# Patient Record
Sex: FEMALE | Race: BLACK OR AFRICAN AMERICAN | NOT HISPANIC OR LATINO | Employment: OTHER | URBAN - METROPOLITAN AREA
[De-identification: names, ages, dates, MRNs, and addresses within clinical notes are randomized per-mention and may not be internally consistent; named-entity substitution may affect disease eponyms.]

---

## 2017-02-06 ENCOUNTER — COMMUNICATION - HEALTHEAST (OUTPATIENT)
Dept: INTERNAL MEDICINE | Facility: CLINIC | Age: 82
End: 2017-02-06

## 2017-02-06 DIAGNOSIS — G89.4 CHRONIC PAIN SYNDROME: ICD-10-CM

## 2017-02-06 DIAGNOSIS — R51.9 HEADACHE: ICD-10-CM

## 2017-02-06 DIAGNOSIS — R10.9 ABDOMINAL PAIN, UNSPECIFIED SITE: ICD-10-CM

## 2017-02-15 ENCOUNTER — HOSPITAL ENCOUNTER (INPATIENT)
Facility: CLINIC | Age: 82
LOS: 3 days | Discharge: HOME | DRG: 195 | End: 2017-02-18
Attending: FAMILY MEDICINE | Admitting: INTERNAL MEDICINE
Payer: COMMERCIAL

## 2017-02-15 ENCOUNTER — RECORDS - HEALTHEAST (OUTPATIENT)
Dept: ADMINISTRATIVE | Facility: OTHER | Age: 82
End: 2017-02-15

## 2017-02-15 ENCOUNTER — APPOINTMENT (OUTPATIENT)
Dept: GENERAL RADIOLOGY | Facility: CLINIC | Age: 82
DRG: 195 | End: 2017-02-15
Attending: FAMILY MEDICINE
Payer: COMMERCIAL

## 2017-02-15 DIAGNOSIS — R11.2 NON-INTRACTABLE VOMITING WITH NAUSEA, UNSPECIFIED VOMITING TYPE: ICD-10-CM

## 2017-02-15 DIAGNOSIS — K59.01 SLOW TRANSIT CONSTIPATION: Primary | ICD-10-CM

## 2017-02-15 DIAGNOSIS — E87.6 HYPOKALEMIA: ICD-10-CM

## 2017-02-15 DIAGNOSIS — J10.1 INFLUENZA B: ICD-10-CM

## 2017-02-15 LAB
ALBUMIN SERPL-MCNC: 3.4 G/DL (ref 3.4–5)
ALBUMIN UR-MCNC: NEGATIVE MG/DL
ALP SERPL-CCNC: 63 U/L (ref 40–150)
ALT SERPL W P-5'-P-CCNC: 17 U/L (ref 0–50)
ANION GAP SERPL CALCULATED.3IONS-SCNC: 7 MMOL/L (ref 3–14)
APPEARANCE UR: CLEAR
AST SERPL W P-5'-P-CCNC: 25 U/L (ref 0–45)
BASOPHILS # BLD AUTO: 0 10E9/L (ref 0–0.2)
BASOPHILS NFR BLD AUTO: 0.4 %
BILIRUB SERPL-MCNC: 0.4 MG/DL (ref 0.2–1.3)
BILIRUB UR QL STRIP: NEGATIVE
BUN SERPL-MCNC: 9 MG/DL (ref 7–30)
CALCIUM SERPL-MCNC: 8.7 MG/DL (ref 8.5–10.1)
CHLORIDE SERPL-SCNC: 95 MMOL/L (ref 94–109)
CO2 SERPL-SCNC: 32 MMOL/L (ref 20–32)
COLOR UR AUTO: NORMAL
CREAT SERPL-MCNC: 0.58 MG/DL (ref 0.52–1.04)
DIFFERENTIAL METHOD BLD: ABNORMAL
EOSINOPHIL # BLD AUTO: 0 10E9/L (ref 0–0.7)
EOSINOPHIL NFR BLD AUTO: 0 %
ERYTHROCYTE [DISTWIDTH] IN BLOOD BY AUTOMATED COUNT: 12.8 % (ref 10–15)
FLUAV+FLUBV AG SPEC QL: ABNORMAL
FLUAV+FLUBV AG SPEC QL: NEGATIVE
GFR SERPL CREATININE-BSD FRML MDRD: ABNORMAL ML/MIN/1.7M2
GLUCOSE SERPL-MCNC: 111 MG/DL (ref 70–99)
GLUCOSE UR STRIP-MCNC: NEGATIVE MG/DL
HCT VFR BLD AUTO: 39 % (ref 35–47)
HGB BLD-MCNC: 13.3 G/DL (ref 11.7–15.7)
HGB UR QL STRIP: NEGATIVE
IMM GRANULOCYTES # BLD: 0 10E9/L (ref 0–0.4)
IMM GRANULOCYTES NFR BLD: 0 %
KETONES UR STRIP-MCNC: NEGATIVE MG/DL
LACTATE BLD-SCNC: 1.7 MMOL/L (ref 0.7–2.1)
LEUKOCYTE ESTERASE UR QL STRIP: NEGATIVE
LIPASE SERPL-CCNC: 334 U/L (ref 73–393)
LYMPHOCYTES # BLD AUTO: 0.9 10E9/L (ref 0.8–5.3)
LYMPHOCYTES NFR BLD AUTO: 37.6 %
MCH RBC QN AUTO: 30 PG (ref 26.5–33)
MCHC RBC AUTO-ENTMCNC: 34.1 G/DL (ref 31.5–36.5)
MCV RBC AUTO: 88 FL (ref 78–100)
MONOCYTES # BLD AUTO: 0.5 10E9/L (ref 0–1.3)
MONOCYTES NFR BLD AUTO: 20.1 %
NEUTROPHILS # BLD AUTO: 1 10E9/L (ref 1.6–8.3)
NEUTROPHILS NFR BLD AUTO: 41.9 %
NITRATE UR QL: NEGATIVE
NRBC # BLD AUTO: 0 10*3/UL
NRBC BLD AUTO-RTO: 0 /100
PH UR STRIP: 6.5 PH (ref 5–7)
PLATELET # BLD AUTO: 127 10E9/L (ref 150–450)
PLATELET # BLD EST: ABNORMAL 10*3/UL
POTASSIUM SERPL-SCNC: 3 MMOL/L (ref 3.4–5.3)
PROT SERPL-MCNC: 8.1 G/DL (ref 6.8–8.8)
RBC # BLD AUTO: 4.44 10E12/L (ref 3.8–5.2)
RBC MORPH BLD: NORMAL
SODIUM SERPL-SCNC: 134 MMOL/L (ref 133–144)
SP GR UR STRIP: 1 (ref 1–1.03)
SPECIMEN SOURCE: ABNORMAL
URN SPEC COLLECT METH UR: NORMAL
UROBILINOGEN UR STRIP-MCNC: NORMAL MG/DL (ref 0–2)
WBC # BLD AUTO: 2.3 10E9/L (ref 4–11)

## 2017-02-15 PROCEDURE — 99285 EMERGENCY DEPT VISIT HI MDM: CPT | Mod: 25

## 2017-02-15 PROCEDURE — 85025 COMPLETE CBC W/AUTO DIFF WBC: CPT | Performed by: FAMILY MEDICINE

## 2017-02-15 PROCEDURE — 96365 THER/PROPH/DIAG IV INF INIT: CPT

## 2017-02-15 PROCEDURE — 99285 EMERGENCY DEPT VISIT HI MDM: CPT | Mod: Z6 | Performed by: FAMILY MEDICINE

## 2017-02-15 PROCEDURE — 83605 ASSAY OF LACTIC ACID: CPT | Performed by: FAMILY MEDICINE

## 2017-02-15 PROCEDURE — 80053 COMPREHEN METABOLIC PANEL: CPT | Performed by: FAMILY MEDICINE

## 2017-02-15 PROCEDURE — 12000001 ZZH R&B MED SURG/OB UMMC

## 2017-02-15 PROCEDURE — 25800025 ZZH RX 258: Performed by: INTERNAL MEDICINE

## 2017-02-15 PROCEDURE — 25000128 H RX IP 250 OP 636: Performed by: FAMILY MEDICINE

## 2017-02-15 PROCEDURE — 83690 ASSAY OF LIPASE: CPT | Performed by: FAMILY MEDICINE

## 2017-02-15 PROCEDURE — 99222 1ST HOSP IP/OBS MODERATE 55: CPT | Performed by: INTERNAL MEDICINE

## 2017-02-15 PROCEDURE — 25000128 H RX IP 250 OP 636: Performed by: INTERNAL MEDICINE

## 2017-02-15 PROCEDURE — 87804 INFLUENZA ASSAY W/OPTIC: CPT | Performed by: FAMILY MEDICINE

## 2017-02-15 PROCEDURE — 25000132 ZZH RX MED GY IP 250 OP 250 PS 637: Performed by: INTERNAL MEDICINE

## 2017-02-15 PROCEDURE — 71020 XR CHEST 2 VW: CPT

## 2017-02-15 PROCEDURE — 81003 URINALYSIS AUTO W/O SCOPE: CPT | Performed by: FAMILY MEDICINE

## 2017-02-15 PROCEDURE — 25000125 ZZHC RX 250: Performed by: FAMILY MEDICINE

## 2017-02-15 RX ORDER — POTASSIUM CHLORIDE 1500 MG/1
20-40 TABLET, EXTENDED RELEASE ORAL
Status: DISCONTINUED | OUTPATIENT
Start: 2017-02-15 | End: 2017-02-18 | Stop reason: HOSPADM

## 2017-02-15 RX ORDER — POTASSIUM CHLORIDE 1.5 G/1.58G
20-40 POWDER, FOR SOLUTION ORAL
Status: DISCONTINUED | OUTPATIENT
Start: 2017-02-15 | End: 2017-02-18 | Stop reason: HOSPADM

## 2017-02-15 RX ORDER — AMLODIPINE BESYLATE 5 MG/1
5 TABLET ORAL DAILY
Status: DISCONTINUED | OUTPATIENT
Start: 2017-02-15 | End: 2017-02-18 | Stop reason: HOSPADM

## 2017-02-15 RX ORDER — POTASSIUM CHLORIDE 7.45 MG/ML
10 INJECTION INTRAVENOUS
Status: DISCONTINUED | OUTPATIENT
Start: 2017-02-15 | End: 2017-02-18 | Stop reason: HOSPADM

## 2017-02-15 RX ORDER — ALUMINA, MAGNESIA, AND SIMETHICONE 2400; 2400; 240 MG/30ML; MG/30ML; MG/30ML
15 SUSPENSION ORAL EVERY 4 HOURS PRN
Status: DISCONTINUED | OUTPATIENT
Start: 2017-02-15 | End: 2017-02-18 | Stop reason: HOSPADM

## 2017-02-15 RX ORDER — ACETAMINOPHEN 325 MG/1
650 TABLET ORAL EVERY 4 HOURS PRN
Status: DISCONTINUED | OUTPATIENT
Start: 2017-02-15 | End: 2017-02-18 | Stop reason: HOSPADM

## 2017-02-15 RX ORDER — POTASSIUM CHLORIDE 29.8 MG/ML
20 INJECTION INTRAVENOUS
Status: DISCONTINUED | OUTPATIENT
Start: 2017-02-15 | End: 2017-02-18 | Stop reason: HOSPADM

## 2017-02-15 RX ORDER — SODIUM CHLORIDE 9 MG/ML
INJECTION, SOLUTION INTRAVENOUS CONTINUOUS
Status: DISCONTINUED | OUTPATIENT
Start: 2017-02-15 | End: 2017-02-17

## 2017-02-15 RX ORDER — LANOLIN ALCOHOL/MO/W.PET/CERES
3 CREAM (GRAM) TOPICAL
Status: DISCONTINUED | OUTPATIENT
Start: 2017-02-15 | End: 2017-02-18 | Stop reason: HOSPADM

## 2017-02-15 RX ORDER — SENNOSIDES 8.6 MG
8.6 TABLET ORAL 2 TIMES DAILY PRN
Status: DISCONTINUED | OUTPATIENT
Start: 2017-02-15 | End: 2017-02-17

## 2017-02-15 RX ORDER — SODIUM CHLORIDE 9 MG/ML
INJECTION, SOLUTION INTRAVENOUS CONTINUOUS
Status: DISCONTINUED | OUTPATIENT
Start: 2017-02-15 | End: 2017-02-15

## 2017-02-15 RX ORDER — DEXTROSE MONOHYDRATE, SODIUM CHLORIDE, AND POTASSIUM CHLORIDE 50; .745; 4.5 G/1000ML; G/1000ML; G/1000ML
INJECTION, SOLUTION INTRAVENOUS CONTINUOUS
Status: DISCONTINUED | OUTPATIENT
Start: 2017-02-15 | End: 2017-02-15

## 2017-02-15 RX ORDER — ALBUTEROL SULFATE 0.83 MG/ML
2.5 SOLUTION RESPIRATORY (INHALATION)
Status: DISCONTINUED | OUTPATIENT
Start: 2017-02-15 | End: 2017-02-18 | Stop reason: HOSPADM

## 2017-02-15 RX ADMIN — MELATONIN TAB 3 MG 3 MG: 3 TAB at 21:52

## 2017-02-15 RX ADMIN — POTASSIUM CHLORIDE 10 MEQ: 14.9 INJECTION, SOLUTION, CONCENTRATE PARENTERAL at 11:30

## 2017-02-15 RX ADMIN — POTASSIUM CHLORIDE, DEXTROSE MONOHYDRATE AND SODIUM CHLORIDE: 75; 5; 450 INJECTION, SOLUTION INTRAVENOUS at 14:57

## 2017-02-15 RX ADMIN — SODIUM CHLORIDE: 9 INJECTION, SOLUTION INTRAVENOUS at 16:23

## 2017-02-15 RX ADMIN — SODIUM CHLORIDE 1000 ML: 9 INJECTION, SOLUTION INTRAVENOUS at 08:44

## 2017-02-15 RX ADMIN — AMLODIPINE BESYLATE 5 MG: 5 TABLET ORAL at 16:31

## 2017-02-15 RX ADMIN — PSYLLIUM HUSK 0.52 G: 20 CAPSULE ORAL at 16:31

## 2017-02-15 RX ADMIN — ENOXAPARIN SODIUM 30 MG: 30 INJECTION SUBCUTANEOUS at 16:32

## 2017-02-15 RX ADMIN — ACETAMINOPHEN 650 MG: 325 TABLET, FILM COATED ORAL at 16:44

## 2017-02-15 NOTE — ED PROVIDER NOTES
History     Chief Complaint   Patient presents with     Cough     cough for the past 3 days, yellow sputum     Vomiting     vomited 3x , started vomiting since being started on Tamaflu on Saturday     Abdominal Pain     lower left abd pain for 3 days     HPI  Radha Mcmanus is a 86 year old female who presents with cough, productive of yellow sputum,fever, chills and myalgias, nausea and vomiting.  Symptoms started with cough and fever 4 days ago. She was seen in urgent care was diagnosed with influenza and started on Tamiflu. Family and patient did not believe any testing was done for flu. A chest x-ray showed only chronic changes that day.  Her symptoms persist, and now she also reports lower abdominal pain, constipation, red blood in stool when she wipes pain with cough.  Patient reports she vomited 3 or 4 times last night. She is not nauseated now.  No urinary symptoms, no radiation of abdominal pain.    I have reviewed the Medications, Allergies, Past Medical and Surgical History, and Social History in the Epic system.    Review of Systems  All other systems reviewed and were negative    Physical Exam   BP: (!) 161/94  Pulse: 88  Temp: 98.1  F (36.7  C)  Resp: 18  SpO2: 98 %  Physical Exam   Constitutional: She is oriented to person, place, and time. She appears well-developed and well-nourished.   HENT:   Head: Normocephalic and atraumatic.   Mouth/Throat: Uvula is midline. Posterior oropharyngeal erythema present. No oropharyngeal exudate or posterior oropharyngeal edema.   Eyes: EOM are normal. Pupils are equal, round, and reactive to light.   Neck: Normal range of motion and phonation normal. Neck supple. No tracheal deviation present. No thyromegaly present.   Cardiovascular: Normal rate, regular rhythm, normal heart sounds and intact distal pulses.  Exam reveals no gallop and no friction rub.    No murmur heard.  Pulmonary/Chest: Effort normal. No stridor. No respiratory distress. She has rales  (coarse, bibasilar). She exhibits no tenderness.   Abdominal: Soft. Bowel sounds are normal. She exhibits no distension and no mass. There is tenderness in the left lower quadrant. There is no rigidity, no rebound, no guarding and no CVA tenderness.   Musculoskeletal: She exhibits no edema or tenderness.   Neurological: She is alert and oriented to person, place, and time. No cranial nerve deficit. Coordination normal.   Skin: Skin is warm and dry. No rash noted.   Psychiatric: She has a normal mood and affect. Her behavior is normal.   Nursing note and vitals reviewed.      ED Course     ED Course     Procedures             Critical Care time:  none               Labs Ordered and Resulted from Time of ED Arrival Up to the Time of Departure from the ED   CBC WITH PLATELETS DIFFERENTIAL - Abnormal; Notable for the following:        Result Value    WBC 2.3 (*)     Platelet Count 127 (*)     Absolute Neutrophil 1.0 (*)     All other components within normal limits   COMPREHENSIVE METABOLIC PANEL - Abnormal; Notable for the following:     Potassium 3.0 (*)     Glucose 111 (*)     All other components within normal limits   INFLUENZA A/B ANTIGEN - Abnormal; Notable for the following:     Influenza B   (*)     Value: Positive   Test results must be correlated with clinical data. If necessary, results   should be confirmed by a molecular assay or viral culture.      All other components within normal limits   LIPASE   LACTIC ACID WHOLE BLOOD   UA MACROSCOPIC WITH REFLEX TO MICRO AND CULTURE   PULSE OXIMETRY NURSING       Assessments & Plan (with Medical Decision Making)   Elderly patient presenting with 3-4 days of febrile respiratory illness, started empirically on Tamiflu, developed nausea vomiting and generalized weakness.  Initial differential diagnosis includes influenza, pneumonia, other viral upper respiratory infection with gastroenteritis, small bowel obstruction, diverticulitis, adverse reaction to Tamiflu.  On  exam she has stable vitals and is in no respiratory distress.  She does appear slightly dehydrated and exhibits signs of generalized weakness.  She has coarse rales at her lung bases, however her chest x-ray does not show any acute infiltrates.  White blood cell count is low with absolute neutrophil count of 1000, certainly consistent with viral syndrome.  Influenza B swab positive.  Abdominal exam is benign, making diverticulitis or other acute abdominal process much less likely.  We have started replacement with IV fluids, IV potassium.  Given the patient's advanced age, severity of her symptoms, and difficulty with tolerating Tamiflu, I would like to admit for hydration, potassium replacement,, when necessary antiemetics and Tamiflu.  I will discuss with the hospitalist.    I have reviewed the nursing notes.    I have reviewed the findings, diagnosis, plan and need for follow up with the patient.    New Prescriptions    No medications on file       Final diagnoses:   Influenza B   Non-intractable vomiting with nausea, unspecified vomiting type   Hypokalemia       2/15/2017   [facility], [place], EMERGENCY DEPARTMENT     Catalino Morocho MD  02/15/17 2668

## 2017-02-15 NOTE — PLAN OF CARE
Problem: Goal Outcome Summary  Goal: Goal Outcome Summary  Outcome: No Change  Pt arrived on unit at 1345. Sammarinese speaking,  ordered 1600 for full assessment LS diminished. Voiding. IV fluids running 125 mL/hr. Tolerating reg diet. Continue POC.

## 2017-02-15 NOTE — H&P
DATE OF ADMISSION AND EXAMINATION:  02/15/2017      HISTORY OF PRESENT ILLNESS:  Radha Mcmanus is an 86-year-old generally healthy Bangladeshi female who is being admitted through the ER for the evaluation of cough, nausea, vomiting and weakness in the setting of acute influenza B infection.  The patient presented to the ER with a 3-4 day history of fevers, minimally productive cough, myalgias.  She was seen in urgent care center approximately 1 day after the onset of her symptoms, 4 days ago was started empirically on Tamiflu.  The patient's family states that subsequently she developed nausea with frequent episodes of emesis as well as left lower abdominal pain.  She has had very little intake over the last 1-2 days and reports having several episodes of emesis the night prior to admission.  She is not aware of any fever.  She denies any  symptoms.  She has been constipated and notes that she had some red blood and a small hard stool last evening.  She denies chest pain.  She denies a history of chronic lung disease.  Family believes she had an episode of pneumonia many years ago.  No history of asthma, no history of cigarette use.  Chest x-ray performed in the ER revealed no acute process.      Laboratory studies in the ER were remarkable for a sodium of 134, potassium 3.0, BUN and creatinine were normal.  Lactic acid level is 1.7.  CBC was remarkable for white count 2300, platelet count is 127,000, hemoglobin was 13.3.      PAST MEDICAL HISTORY:  Generally unremarkable for chronic medical concerns.  She has a history of hypertension treated with amlodipine.  Has a history of L3 vertebral fracture noted in 2015.  The family is not aware of a history of cardiac or pulmonary disease.      MEDICATIONS PRIOR TO ADMISSION:     1.  Amlodipine 5 mg daily.   2.  Omeprazole 40 mg daily.   3.  Tylenol on a p.r.n. basis.      MEDICATION ALLERGIES:  Contrast dye.  It is not clear what reaction she gets from this.  There is also an  allergy to erythromycin and it is not clear what reaction she gets from this either.      SOCIAL HISTORY:  The patient lives with a relative.  She is quite active at baseline and is independent with her cares.  There is no history of cigarette or alcohol abuse.      FAMILY HISTORY:  Reviewed and noncontributory.      PHYSICAL EXAMINATION:   GENERAL:  She is a well-appearing female who is lying on a stretcher in the emergency room.  She does not appear acutely ill.  She does have an occasional dry cough.   VITAL SIGNS:  Afebrile, blood pressure is 143/77.  Heart rate is in the 70s.  O2 saturation in the upper 90s on room air.     HEENT:  Teeth are in fair repair.  Oral mucosa is moist.   NECK:  Supple.   LUNGS:  Clear.   CARDIAC:  Reveals faint crackles at both bases.     CARDIAC:  Reveals a regular rate and rhythm without murmurs.   BREASTS:  Not examined.   ABDOMEN:  Soft, nondistended.  There is mild left lower abdominal tenderness which appears to be accentuated when she coughs or strains.  There are no obvious masses.   RECTAL:  Not performed.   EXTREMITIES:  Reveals no clubbing, cyanosis or edema.      LABORATORY STUDIES:  As outlined above.      ASSESSMENT:   1.  Acute febrile illness manifested by cough, sore throat, malaise with subsequent nausea and vomiting which may be related to the institution of Tamiflu a few days ago.  Laboratory studies are remarkable for mild hyponatremia, hypokalemia and also for mild leukopenia, the etiology or chronicity of which is not clear.  Conceivably, leukopenia is related to a viral illness as well.  The patient states that she does feel improved already since receiving IV fluids in the ER.  She will need to be admitted though for further monitoring of her gastrointestinal and respiratory status.   2.  Hypokalemia secondary to frequent nausea, vomiting.   3.  Leukopenia as above possibly related to viral illness versus chronic process.  There is no history to suggest  underlying malignancy.   4.  Hypertension, under fair control on amlodipine.      PLAN:  The patient is being admitted to the medical unit on droplet precautions.  She will be continued on IV fluids.  She will be given potassium replacement.  Albuterol nebulizer treatments will be offered to her for any paroxysms of coughing.  Otherwise, she will receive supportive treatment.  Electrolytes will be repeated in the morning.  Her GI status will be monitored.  I suspect her GI symptoms will improve with hydration and with stopping Tamiflu.  Should she experience continued lower abdominal pain, then further evaluation would be indicated.  The patient does describe constipation which could conceivably be the cause for her abdominal pain as well as her report of bright red blood and hard stool last evening.  I would add the patient's son does not believe she has ever had a colonoscopy before.  Again, this can be reassessed in the morning.        The patient will be encouraged to ambulate with staff present.  She will be started on Lovenox at least for today until it is clear that she is more active.  I would anticipate she will be able to be discharged within 1-3 days as respiratory and GI status dictate.         ALEJANDRINA AGUERO MD             D: 02/15/2017 15:35   T: 02/15/2017 17:23   MT:       Name:     LASHAY FABIAN   MRN:      [id number]        Account:      [id number]   :      1931           Admitted:     [id number]      Document: [id number]

## 2017-02-15 NOTE — LETTER
Transition Communication Hand-off for Care Transitions to Next Level of Care Provider    Name: Radha Mcmanus  MRN #: [id number]  Primary Care Provider: Lawrence Mckeon     Primary Clinic: [address] [address]     Reason for Hospitalization:  Hypokalemia [E87.6]  Influenza B [J10.1]  Non-intractable vomiting with nausea, unspecified vomiting type [R11.2]  Admit Date/Time: 2/15/2017  8:02 AM  Expected Discharge Date: 2/18/17  Payor Source: Payor: UCARE / Plan: UCARE FOR [employer] / Product Type: HMO /            Discharge Plan: home with family support        AVS/Discharge Summary included

## 2017-02-15 NOTE — IP AVS SNAPSHOT
[facility]    [address]    [place] [address]    Phone:  [phone]                                       After Visit Summary   2/15/2017    Radha Mcmanus    MRN: [id number]           After Visit Summary Signature Page     I have received my discharge instructions, and my questions have been answered. I have discussed any challenges I see with this plan with the nurse or doctor.    ..........................................................................................................................................  Patient/Patient Representative Signature      ..........................................................................................................................................  Patient Representative Print Name and Relationship to Patient    ..................................................               ................................................  Date                                            Time    ..........................................................................................................................................  Reviewed by Signature/Title    ...................................................              ..............................................  Date                                                            Time

## 2017-02-15 NOTE — IP AVS SNAPSHOT
MRN:[id number]                      After Visit Summary   2/15/2017    Radha Mcmanus    MRN: [id number]           Thank you!     Thank you for choosing [facility] for your care. Our goal is always to provide you with excellent care. Hearing back from our patients is one way we can continue to improve our services. Please take a few minutes to complete the written survey that you may receive in the mail after you visit with us. Thank you!        Patient Information     Date Of Birth          1/1/1931        About your hospital stay     You were admitted on:  February 15, 2017 You last received care in the:  [facility]    You were discharged on:  February 18, 2017       Who to Call     For medical emergencies, please call 911.  For non-urgent questions about your medical care, please call your primary care provider or clinic, [phone]          Attending Provider     Provider Specialty    Catalino Morocho MD [facility], Abdulaziz Urban MD Internal Medicine       Primary Care Provider Office Phone # Fax #    Lawrence Mckeon [phone]       [address] [address]        Your next 10 appointments already scheduled     Feb 18, 2017  5:00 PM [facility] Inpatient with Matias Samano    Services Department ([facility])    [address] [place] [id number]               Feb 19, 2017  8:30 AM [facility] Inpatient with Lg Ferris    Services Department ([facility])    [address] [place] [id number]               Feb 19, 2017  5:00 PM [facility] Inpatient with Wilmer Faye    Services Department ([facility])    [address] [place] [phone]               Feb 20, 2017  8:30 AM [facility] Inpatient  "with Zaira Rubio    Services Department ([facility])    [address] [facility] [address]               Feb 21, 2017  8:30 AM [facility] Inpatient with Samiradmitri Sanders    Services Department ([facility])    [address] [facility] [address] [address]                 Further instructions from your care team       See primary doctor in 1-2 weeks to have repeat blood work performed (potassium level) and to have abdominal pain and rectal bleeding evaluated.   Call primary doctor or come to emergency room if you have worsening breathing, coughing, fever, worsening abdominal pain.    Pending Results     No orders found from 2/13/2017 to 2/16/2017.            Statement of Approval     Ordered          02/18/17 0840  I have reviewed and agree with all the recommendations and orders detailed in this document.  EFFECTIVE NOW     Approved and electronically signed by:  Abdulaziz Dillard MD           02/18/17 0854  I have reviewed and agree with all the recommendations and orders detailed in this document.     Approved and electronically signed by:  Abdulaziz Dillard MD             Admission Information     Date & Time Provider Department Dept. Phone    2/15/2017 Abdulaziz Dillard MD [facility] [phone]      Your Vitals Were     Blood Pressure Pulse Temperature Respirations Pulse Oximetry       130/70 (BP Location: Right arm) 76 98.6  F (37  C) (Oral) 18 98%       MyChart Information     [employer] lets you send messages to your doctor, view your test results, renew your prescriptions, schedule appointments and more. To sign up, go to [email or web address] . Click on \"Log in\" on the left side of the screen, which will take you to the Welcome page. Then click on \"Sign up Now\" on the right side of the page.     You will be asked to enter the access code listed below, as well as " some personal information. Please follow the directions to create your username and password.     Your access code is: [email or web address]  Expires: 2017  4:09 PM     Your access code will  in 90 days. If you need help or a new code, please call your [facility] clinic or [phone].        Care EveryWhere ID     This is your Care EveryWhere ID. This could be used by other organizations to access your [facility] medical records  [id number]           Review of your medicines      START taking        Dose / Directions    polyethylene glycol Packet   Commonly known as:  MIRALAX/GLYCOLAX   Used for:  Slow transit constipation        Dose:  17 g   Take 17 g by mouth daily   Quantity:  30 packet   Refills:  3       potassium chloride SA 20 MEQ CR tablet   Commonly known as:  K-DUR/KLOR-CON M   Used for:  Hypokalemia        Dose:  20 mEq   Take 1 tablet (20 mEq) by mouth daily for 5 days   Quantity:  5 tablet   Refills:  0       psyllium 0.52 G capsule   Used for:  Slow transit constipation        Dose:  0.52 g   Take 1 capsule (0.52 g) by mouth daily   Quantity:  540 capsule   Refills:  3         CONTINUE these medicines which have NOT CHANGED        Dose / Directions    acetaminophen 500 MG tablet   Commonly known as:  TYLENOL        Dose:  1-2 tablet   Take 1-2 tablets by mouth every 6 hours as needed for pain and fever.   Quantity:  30 tablet   Refills:  0       NORVASC 5 MG tablet   Generic drug:  amLODIPine        Dose:  5 mg   Take 5 mg by mouth daily.   Refills:  0       OMEPRAZOLE PO        Dose:  40 mg   Take 40 mg by mouth every morning   Refills:  0       order for DME   Used for:  L3 vertebral fracture, with routine healing, subsequent encounter        Equipment being ordered: Long handled shoe horn and reacher grabber   Quantity:  1 each   Refills:  0            Where to get your medicines      These medications were sent to [facility] [place], [place] - [address]  [address] [place]  [facility] [address]     Phone:  [phone]     polyethylene glycol Packet    potassium chloride SA 20 MEQ CR tablet    psyllium 0.52 G capsule                Protect others around you: Learn how to safely use, store and throw away your medicines at www.disposemymeds.org.             Medication List: This is a list of all your medications and when to take them. Check marks below indicate your daily home schedule. Keep this list as a reference.      Medications           Morning Afternoon Evening Bedtime As Needed    acetaminophen 500 MG tablet   Commonly known as:  TYLENOL   Take 1-2 tablets by mouth every 6 hours as needed for pain and fever.   Last time this was given:  650 mg on 2/17/2017  2:47 AM                                   NORVASC 5 MG tablet   Take 5 mg by mouth daily.   Last time this was given:  5 mg on 2/18/2017  8:35 AM   Generic drug:  amLODIPine                                   OMEPRAZOLE PO   Take 40 mg by mouth every morning   Last time this was given:  40 mg on 2/18/2017  8:35 AM                                   order for DME   Equipment being ordered: Long handled shoe horn and reacher grabber                                polyethylene glycol Packet   Commonly known as:  MIRALAX/GLYCOLAX   Take 17 g by mouth daily   Last time this was given:  17 g on 2/18/2017  8:35 AM                                   potassium chloride SA 20 MEQ CR tablet   Commonly known as:  K-DUR/KLOR-CON M   Take 1 tablet (20 mEq) by mouth daily for 5 days   Last time this was given:  20 mEq on 2/18/2017  9:58 AM                                   psyllium 0.52 G capsule   Take 1 capsule (0.52 g) by mouth daily   Last time this was given:  0.52 g on 2/18/2017  8:35 AM

## 2017-02-16 ENCOUNTER — OFFICE VISIT (OUTPATIENT)
Dept: INTERPRETER SERVICES | Facility: CLINIC | Age: 82
End: 2017-02-16

## 2017-02-16 LAB
ALBUMIN SERPL-MCNC: 3.1 G/DL (ref 3.4–5)
ALP SERPL-CCNC: 51 U/L (ref 40–150)
ALT SERPL W P-5'-P-CCNC: 17 U/L (ref 0–50)
ANION GAP SERPL CALCULATED.3IONS-SCNC: 6 MMOL/L (ref 3–14)
AST SERPL W P-5'-P-CCNC: 24 U/L (ref 0–45)
BILIRUB SERPL-MCNC: 0.4 MG/DL (ref 0.2–1.3)
BUN SERPL-MCNC: 6 MG/DL (ref 7–30)
CALCIUM SERPL-MCNC: 8.1 MG/DL (ref 8.5–10.1)
CHLORIDE SERPL-SCNC: 103 MMOL/L (ref 94–109)
CO2 SERPL-SCNC: 31 MMOL/L (ref 20–32)
CREAT SERPL-MCNC: 0.59 MG/DL (ref 0.52–1.04)
ERYTHROCYTE [DISTWIDTH] IN BLOOD BY AUTOMATED COUNT: 12.9 % (ref 10–15)
GFR SERPL CREATININE-BSD FRML MDRD: ABNORMAL ML/MIN/1.7M2
GLUCOSE SERPL-MCNC: 90 MG/DL (ref 70–99)
HCT VFR BLD AUTO: 36.6 % (ref 35–47)
HGB BLD-MCNC: 12.4 G/DL (ref 11.7–15.7)
MCH RBC QN AUTO: 30.1 PG (ref 26.5–33)
MCHC RBC AUTO-ENTMCNC: 33.9 G/DL (ref 31.5–36.5)
MCV RBC AUTO: 89 FL (ref 78–100)
PLATELET # BLD AUTO: 125 10E9/L (ref 150–450)
POTASSIUM SERPL-SCNC: 3.1 MMOL/L (ref 3.4–5.3)
POTASSIUM SERPL-SCNC: 3.8 MMOL/L (ref 3.4–5.3)
PROT SERPL-MCNC: 7.1 G/DL (ref 6.8–8.8)
RBC # BLD AUTO: 4.12 10E12/L (ref 3.8–5.2)
SODIUM SERPL-SCNC: 140 MMOL/L (ref 133–144)
WBC # BLD AUTO: 2.4 10E9/L (ref 4–11)

## 2017-02-16 PROCEDURE — 99232 SBSQ HOSP IP/OBS MODERATE 35: CPT | Performed by: INTERNAL MEDICINE

## 2017-02-16 PROCEDURE — 36415 COLL VENOUS BLD VENIPUNCTURE: CPT | Performed by: INTERNAL MEDICINE

## 2017-02-16 PROCEDURE — 25000132 ZZH RX MED GY IP 250 OP 250 PS 637: Performed by: INTERNAL MEDICINE

## 2017-02-16 PROCEDURE — 25000128 H RX IP 250 OP 636: Performed by: INTERNAL MEDICINE

## 2017-02-16 PROCEDURE — 84132 ASSAY OF SERUM POTASSIUM: CPT | Performed by: INTERNAL MEDICINE

## 2017-02-16 PROCEDURE — 85027 COMPLETE CBC AUTOMATED: CPT | Performed by: INTERNAL MEDICINE

## 2017-02-16 PROCEDURE — 12000001 ZZH R&B MED SURG/OB UMMC

## 2017-02-16 PROCEDURE — T1013 SIGN LANG/ORAL INTERPRETER: HCPCS | Mod: U3

## 2017-02-16 PROCEDURE — 80053 COMPREHEN METABOLIC PANEL: CPT | Performed by: INTERNAL MEDICINE

## 2017-02-16 RX ORDER — POLYETHYLENE GLYCOL 3350 17 G/17G
17 POWDER, FOR SOLUTION ORAL DAILY
Status: DISCONTINUED | OUTPATIENT
Start: 2017-02-16 | End: 2017-02-18 | Stop reason: HOSPADM

## 2017-02-16 RX ADMIN — AMLODIPINE BESYLATE 5 MG: 5 TABLET ORAL at 09:36

## 2017-02-16 RX ADMIN — POTASSIUM CHLORIDE 40 MEQ: 1500 TABLET, EXTENDED RELEASE ORAL at 09:37

## 2017-02-16 RX ADMIN — PSYLLIUM HUSK 0.52 G: 20 CAPSULE ORAL at 09:36

## 2017-02-16 RX ADMIN — OMEPRAZOLE 40 MG: 20 CAPSULE, DELAYED RELEASE ORAL at 09:36

## 2017-02-16 RX ADMIN — POTASSIUM CHLORIDE 20 MEQ: 1500 TABLET, EXTENDED RELEASE ORAL at 16:22

## 2017-02-16 RX ADMIN — POLYETHYLENE GLYCOL 3350 17 G: 17 POWDER, FOR SOLUTION ORAL at 11:36

## 2017-02-16 RX ADMIN — ACETAMINOPHEN 650 MG: 325 TABLET, FILM COATED ORAL at 02:32

## 2017-02-16 RX ADMIN — ACETAMINOPHEN 650 MG: 325 TABLET, FILM COATED ORAL at 09:36

## 2017-02-16 RX ADMIN — POTASSIUM CHLORIDE 20 MEQ: 1500 TABLET, EXTENDED RELEASE ORAL at 11:36

## 2017-02-16 RX ADMIN — SODIUM CHLORIDE: 9 INJECTION, SOLUTION INTRAVENOUS at 05:39

## 2017-02-16 NOTE — PROGRESS NOTES
Pt seen with     She feels a little better today  Still feels nauseated, has had virtually no po intake since admission  Continues to have a sl productive cough, notes mild chest congestion and SOB  Overall feels very fatigued  L lower abd pain is improved  No BM since admission    Afebrile  BP 100s-140s/  HR 80s   02 sats upper 90s RA    Alert, in  NAD  Oral mucosa moist  RR 14, unlabored  Lungs crackles R base, faint scattered rhonchi B  CV rrr  Abd  Soft, non-distended, mild L LQ tenderness. No rebound or guarding  No LE edema  Alert, fully oriented    K 3.1  WBC 2,400  Hgb 12.4      Assessment    Influenza B infection.  + malaise, chest congestion, cough, nausea with frequent emesis prior to admission. Pt feels slightly improved this am, still with chest congestion, mild SOB    Nausea with emesis prior to admission, secondary to influenza vs side effects of Tamiflu.  No emesis since admission but very poor oral intake.  Pt continues to require IV fluids    Leukopenia (), stable overnight, possibly secondary to viral illness    Hypokalemia secondary to frequent emesis    L LQ abd pain, likely muscular, ie secondary to coughing, appears improved since admission    Constipation in setting of acute illness, with Pt report of blood per rectum with hard stool.  Has never had a colonoscopy    HTN, stable on amlodipine    Plan  Continue IV fluids  K replacement  Alb nebs  Mobilize  Bowel regimen.  Monitor stools, at the least should have an outpt GI eval at some point  No Lovenox in view of reported rectal bleeding

## 2017-02-16 NOTE — PLAN OF CARE
Problem: Goal Outcome Summary  Goal: Goal Outcome Summary  Outcome: No Change  Alert and oriented. VSS on room air. C/o headache in morning, given prn tylenol. Denies N/V, poor appetite. Voiding without difficulty. Bowel sounds active, pt states she has chronic constipation, scheduled bowel medications given. Up in room with SBA. IV infusing. Potassium replaced per protocol, lab recheck scheduled for 1530 today. Able to make needs known.  services used. Continue with POC.

## 2017-02-16 NOTE — PLAN OF CARE
Problem: Goal Outcome Summary  Goal: Goal Outcome Summary  Outcome: Improving  Focus: Pain.  D: Patient A&O times 4; VSS; LS clear and diminished in bases; BS+; c/o generalized pain, especially to abdomen and chest; c/o generalized weakness.  I: Assessment done with Vietnamese ; Tylenol given for pain; Lovenox SQ given.  A: Up with assist and ambulated to BR and voiding good amounts; poor appetite but ate and tolerated food brought in by family; PIV patent, intact and IV fluids infusing; had a small loose BM; family at bedside.  P: Continue with patient care.    Patient requested sleep aid and verbalized she has not slept for 4 nights; Dr. Maddox notified and Melatonin ordered and administered to patient.

## 2017-02-16 NOTE — PLAN OF CARE
Problem: Goal Outcome Summary  Goal: Goal Outcome Summary  Outcome: Improving  A/O x 4. Son is sleeping at bedside. Reports a headache and tylenol given. VSS. IVF infusing. Reports difficulty sleeping even after melatonin. Call light in reach. Cont to assess.

## 2017-02-16 NOTE — PROVIDER NOTIFICATION
Celeste paged. Dr. Maddox on unit and notified of patient's request for sleep aid and verbalized she has not slept for 4 nights.

## 2017-02-17 ENCOUNTER — OFFICE VISIT (OUTPATIENT)
Dept: INTERPRETER SERVICES | Facility: CLINIC | Age: 82
End: 2017-02-17

## 2017-02-17 LAB
ERYTHROCYTE [DISTWIDTH] IN BLOOD BY AUTOMATED COUNT: 12.8 % (ref 10–15)
HCT VFR BLD AUTO: 37.3 % (ref 35–47)
HGB BLD-MCNC: 12.5 G/DL (ref 11.7–15.7)
MCH RBC QN AUTO: 29.8 PG (ref 26.5–33)
MCHC RBC AUTO-ENTMCNC: 33.5 G/DL (ref 31.5–36.5)
MCV RBC AUTO: 89 FL (ref 78–100)
PLATELET # BLD AUTO: 123 10E9/L (ref 150–450)
POTASSIUM SERPL-SCNC: 3.4 MMOL/L (ref 3.4–5.3)
RBC # BLD AUTO: 4.2 10E12/L (ref 3.8–5.2)
WBC # BLD AUTO: 3.2 10E9/L (ref 4–11)

## 2017-02-17 PROCEDURE — 99232 SBSQ HOSP IP/OBS MODERATE 35: CPT | Performed by: INTERNAL MEDICINE

## 2017-02-17 PROCEDURE — T1013 SIGN LANG/ORAL INTERPRETER: HCPCS | Mod: U3

## 2017-02-17 PROCEDURE — 25000125 ZZHC RX 250: Performed by: INTERNAL MEDICINE

## 2017-02-17 PROCEDURE — 84132 ASSAY OF SERUM POTASSIUM: CPT | Performed by: INTERNAL MEDICINE

## 2017-02-17 PROCEDURE — 85027 COMPLETE CBC AUTOMATED: CPT | Performed by: INTERNAL MEDICINE

## 2017-02-17 PROCEDURE — 25000132 ZZH RX MED GY IP 250 OP 250 PS 637: Performed by: INTERNAL MEDICINE

## 2017-02-17 PROCEDURE — 36415 COLL VENOUS BLD VENIPUNCTURE: CPT | Performed by: INTERNAL MEDICINE

## 2017-02-17 PROCEDURE — 12000001 ZZH R&B MED SURG/OB UMMC

## 2017-02-17 RX ORDER — BISACODYL 10 MG
10 SUPPOSITORY, RECTAL RECTAL DAILY PRN
Status: DISCONTINUED | OUTPATIENT
Start: 2017-02-17 | End: 2017-02-18 | Stop reason: HOSPADM

## 2017-02-17 RX ORDER — SENNOSIDES 8.6 MG
2 TABLET ORAL 2 TIMES DAILY PRN
Status: DISCONTINUED | OUTPATIENT
Start: 2017-02-17 | End: 2017-02-18 | Stop reason: HOSPADM

## 2017-02-17 RX ADMIN — OMEPRAZOLE 40 MG: 20 CAPSULE, DELAYED RELEASE ORAL at 07:59

## 2017-02-17 RX ADMIN — POTASSIUM CHLORIDE 10 MEQ: 14.9 INJECTION, SOLUTION, CONCENTRATE PARENTERAL at 08:00

## 2017-02-17 RX ADMIN — POTASSIUM CHLORIDE 10 MEQ: 14.9 INJECTION, SOLUTION, CONCENTRATE PARENTERAL at 09:39

## 2017-02-17 RX ADMIN — AMLODIPINE BESYLATE 5 MG: 5 TABLET ORAL at 07:59

## 2017-02-17 RX ADMIN — PSYLLIUM HUSK 0.52 G: 20 CAPSULE ORAL at 07:59

## 2017-02-17 RX ADMIN — ACETAMINOPHEN 650 MG: 325 TABLET, FILM COATED ORAL at 02:47

## 2017-02-17 RX ADMIN — POLYETHYLENE GLYCOL 3350 17 G: 17 POWDER, FOR SOLUTION ORAL at 07:59

## 2017-02-17 NOTE — PROGRESS NOTES
Pt seen with nursing staff and     Pt feels improved  Cough improved  Nausea resolved, is now tolerating po  Very small BM last pm, still feels constipated, c/o lower abd tenderness  Still feels weak, does not feel ready for d/c    VSS Afebrile  Alert, in NAD, appears stronger  Lungs clear  CV rrr   Abd soft, sl protuberant, mild mid and L LQ tenderness, no guarding or rebound  No LE edema  No focal weakness, no tremors    WBC 3,200      Assessment    Influenza B infection. + malaise, chest congestion, cough, nausea with frequent emesis prior to admission Pt feels much improved today.    Nausea with emesis prior to admission, secondary to influenza vs side effects of Tamiflu, improved    Lower abd pain, possibly secondary to constipation vs muscle strain from coughing. Abd exam remains benign. Pt with recent c/o blood per rectum with hard stool, has not had bleeding since admission. No post colon evaluation     Leukopenia (), improved  possibly secondary to viral illness     Hypokalemia secondary to frequent emesis       HTN, stable on amlodipine       Plan  D/ IV fluids  Bowel regimen, monitor GI status, if continued symptoms with improvement in constipation, will need GI evaluation  Mobilize  Possible d/c to home tomorrow

## 2017-02-17 NOTE — PLAN OF CARE
Problem: Goal Outcome Summary  Goal: Goal Outcome Summary  Outcome: No Change  Pt rested in bed. A/O x 4 reported generalized body aches for which tylenol was given. Lungs diminished. Up with SBA to the bathroom. VSS. Son is sleeping at bedside. Able to make needs known. Cont to assess.

## 2017-02-17 NOTE — PLAN OF CARE
Problem: Goal Outcome Summary  Goal: Goal Outcome Summary  Outcome: Improving  Pt states feeling better today but continues to c/o generalized weakness. Has infrequent cough. Bring up small amount of sputum. LS clear but diminished. Appetite fair. Denies nausea/vomiting. K 3.4 and replaced. Son at bedside early this am. Up to bathroom with stand by assist of 1. Gait steady.     1000 Ambulation in ceron encourage so SATS could be checked with activity. Pt refused to do any activity until son arrives at hospital. Will try again later this afternoon.

## 2017-02-17 NOTE — PLAN OF CARE
Problem: Goal Outcome Summary  Goal: Goal Outcome Summary  Outcome: Improving  A&O. Family present during shift.  services utilized. Physical assessment baseline. CMS & neuro intact. Denied pain. Denied numbness/ tingling. Droplet precautions. SBA with ambulation. IVF infusing. PIV WDL. LSC, diminished. BS +. LBM 2/16. Continue POC.

## 2017-02-18 ENCOUNTER — RECORDS - HEALTHEAST (OUTPATIENT)
Dept: ADMINISTRATIVE | Facility: OTHER | Age: 82
End: 2017-02-18

## 2017-02-18 VITALS
TEMPERATURE: 98.6 F | SYSTOLIC BLOOD PRESSURE: 130 MMHG | RESPIRATION RATE: 18 BRPM | DIASTOLIC BLOOD PRESSURE: 70 MMHG | HEART RATE: 76 BPM | OXYGEN SATURATION: 98 %

## 2017-02-18 LAB — POTASSIUM SERPL-SCNC: 3.3 MMOL/L (ref 3.4–5.3)

## 2017-02-18 PROCEDURE — 36415 COLL VENOUS BLD VENIPUNCTURE: CPT | Performed by: INTERNAL MEDICINE

## 2017-02-18 PROCEDURE — 90662 IIV NO PRSV INCREASED AG IM: CPT | Performed by: INTERNAL MEDICINE

## 2017-02-18 PROCEDURE — 25000128 H RX IP 250 OP 636: Performed by: INTERNAL MEDICINE

## 2017-02-18 PROCEDURE — 99238 HOSP IP/OBS DSCHRG MGMT 30/<: CPT | Performed by: INTERNAL MEDICINE

## 2017-02-18 PROCEDURE — 25000132 ZZH RX MED GY IP 250 OP 250 PS 637: Performed by: INTERNAL MEDICINE

## 2017-02-18 PROCEDURE — 84132 ASSAY OF SERUM POTASSIUM: CPT | Performed by: INTERNAL MEDICINE

## 2017-02-18 RX ORDER — POLYETHYLENE GLYCOL 3350 17 G/17G
17 POWDER, FOR SOLUTION ORAL DAILY
Qty: 30 PACKET | Refills: 3 | Status: SHIPPED | OUTPATIENT
Start: 2017-02-18 | End: 2021-11-26

## 2017-02-18 RX ORDER — POTASSIUM CHLORIDE 1500 MG/1
20 TABLET, EXTENDED RELEASE ORAL DAILY
Qty: 5 TABLET | Refills: 0 | Status: SHIPPED | OUTPATIENT
Start: 2017-02-18 | End: 2017-02-23

## 2017-02-18 RX ADMIN — AMLODIPINE BESYLATE 5 MG: 5 TABLET ORAL at 08:35

## 2017-02-18 RX ADMIN — INFLUENZA A VIRUS A/CALIFORNIA/7/2009 X-179A (H1N1) ANTIGEN (FORMALDEHYDE INACTIVATED), INFLUENZA A VIRUS A/HONG KONG/4801/2014 X-263B (H3N2) ANTIGEN (FORMALDEHYDE INACTIVATED), AND INFLUENZA B VIRUS B/BRISBANE/60/2008 ANTIGEN (FORMALDEHYDE INACTIVATED) 0.5 ML: 60; 60; 60 INJECTION, SUSPENSION INTRAMUSCULAR at 09:59

## 2017-02-18 RX ADMIN — PSYLLIUM HUSK 0.52 G: 20 CAPSULE ORAL at 08:35

## 2017-02-18 RX ADMIN — POTASSIUM CHLORIDE 40 MEQ: 1500 TABLET, EXTENDED RELEASE ORAL at 08:35

## 2017-02-18 RX ADMIN — OMEPRAZOLE 40 MG: 20 CAPSULE, DELAYED RELEASE ORAL at 08:35

## 2017-02-18 RX ADMIN — POLYETHYLENE GLYCOL 3350 17 G: 17 POWDER, FOR SOLUTION ORAL at 08:35

## 2017-02-18 RX ADMIN — MELATONIN TAB 3 MG 3 MG: 3 TAB at 00:46

## 2017-02-18 RX ADMIN — POTASSIUM CHLORIDE 20 MEQ: 1500 TABLET, EXTENDED RELEASE ORAL at 09:58

## 2017-02-18 NOTE — DISCHARGE INSTRUCTIONS
See primary doctor in 1-2 weeks to have repeat blood work performed (potassium level) and to have abdominal pain and rectal bleeding evaluated.   Call primary doctor or come to emergency room if you have worsening breathing, coughing, fever, worsening abdominal pain.

## 2017-02-18 NOTE — PLAN OF CARE
Problem: Individualization  Goal: Patient Preferences  Outcome: Adequate for Discharge Date Met:  02/18/17  Patient alert and oriented x 3. Up and ambulating to the bathroom independently. Patient denies any pain or SOB. Patient medically cleared to d/c home today. Discharge instructions reviewed with patient and son and they both verbalize understanding. D/C home at 11 am.

## 2017-02-18 NOTE — PROGRESS NOTES
Pt feels much better  Cough improved  Abd discomfort resolved  + 2 formed BM yesterday, no blood in stool    VSS  Alert, appears well  Lungs clear  CV rrr  Abd soft      Assessment    Influenza B infection, improved     Nausea with emesis prior to admission, secondary to influenza vs side effects of Tamiflu, resolved     Lower abd pain, possibly secondary to constipation vs muscle strain from coughing, resolved     Leukopenia (), improved  possibly secondary to viral illness      HTN, stable on amlodipine        Plan  D/c to home  Orders completed

## 2017-02-18 NOTE — DISCHARGE SUMMARY
DATE OF ADMISSION:  02/15/2017      DATE OF DISCHARGE: 02/18/2017      Radha Mcmanus is an 86-year-old woman who was admitted through the ER for the treatment of nausea, vomiting, abdominal pain in the setting of recently diagnosed and treated influenza B infection.  The patient had been started on empiric Tamiflu a few days prior to presentation to the ER.  She had subsequently developed persistent nausea, vomiting, abdominal pain.  In the ER, she was noted to be clinically dehydrated with a sodium 134, potassium 3.0 and also neutropenic with a white count 2300 with 42% neutrophils.  She was confirmed to be positive for influenza B.      The assessment at the time of admission was that the patient was ill secondary to influenza B and was likely experiencing side effects from Tamiflu.  She was hypokalemic secondary to persistent nausea and vomiting.  Her abdominal pain was felt most likely to be secondary to muscle strain.  The patient also, however, noted constipation over the previous several days associated with an episode of bright red blood alongside a firm stool.  Finally, the neutropenia was felt most likely to be secondary to her acute viral illness.      HOSPITAL COURSE:  The patient was admitted to the medical unit.  She was given intravenous fluids.  She was given nebulizer treatments p.r.n. for cough.  She was treated with laxatives.  Her course was marked by gradual improvement over the next few days.  Her nausea and vomiting resolved after admission, although her oral intake remained poor for 2 days.  She continued to have lower abdominal pain and constipation, both resolved with laxatives.  Her white blood cell count was monitored and did increase to 3,200 the day prior to discharge.  She had no blood per rectum during her hospitalization.  At the time of discharge, the patient was alert, fully oriented, felt much stronger, was ambulating about the unit, was eating well without nausea, vomiting and as  above, had had regular bowel movements without evidence of rectal bleeding.  Her abdominal pain on admission was largely resolved.      The patient will be discharged to home.      DISCHARGE DIAGNOSES:   1.  Acute influenza B infection.   2.  Nausea, vomiting, dehydration, hypokalemia, likely secondary to influenza B. versus side effects from Tamiflu, resolved.   3.  Abdominal pain secondary to muscle strain and constipation, resolved.   4.  Patient report of blood per rectum associated with hard stool.  This was not noted during this hospitalization and will need to be followed up after discharge.   5.  Hypertension, stable on amlodipine.   6.  Gastroesophageal reflux disease, stable on omeprazole.      DISCHARGE MEDICATIONS:   1.  Amlodipine 5 mg daily.   2.  Omeprazole 40 mg daily.   3.  MiraLax 17 grams daily.   4.  Metamucil once daily.   5.  Potassium 20 mEq daily to take for a 5-day course.       She was advised to see her primary doctor in 1-2 weeks to have blood work rechecked and to reassess her respiratory and GI status.         ALEJANDRINA AGUERO MD             D: 2017 09:50   T: 2017 10:09   MT: OUSMANE      Name:     LASHAY FABIAN   MRN:      [id number]        Account:        [id number]   :      1931           Admit Date:     [id number]                                  Discharge Date:       Document: [id number]

## 2017-02-18 NOTE — PLAN OF CARE
Problem: Goal Outcome Summary  Goal: Goal Outcome Summary  Outcome: Adequate for Discharge Date Met:  02/18/17  A&O. VSS. Lung sounds clear and equal bilaterally, slightly diminished in bases. Patient denies pain. Voiding well on own. IV saline locked.  Plan to discharge to home today.

## 2017-02-18 NOTE — PLAN OF CARE
Problem: Goal Outcome Summary  Goal: Goal Outcome Summary  Outcome: Improving  Pt has no complaints. Lungs clear, but dim. Pt reports having two stools today. Pt is SBA. Pt tolerating regular diet. Pt saline locked. Pt is appropriate for d/c.

## 2017-02-23 ENCOUNTER — OFFICE VISIT - HEALTHEAST (OUTPATIENT)
Dept: INTERNAL MEDICINE | Facility: CLINIC | Age: 82
End: 2017-02-23

## 2017-02-23 DIAGNOSIS — G89.4 CHRONIC PAIN SYNDROME: ICD-10-CM

## 2017-02-23 DIAGNOSIS — K59.04 CHRONIC IDIOPATHIC CONSTIPATION: ICD-10-CM

## 2017-02-23 DIAGNOSIS — J10.1 INFLUENZA B: ICD-10-CM

## 2017-02-23 DIAGNOSIS — R10.9 ABDOMINAL PAIN, UNSPECIFIED SITE: ICD-10-CM

## 2017-02-23 DIAGNOSIS — R51.9 HEADACHE: ICD-10-CM

## 2017-02-23 DIAGNOSIS — E87.6 HYPOKALEMIA: ICD-10-CM

## 2017-02-23 DIAGNOSIS — R07.89 OTHER CHEST PAIN: ICD-10-CM

## 2017-02-23 DIAGNOSIS — E03.9 HYPOTHYROIDISM: ICD-10-CM

## 2017-02-23 DIAGNOSIS — R10.9 ABDOMINAL PAIN: ICD-10-CM

## 2017-02-23 ASSESSMENT — MIFFLIN-ST. JEOR: SCORE: 1085.39

## 2017-03-13 ENCOUNTER — COMMUNICATION - HEALTHEAST (OUTPATIENT)
Dept: INTERNAL MEDICINE | Facility: CLINIC | Age: 82
End: 2017-03-13

## 2017-03-13 DIAGNOSIS — I10 UNSPECIFIED ESSENTIAL HYPERTENSION: ICD-10-CM

## 2017-06-03 ENCOUNTER — COMMUNICATION - HEALTHEAST (OUTPATIENT)
Dept: INTERNAL MEDICINE | Facility: CLINIC | Age: 82
End: 2017-06-03

## 2017-06-03 DIAGNOSIS — K21.9 GERD (GASTROESOPHAGEAL REFLUX DISEASE): ICD-10-CM

## 2017-06-13 ENCOUNTER — OFFICE VISIT - HEALTHEAST (OUTPATIENT)
Dept: INTERNAL MEDICINE | Facility: CLINIC | Age: 82
End: 2017-06-13

## 2017-06-13 DIAGNOSIS — K59.00 CONSTIPATION: ICD-10-CM

## 2017-06-13 DIAGNOSIS — H10.13 CONJUNCTIVITIS, ALLERGIC, BILATERAL: ICD-10-CM

## 2017-06-13 DIAGNOSIS — I10 ESSENTIAL HYPERTENSION: ICD-10-CM

## 2017-06-13 DIAGNOSIS — J30.9 ALLERGIC RHINITIS: ICD-10-CM

## 2017-06-13 DIAGNOSIS — H10.10 ALLERGIC CONJUNCTIVITIS: ICD-10-CM

## 2017-06-13 DIAGNOSIS — E03.9 HYPOTHYROIDISM: ICD-10-CM

## 2017-06-13 DIAGNOSIS — G89.4 CHRONIC PAIN SYNDROME: ICD-10-CM

## 2017-06-13 DIAGNOSIS — M54.2 CERVICALGIA: ICD-10-CM

## 2017-06-13 ASSESSMENT — MIFFLIN-ST. JEOR: SCORE: 1069.06

## 2017-06-14 ENCOUNTER — COMMUNICATION - HEALTHEAST (OUTPATIENT)
Dept: INTERNAL MEDICINE | Facility: CLINIC | Age: 82
End: 2017-06-14

## 2017-07-06 ENCOUNTER — COMMUNICATION - HEALTHEAST (OUTPATIENT)
Dept: INTERNAL MEDICINE | Facility: CLINIC | Age: 82
End: 2017-07-06

## 2017-07-06 ENCOUNTER — OFFICE VISIT - HEALTHEAST (OUTPATIENT)
Dept: INTERNAL MEDICINE | Facility: CLINIC | Age: 82
End: 2017-07-06

## 2017-07-06 ENCOUNTER — HOSPITAL ENCOUNTER (OUTPATIENT)
Dept: CT IMAGING | Facility: CLINIC | Age: 82
Discharge: HOME | End: 2017-07-06
Attending: INTERNAL MEDICINE

## 2017-07-06 DIAGNOSIS — G47.00 INSOMNIA, UNSPECIFIED: ICD-10-CM

## 2017-07-06 DIAGNOSIS — R07.89 CHEST WALL PAIN: ICD-10-CM

## 2017-07-06 DIAGNOSIS — G89.4 CHRONIC PAIN SYNDROME: ICD-10-CM

## 2017-07-06 DIAGNOSIS — F41.1 ANXIETY STATE: ICD-10-CM

## 2017-07-06 DIAGNOSIS — K21.00 REFLUX ESOPHAGITIS: ICD-10-CM

## 2017-07-06 ASSESSMENT — MIFFLIN-ST. JEOR: SCORE: 1067.7

## 2017-07-13 ENCOUNTER — OFFICE VISIT - HEALTHEAST (OUTPATIENT)
Dept: INTERNAL MEDICINE | Facility: CLINIC | Age: 82
End: 2017-07-13

## 2017-07-13 DIAGNOSIS — M81.0 OSTEOPOROSIS: ICD-10-CM

## 2017-07-13 DIAGNOSIS — M54.9 BACK PAIN: ICD-10-CM

## 2017-07-13 DIAGNOSIS — R07.89 CHEST WALL PAIN: ICD-10-CM

## 2017-07-13 DIAGNOSIS — G89.4 CHRONIC PAIN SYNDROME: ICD-10-CM

## 2017-07-17 ENCOUNTER — COMMUNICATION - HEALTHEAST (OUTPATIENT)
Dept: INTERNAL MEDICINE | Facility: CLINIC | Age: 82
End: 2017-07-17

## 2017-07-18 ENCOUNTER — RECORDS - HEALTHEAST (OUTPATIENT)
Dept: ADMINISTRATIVE | Facility: OTHER | Age: 82
End: 2017-07-18

## 2017-09-03 ENCOUNTER — COMMUNICATION - HEALTHEAST (OUTPATIENT)
Dept: INTERNAL MEDICINE | Facility: CLINIC | Age: 82
End: 2017-09-03

## 2017-10-03 ENCOUNTER — OFFICE VISIT - HEALTHEAST (OUTPATIENT)
Dept: INTERNAL MEDICINE | Facility: CLINIC | Age: 82
End: 2017-10-03

## 2017-10-03 DIAGNOSIS — D72.819 LEUKOPENIA: ICD-10-CM

## 2017-10-03 DIAGNOSIS — G89.29 CHRONIC PAIN: ICD-10-CM

## 2017-10-03 DIAGNOSIS — Z23 INFLUENZA VACCINATION GIVEN: ICD-10-CM

## 2017-10-03 DIAGNOSIS — E87.6 LOW BLOOD POTASSIUM: ICD-10-CM

## 2017-10-03 DIAGNOSIS — E55.9 VITAMIN D DEFICIENCY: ICD-10-CM

## 2017-10-03 DIAGNOSIS — I10 HTN (HYPERTENSION): ICD-10-CM

## 2017-10-03 DIAGNOSIS — L29.9 ITCHY SKIN: ICD-10-CM

## 2017-10-03 ASSESSMENT — MIFFLIN-ST. JEOR: SCORE: 1072.07

## 2017-10-06 ENCOUNTER — COMMUNICATION - HEALTHEAST (OUTPATIENT)
Dept: INTERNAL MEDICINE | Facility: CLINIC | Age: 82
End: 2017-10-06

## 2017-11-13 ENCOUNTER — COMMUNICATION - HEALTHEAST (OUTPATIENT)
Dept: SCHEDULING | Facility: CLINIC | Age: 82
End: 2017-11-13

## 2017-11-16 ENCOUNTER — OFFICE VISIT - HEALTHEAST (OUTPATIENT)
Dept: INTERNAL MEDICINE | Facility: CLINIC | Age: 82
End: 2017-11-16

## 2017-11-16 DIAGNOSIS — R21 RASH: ICD-10-CM

## 2017-11-16 DIAGNOSIS — L29.9 ITCHY SKIN: ICD-10-CM

## 2017-11-16 DIAGNOSIS — R42 VERTIGO: ICD-10-CM

## 2017-11-30 ENCOUNTER — COMMUNICATION - HEALTHEAST (OUTPATIENT)
Dept: INTERNAL MEDICINE | Facility: CLINIC | Age: 82
End: 2017-11-30

## 2017-11-30 ENCOUNTER — RECORDS - HEALTHEAST (OUTPATIENT)
Dept: ADMINISTRATIVE | Facility: OTHER | Age: 82
End: 2017-11-30

## 2017-11-30 DIAGNOSIS — E55.9 VITAMIN D DEFICIENCY: ICD-10-CM

## 2017-12-12 ENCOUNTER — COMMUNICATION - HEALTHEAST (OUTPATIENT)
Dept: INTERNAL MEDICINE | Facility: CLINIC | Age: 82
End: 2017-12-12

## 2017-12-12 DIAGNOSIS — J30.9 ALLERGIC RHINITIS: ICD-10-CM

## 2018-01-25 ENCOUNTER — OFFICE VISIT - HEALTHEAST (OUTPATIENT)
Dept: INTERNAL MEDICINE | Facility: CLINIC | Age: 83
End: 2018-01-25

## 2018-01-25 DIAGNOSIS — L30.9 DERMATITIS: ICD-10-CM

## 2018-01-29 LAB
QTF INTERPRETATION: ABNORMAL
QTF MITOGEN - NIL: >10 IU/ML
QTF NIL: 0.1 IU/ML
QTF RESULT: POSITIVE
QTF TB ANTIGEN - NIL: 2.4 IU/ML

## 2018-01-31 ENCOUNTER — COMMUNICATION - HEALTHEAST (OUTPATIENT)
Dept: INTERNAL MEDICINE | Facility: CLINIC | Age: 83
End: 2018-01-31

## 2018-01-31 DIAGNOSIS — I10 ESSENTIAL HYPERTENSION: ICD-10-CM

## 2018-02-14 ENCOUNTER — HOSPITAL ENCOUNTER (EMERGENCY)
Facility: CLINIC | Age: 83
Discharge: HOME | End: 2018-02-14
Attending: FAMILY MEDICINE | Admitting: FAMILY MEDICINE
Payer: COMMERCIAL

## 2018-02-14 ENCOUNTER — RECORDS - HEALTHEAST (OUTPATIENT)
Dept: ADMINISTRATIVE | Facility: OTHER | Age: 83
End: 2018-02-14

## 2018-02-14 VITALS
WEIGHT: 140 LBS | RESPIRATION RATE: 16 BRPM | BODY MASS INDEX: 21.93 KG/M2 | TEMPERATURE: 99.9 F | DIASTOLIC BLOOD PRESSURE: 70 MMHG | OXYGEN SATURATION: 98 % | HEART RATE: 98 BPM | SYSTOLIC BLOOD PRESSURE: 135 MMHG

## 2018-02-14 DIAGNOSIS — B34.9 VIRAL SYNDROME: ICD-10-CM

## 2018-02-14 DIAGNOSIS — J06.9 UPPER RESPIRATORY TRACT INFECTION, UNSPECIFIED TYPE: ICD-10-CM

## 2018-02-14 LAB
FLUAV+FLUBV AG SPEC QL: NEGATIVE
FLUAV+FLUBV AG SPEC QL: NEGATIVE
SPECIMEN SOURCE: NORMAL

## 2018-02-14 PROCEDURE — 99284 EMERGENCY DEPT VISIT MOD MDM: CPT | Mod: Z6 | Performed by: FAMILY MEDICINE

## 2018-02-14 PROCEDURE — 25000128 H RX IP 250 OP 636: Performed by: FAMILY MEDICINE

## 2018-02-14 PROCEDURE — 87804 INFLUENZA ASSAY W/OPTIC: CPT | Performed by: FAMILY MEDICINE

## 2018-02-14 PROCEDURE — 96372 THER/PROPH/DIAG INJ SC/IM: CPT | Performed by: FAMILY MEDICINE

## 2018-02-14 PROCEDURE — 99284 EMERGENCY DEPT VISIT MOD MDM: CPT | Mod: 25 | Performed by: FAMILY MEDICINE

## 2018-02-14 RX ORDER — IBUPROFEN 800 MG/1
800 TABLET, FILM COATED ORAL EVERY 8 HOURS PRN
Qty: 15 TABLET | Refills: 0 | Status: SHIPPED | OUTPATIENT
Start: 2018-02-14 | End: 2018-02-22

## 2018-02-14 RX ORDER — KETOROLAC TROMETHAMINE 15 MG/ML
15 INJECTION, SOLUTION INTRAMUSCULAR; INTRAVENOUS ONCE
Status: COMPLETED | OUTPATIENT
Start: 2018-02-14 | End: 2018-02-14

## 2018-02-14 RX ADMIN — KETOROLAC TROMETHAMINE 15 MG: 15 INJECTION, SOLUTION INTRAMUSCULAR; INTRAVENOUS at 13:11

## 2018-02-14 ASSESSMENT — ENCOUNTER SYMPTOMS
ABDOMINAL PAIN: 0
DIARRHEA: 0
SORE THROAT: 1
DYSURIA: 0
HEMATURIA: 0
VOMITING: 0
NAUSEA: 0
SHORTNESS OF BREATH: 0
FATIGUE: 1
FEVER: 1
COUGH: 0

## 2018-02-14 NOTE — ED PROVIDER NOTES
History     Chief Complaint   Patient presents with     Flu Symptoms     cough, fever, coughing up white mucus for 3 days     A  was used (Daughter translating, at the patient's request).     Radha Mcmanus is a 87 year old female with a history of hypertension who presents for evaluation of flu symptoms. The patient reports that 3 days ago she developed malaise with subjective fevers and cough productive of white-yellow sputum. Due to persistence of symptoms, the patient is brought in now by her daughter-in-law for evaluation. The patient denies any abdominal pain or urinary symptoms. She denies sore throat. She has not measured her temperature at home. The patient has a history of hypertension but states that she is otherwise generally healthy. She states that she's had no known recent sick contact.      No current facility-administered medications for this encounter.      Current Outpatient Prescriptions   Medication     ibuprofen (ADVIL/MOTRIN) 800 MG tablet     polyethylene glycol (MIRALAX/GLYCOLAX) Packet     psyllium 0.52 G capsule     ORDER FOR DME     OMEPRAZOLE PO     amLODIPine (NORVASC) 5 MG tablet     acetaminophen (TYLENOL) 500 MG tablet     Past Medical History:   Diagnosis Date     Hypertension      Ulcer (H)        Past Surgical History:   Procedure Laterality Date     ORTHOPEDIC SURGERY         No family history on file.    Social History   Substance Use Topics     Smoking status: Never Smoker     Smokeless tobacco: Never Used     Alcohol use No     Allergies   Allergen Reactions     Contrast Dye      Erythromycin        I have reviewed the Medications, Allergies, Past Medical and Surgical History, and Social History in the Epic system.    Review of Systems   Constitutional: Positive for fatigue and fever (subjective).   HENT: Positive for sore throat.    Respiratory: Negative for cough and shortness of breath.    Cardiovascular: Negative for chest pain.   Gastrointestinal:  Negative for abdominal pain, diarrhea, nausea and vomiting.   Genitourinary: Negative for dysuria and hematuria.   All other systems reviewed and are negative.      Physical Exam   BP: 141/82  Pulse: 98  Heart Rate: 78  Temp: 99.9  F (37.7  C)  Resp: 19  Weight: 63.5 kg (140 lb)  SpO2: 98 %      Physical Exam   Constitutional: She is oriented to person, place, and time. No distress.   HENT:   Head: Atraumatic.   Mouth/Throat: Oropharynx is clear and moist. No oropharyngeal exudate.   Eyes: Pupils are equal, round, and reactive to light. No scleral icterus.   Cardiovascular: Normal heart sounds and intact distal pulses.    Pulmonary/Chest: Breath sounds normal. No respiratory distress.   Abdominal: Soft. Bowel sounds are normal. There is no tenderness.   Musculoskeletal: She exhibits no edema or tenderness.   Neurological: She is alert and oriented to person, place, and time. No cranial nerve deficit. She exhibits normal muscle tone. Coordination normal.   Skin: Skin is warm. No rash noted. She is not diaphoretic.       ED Course     ED Course     Procedures         Critical Care time:  none      Labs Ordered and Resulted from Time of ED Arrival Up to the Time of Departure from the ED   INFLUENZA A/B ANTIGEN         Assessments & Plan (with Medical Decision Making)       I have reviewed the nursing notes.    I have reviewed the findings, diagnosis, plan and need for follow up with the patient.  Patient with upper respiratory infection viral syndrome will be treated with anti-inflammatories rest and follow-up with primary MD for repeat if developing increased fevers shortness of breath or weakness.    Discharge Medication List as of 2/14/2018  2:23 PM      START taking these medications    Details   ibuprofen (ADVIL/MOTRIN) 800 MG tablet Take 1 tablet (800 mg) by mouth every 8 hours as needed, Disp-15 tablet, R-0, Local Print             Final diagnoses:   Upper respiratory tract infection, unspecified type   Viral  syndrome     I, Francisco Kelsey, am serving as a trained medical scribe to document services personally performed by Simeon Milligan MD, based on the provider's statements to me.      I, Simeon Milligan MD, was physically present and have reviewed and verified the accuracy of this note documented by Francisco Kelsey.    2/14/2018   [facility], EMERGENCY DEPARTMENT     Simeon Milligan MD  02/17/18 3606

## 2018-02-14 NOTE — ED AVS SNAPSHOT
[facility], Emergency Department    [address]    [facility] [address]    Phone:  [phone]    Fax:  [phone]                                       Radha Mcmanus   MRN: [id number]    Department:  [facility], Emergency Department   Date of Visit:  2/14/2018           After Visit Summary Signature Page     I have received my discharge instructions, and my questions have been answered. I have discussed any challenges I see with this plan with the nurse or doctor.    ..........................................................................................................................................  Patient/Patient Representative Signature      ..........................................................................................................................................  Patient Representative Print Name and Relationship to Patient    ..................................................               ................................................  Date                                            Time    ..........................................................................................................................................  Reviewed by Signature/Title    ...................................................              ..............................................  Date                                                            Time

## 2018-02-14 NOTE — ED AVS SNAPSHOT
[facility], Emergency Department    [address]    [place] [address]    Phone:  [phone]    Fax:  [phone]                                       Radha Mcmanus   MRN: [id number]    Department:  [facility], Emergency Department   Date of Visit:  2/14/2018           Patient Information     Date Of Birth          1/1/1931        Your diagnoses for this visit were:     Upper respiratory tract infection, unspecified type     Viral syndrome        You were seen by Simeon Milligan MD.      Follow-up Information     Follow up with Lawrence Mckeon MD.    Specialty:  Internal Medicine    Why:  this week for recheck    Contact information:    [facility]  [address] [facility] [id number] [phone]        24 Hour Appointment Hotline       To make an appointment at any [facility], call [phone] ([phone]). If you don't have a family doctor or clinic, we will help you find one. [facility] are conveniently located to serve the needs of you and your family.             Review of your medicines      START taking        Dose / Directions Last dose taken    ibuprofen 800 MG tablet   Commonly known as:  ADVIL/MOTRIN   Dose:  800 mg   Quantity:  15 tablet        Take 1 tablet (800 mg) by mouth every 8 hours as needed   Refills:  0          Our records show that you are taking the medicines listed below. If these are incorrect, please call your family doctor or clinic.        Dose / Directions Last dose taken    acetaminophen 500 MG tablet   Commonly known as:  TYLENOL   Dose:  1-2 tablet   Quantity:  30 tablet        Take 1-2 tablets by mouth every 6 hours as needed for pain and fever.   Refills:  0        NORVASC 5 MG tablet   Dose:  5 mg   Generic drug:  amLODIPine        Take 5 mg by mouth daily.   Refills:  0        OMEPRAZOLE PO   Dose:  40 mg        Take 40 mg by mouth every morning   Refills:  0        order for DME   Quantity:  1 each        Equipment  "being ordered: Long handled shoe horn and reacher grabber   Refills:  0        polyethylene glycol Packet   Commonly known as:  MIRALAX/GLYCOLAX   Dose:  17 g   Quantity:  30 packet        Take 17 g by mouth daily   Refills:  3        psyllium 0.52 G capsule   Dose:  0.52 g   Quantity:  540 capsule        Take 1 capsule (0.52 g) by mouth daily   Refills:  3                Prescriptions were sent or printed at these locations (1 Prescription)                   Other Prescriptions                Printed at Department/Unit printer (1 of 1)         ibuprofen (ADVIL/MOTRIN) 800 MG tablet                Procedures and tests performed during your visit     Influenza A/B antigen swab      Orders Needing Specimen Collection     None      Pending Results     No orders found from 2/12/2018 to 2/15/2018.            Pending Culture Results     No orders found from 2/12/2018 to 2/15/2018.            Pending Results Instructions     If you had any lab results that were not finalized at the time of your Discharge, you can call the ED Lab Result RN at [phone]. You will be contacted by this team for any positive Lab results or changes in treatment. The nurses are available 7 days a week from 10A to 6:30P.  You can leave a message 24 hours per day and they will return your call.        Thank you for choosing [facility]       Thank you for choosing [facility] for your care. Our goal is always to provide you with excellent care. Hearing back from our patients is one way we can continue to improve our services. Please take a few minutes to complete the written survey that you may receive in the mail after you visit with us. Thank you!        [email or web address] Information     [employer] lets you send messages to your doctor, view your test results, renew your prescriptions, schedule appointments and more. To sign up, go to [email or web address] . Click on \"Log in\" on the left side of the screen, which will take you to the Welcome page. Then click " "on \"Sign up Now\" on the right side of the page.     You will be asked to enter the access code listed below, as well as some personal information. Please follow the directions to create your username and password.     Your access code is: [email or web address]  Expires: 5/15/2018  2:13 PM     Your access code will  in 90 days. If you need help or a new code, please call your [facility] clinic or [phone].        Care EveryWhere ID     This is your Care EveryWhere ID. This could be used by other organizations to access your [facility] medical records  [id number]        Equal Access to Services     [facility] : Sravani Og, guilherme quiñones, victor hugo thompson, gennaro saab. So [facility] [phone].    ATENCIÓN: Si habla español, tiene a ramirez disposición servicios gratuitos de asistencia lingüística. Llame al [phone].    We comply with applicable federal civil rights laws and [place] laws. We do not discriminate on the basis of race, color, national origin, age, disability, sex, sexual orientation, or gender identity.            After Visit Summary       This is your record. Keep this with you and show to your community pharmacist(s) and doctor(s) at your next visit.                  "

## 2018-02-14 NOTE — ED NOTES
Pt has had cough, fever, chills. Fatigue and body aches for 3 days.    Tool ibuprofen earliar today.

## 2018-03-06 ENCOUNTER — COMMUNICATION - HEALTHEAST (OUTPATIENT)
Dept: INTERNAL MEDICINE | Facility: CLINIC | Age: 83
End: 2018-03-06

## 2018-03-06 DIAGNOSIS — E55.9 VITAMIN D DEFICIENCY: ICD-10-CM

## 2018-03-14 ENCOUNTER — OFFICE VISIT - HEALTHEAST (OUTPATIENT)
Dept: INTERNAL MEDICINE | Facility: CLINIC | Age: 83
End: 2018-03-14

## 2018-03-14 DIAGNOSIS — L30.9 DERMATITIS: ICD-10-CM

## 2018-03-14 DIAGNOSIS — R76.12 POSITIVE QUANTIFERON-TB GOLD TEST: ICD-10-CM

## 2018-03-14 LAB — ERYTHROCYTE [SEDIMENTATION RATE] IN BLOOD BY WESTERGREN METHOD: 55 MM/HR (ref 0–20)

## 2018-03-17 LAB
DEPRECATED MISC ALLERGEN IGE RAST QL: NORMAL
GLUTEN IGE QN: <0.1 KU(A)/L
MANGO IGE QN: <0.1 KU/L

## 2018-03-19 ENCOUNTER — COMMUNICATION - HEALTHEAST (OUTPATIENT)
Dept: INTERNAL MEDICINE | Facility: CLINIC | Age: 83
End: 2018-03-19

## 2018-03-19 LAB — ANA SER QL: 0.4 U

## 2018-04-11 ENCOUNTER — RECORDS - HEALTHEAST (OUTPATIENT)
Dept: ADMINISTRATIVE | Facility: OTHER | Age: 83
End: 2018-04-11

## 2018-06-05 ENCOUNTER — OFFICE VISIT - HEALTHEAST (OUTPATIENT)
Dept: INTERNAL MEDICINE | Facility: CLINIC | Age: 83
End: 2018-06-05

## 2018-06-05 DIAGNOSIS — N64.4 BREAST PAIN, LEFT: ICD-10-CM

## 2018-06-05 DIAGNOSIS — E55.9 VITAMIN D DEFICIENCY: ICD-10-CM

## 2018-06-05 DIAGNOSIS — I10 ESSENTIAL HYPERTENSION: ICD-10-CM

## 2018-06-05 DIAGNOSIS — L30.9 DERMATITIS: ICD-10-CM

## 2018-06-05 DIAGNOSIS — M54.50 LOW BACK PAIN: ICD-10-CM

## 2018-06-05 DIAGNOSIS — Z78.0 POST-MENOPAUSAL: ICD-10-CM

## 2018-06-05 ASSESSMENT — MIFFLIN-ST. JEOR: SCORE: 1069.52

## 2018-07-23 ENCOUNTER — COMMUNICATION - HEALTHEAST (OUTPATIENT)
Dept: INTERNAL MEDICINE | Facility: CLINIC | Age: 83
End: 2018-07-23

## 2018-07-23 DIAGNOSIS — E55.9 VITAMIN D DEFICIENCY: ICD-10-CM

## 2018-08-20 ENCOUNTER — COMMUNICATION - HEALTHEAST (OUTPATIENT)
Dept: INTERNAL MEDICINE | Facility: CLINIC | Age: 83
End: 2018-08-20

## 2018-08-20 DIAGNOSIS — K21.9 GERD (GASTROESOPHAGEAL REFLUX DISEASE): ICD-10-CM

## 2018-09-17 ENCOUNTER — OFFICE VISIT - HEALTHEAST (OUTPATIENT)
Dept: INTERNAL MEDICINE | Facility: CLINIC | Age: 83
End: 2018-09-17

## 2018-09-17 DIAGNOSIS — G44.89 OTHER HEADACHE SYNDROME: ICD-10-CM

## 2018-09-17 DIAGNOSIS — G47.9 SLEEP DISORDER: ICD-10-CM

## 2018-09-17 DIAGNOSIS — F41.1 ANXIETY STATE: ICD-10-CM

## 2018-09-20 ENCOUNTER — HOSPITAL ENCOUNTER (OUTPATIENT)
Dept: MAMMOGRAPHY | Facility: CLINIC | Age: 83
Discharge: HOME | End: 2018-09-20
Attending: INTERNAL MEDICINE

## 2018-09-20 ENCOUNTER — HOSPITAL ENCOUNTER (OUTPATIENT)
Dept: ULTRASOUND IMAGING | Facility: CLINIC | Age: 83
Discharge: HOME | End: 2018-09-20
Attending: INTERNAL MEDICINE

## 2018-09-20 DIAGNOSIS — N64.4 BREAST PAIN, LEFT: ICD-10-CM

## 2018-10-02 ENCOUNTER — RECORDS - HEALTHEAST (OUTPATIENT)
Dept: ADMINISTRATIVE | Facility: OTHER | Age: 83
End: 2018-10-02

## 2018-10-02 ENCOUNTER — RECORDS - HEALTHEAST (OUTPATIENT)
Dept: BONE DENSITY | Facility: CLINIC | Age: 83
End: 2018-10-02

## 2018-10-02 DIAGNOSIS — Z78.0 ASYMPTOMATIC MENOPAUSAL STATE: ICD-10-CM

## 2018-10-10 ENCOUNTER — COMMUNICATION - HEALTHEAST (OUTPATIENT)
Dept: INTERNAL MEDICINE | Facility: CLINIC | Age: 83
End: 2018-10-10

## 2018-10-23 ENCOUNTER — OFFICE VISIT - HEALTHEAST (OUTPATIENT)
Dept: INTERNAL MEDICINE | Facility: CLINIC | Age: 83
End: 2018-10-23

## 2018-10-23 ENCOUNTER — COMMUNICATION - HEALTHEAST (OUTPATIENT)
Dept: INTERNAL MEDICINE | Facility: CLINIC | Age: 83
End: 2018-10-23

## 2018-10-23 DIAGNOSIS — L29.9 ITCHING: ICD-10-CM

## 2018-10-23 DIAGNOSIS — M25.511 RIGHT SHOULDER PAIN: ICD-10-CM

## 2018-10-23 DIAGNOSIS — L30.0 NUMMULAR DERMATITIS: ICD-10-CM

## 2018-10-23 DIAGNOSIS — E55.9 VITAMIN D DEFICIENCY: ICD-10-CM

## 2018-10-23 DIAGNOSIS — Z23 FLU VACCINE NEED: ICD-10-CM

## 2018-10-23 DIAGNOSIS — R53.83 FATIGUE: ICD-10-CM

## 2018-10-23 DIAGNOSIS — G47.00 INSOMNIA, UNSPECIFIED: ICD-10-CM

## 2018-10-23 DIAGNOSIS — M81.0 OSTEOPOROSIS: ICD-10-CM

## 2018-10-23 DIAGNOSIS — I10 ESSENTIAL HYPERTENSION: ICD-10-CM

## 2018-10-23 LAB
ALBUMIN SERPL-MCNC: 3.8 G/DL (ref 3.5–5)
ALP SERPL-CCNC: 67 U/L (ref 45–120)
ALT SERPL W P-5'-P-CCNC: 13 U/L (ref 0–45)
ANION GAP SERPL CALCULATED.3IONS-SCNC: 11 MMOL/L (ref 5–18)
AST SERPL W P-5'-P-CCNC: 19 U/L (ref 0–40)
BILIRUB DIRECT SERPL-MCNC: 0.2 MG/DL
BILIRUB SERPL-MCNC: 0.7 MG/DL (ref 0–1)
BUN SERPL-MCNC: 10 MG/DL (ref 8–28)
CALCIUM SERPL-MCNC: 9.9 MG/DL (ref 8.5–10.5)
CHLORIDE BLD-SCNC: 104 MMOL/L (ref 98–107)
CO2 SERPL-SCNC: 28 MMOL/L (ref 22–31)
CREAT SERPL-MCNC: 0.74 MG/DL (ref 0.6–1.1)
ERYTHROCYTE [DISTWIDTH] IN BLOOD BY AUTOMATED COUNT: 11.9 % (ref 11–14.5)
GFR SERPL CREATININE-BSD FRML MDRD: >60 ML/MIN/1.73M2
GLUCOSE BLD-MCNC: 115 MG/DL (ref 70–125)
HCT VFR BLD AUTO: 39.9 % (ref 35–47)
HGB BLD-MCNC: 13.4 G/DL (ref 12–16)
MCH RBC QN AUTO: 29.4 PG (ref 27–34)
MCHC RBC AUTO-ENTMCNC: 33.5 G/DL (ref 32–36)
MCV RBC AUTO: 88 FL (ref 80–100)
PLATELET # BLD AUTO: 127 THOU/UL (ref 140–440)
PMV BLD AUTO: 8.5 FL (ref 7–10)
POTASSIUM BLD-SCNC: 3.5 MMOL/L (ref 3.5–5)
PROT SERPL-MCNC: 7.5 G/DL (ref 6–8)
RBC # BLD AUTO: 4.55 MILL/UL (ref 3.8–5.4)
SODIUM SERPL-SCNC: 143 MMOL/L (ref 136–145)
TSH SERPL DL<=0.005 MIU/L-ACNC: 2.95 UIU/ML (ref 0.3–5)
WBC: 3.7 THOU/UL (ref 4–11)

## 2018-10-24 ENCOUNTER — COMMUNICATION - HEALTHEAST (OUTPATIENT)
Dept: INTERNAL MEDICINE | Facility: CLINIC | Age: 83
End: 2018-10-24

## 2018-10-24 LAB — 25(OH)D3 SERPL-MCNC: 32 NG/ML (ref 30–80)

## 2018-10-25 ENCOUNTER — COMMUNICATION - HEALTHEAST (OUTPATIENT)
Dept: INTERNAL MEDICINE | Facility: CLINIC | Age: 83
End: 2018-10-25

## 2018-10-25 DIAGNOSIS — I10 ESSENTIAL HYPERTENSION: ICD-10-CM

## 2018-12-28 ENCOUNTER — COMMUNICATION - HEALTHEAST (OUTPATIENT)
Dept: INTERNAL MEDICINE | Facility: CLINIC | Age: 83
End: 2018-12-28

## 2018-12-28 ENCOUNTER — OFFICE VISIT - HEALTHEAST (OUTPATIENT)
Dept: INTERNAL MEDICINE | Facility: CLINIC | Age: 83
End: 2018-12-28

## 2018-12-28 DIAGNOSIS — H81.12 BENIGN PAROXYSMAL POSITIONAL VERTIGO, LEFT: ICD-10-CM

## 2019-01-08 ENCOUNTER — OFFICE VISIT - HEALTHEAST (OUTPATIENT)
Dept: OCCUPATIONAL THERAPY | Facility: REHABILITATION | Age: 84
End: 2019-01-08

## 2019-01-08 DIAGNOSIS — R26.81 UNSTEADINESS ON FEET: ICD-10-CM

## 2019-01-08 DIAGNOSIS — H81.11 BENIGN PAROXYSMAL POSITIONAL VERTIGO OF RIGHT EAR: ICD-10-CM

## 2019-01-08 DIAGNOSIS — Z78.9 DECREASED ACTIVITIES OF DAILY LIVING (ADL): ICD-10-CM

## 2019-01-08 DIAGNOSIS — R42 VERTIGO: ICD-10-CM

## 2019-01-23 ENCOUNTER — OFFICE VISIT - HEALTHEAST (OUTPATIENT)
Dept: INTERNAL MEDICINE | Facility: CLINIC | Age: 84
End: 2019-01-23

## 2019-01-23 DIAGNOSIS — M54.50 CHRONIC MIDLINE LOW BACK PAIN WITHOUT SCIATICA: ICD-10-CM

## 2019-01-23 DIAGNOSIS — D72.819 LEUKOPENIA, UNSPECIFIED TYPE: ICD-10-CM

## 2019-01-23 DIAGNOSIS — G89.29 CHRONIC MIDLINE LOW BACK PAIN WITHOUT SCIATICA: ICD-10-CM

## 2019-01-23 DIAGNOSIS — R30.0 DYSURIA: ICD-10-CM

## 2019-01-23 DIAGNOSIS — L84 CORN OF TOE: ICD-10-CM

## 2019-01-23 DIAGNOSIS — H81.10 BENIGN PAROXYSMAL POSITIONAL VERTIGO, UNSPECIFIED LATERALITY: ICD-10-CM

## 2019-01-23 DIAGNOSIS — M54.2 NECK PAIN: ICD-10-CM

## 2019-01-23 DIAGNOSIS — R73.09 ABNORMAL GLUCOSE: ICD-10-CM

## 2019-01-23 LAB
ALBUMIN UR-MCNC: NEGATIVE MG/DL
APPEARANCE UR: CLEAR
BACTERIA #/AREA URNS HPF: ABNORMAL HPF
BASOPHILS # BLD AUTO: 0 THOU/UL (ref 0–0.2)
BASOPHILS NFR BLD AUTO: 1 % (ref 0–2)
BILIRUB UR QL STRIP: NEGATIVE
COLOR UR AUTO: YELLOW
EOSINOPHIL # BLD AUTO: 0.2 THOU/UL (ref 0–0.4)
EOSINOPHIL NFR BLD AUTO: 5 % (ref 0–6)
ERYTHROCYTE [DISTWIDTH] IN BLOOD BY AUTOMATED COUNT: 12.3 % (ref 11–14.5)
GLUCOSE UR STRIP-MCNC: NEGATIVE MG/DL
HBA1C MFR BLD: 5.4 % (ref 3.5–6)
HCT VFR BLD AUTO: 37.8 % (ref 35–47)
HGB BLD-MCNC: 12.7 G/DL (ref 12–16)
HGB UR QL STRIP: ABNORMAL
KETONES UR STRIP-MCNC: NEGATIVE MG/DL
LEUKOCYTE ESTERASE UR QL STRIP: ABNORMAL
LYMPHOCYTES # BLD AUTO: 2 THOU/UL (ref 0.8–4.4)
LYMPHOCYTES NFR BLD AUTO: 38 % (ref 20–40)
MCH RBC QN AUTO: 29.5 PG (ref 27–34)
MCHC RBC AUTO-ENTMCNC: 33.5 G/DL (ref 32–36)
MCV RBC AUTO: 88 FL (ref 80–100)
MONOCYTES # BLD AUTO: 0.3 THOU/UL (ref 0–0.9)
MONOCYTES NFR BLD AUTO: 6 % (ref 2–10)
NEUTROPHILS # BLD AUTO: 2.6 THOU/UL (ref 2–7.7)
NEUTROPHILS NFR BLD AUTO: 51 % (ref 50–70)
NITRATE UR QL: NEGATIVE
PH UR STRIP: 5.5 [PH] (ref 5–8)
PLATELET # BLD AUTO: 152 THOU/UL (ref 140–440)
PMV BLD AUTO: 8.3 FL (ref 7–10)
RBC # BLD AUTO: 4.3 MILL/UL (ref 3.8–5.4)
RBC #/AREA URNS AUTO: ABNORMAL HPF
SP GR UR STRIP: 1.02 (ref 1–1.03)
UROBILINOGEN UR STRIP-ACNC: ABNORMAL
WBC #/AREA URNS AUTO: ABNORMAL HPF
WBC: 5.1 THOU/UL (ref 4–11)

## 2019-01-24 ENCOUNTER — COMMUNICATION - HEALTHEAST (OUTPATIENT)
Dept: INTERNAL MEDICINE | Facility: CLINIC | Age: 84
End: 2019-01-24

## 2019-01-24 LAB — BACTERIA SPEC CULT: NO GROWTH

## 2019-02-12 ENCOUNTER — OFFICE VISIT - HEALTHEAST (OUTPATIENT)
Dept: PHYSICAL THERAPY | Facility: REHABILITATION | Age: 84
End: 2019-02-12

## 2019-02-12 DIAGNOSIS — M54.2 CERVICALGIA: ICD-10-CM

## 2019-02-12 DIAGNOSIS — G89.29 CHRONIC MIDLINE LOW BACK PAIN WITHOUT SCIATICA: ICD-10-CM

## 2019-02-12 DIAGNOSIS — M54.50 CHRONIC MIDLINE LOW BACK PAIN WITHOUT SCIATICA: ICD-10-CM

## 2019-02-12 DIAGNOSIS — R29.898 DECREASED ROM OF NECK: ICD-10-CM

## 2019-02-12 DIAGNOSIS — R29.3 POOR POSTURE: ICD-10-CM

## 2019-02-12 DIAGNOSIS — M53.86 DECREASED ROM OF LUMBAR SPINE: ICD-10-CM

## 2019-02-12 DIAGNOSIS — M62.81 MUSCLE WEAKNESS (GENERALIZED): ICD-10-CM

## 2019-02-15 ENCOUNTER — COMMUNICATION - HEALTHEAST (OUTPATIENT)
Dept: INTERNAL MEDICINE | Facility: CLINIC | Age: 84
End: 2019-02-15

## 2019-02-15 DIAGNOSIS — K21.9 GERD (GASTROESOPHAGEAL REFLUX DISEASE): ICD-10-CM

## 2019-02-27 ENCOUNTER — AMBULATORY - HEALTHEAST (OUTPATIENT)
Dept: OTOLARYNGOLOGY | Facility: CLINIC | Age: 84
End: 2019-02-27

## 2019-02-27 ENCOUNTER — OFFICE VISIT - HEALTHEAST (OUTPATIENT)
Dept: OTOLARYNGOLOGY | Facility: CLINIC | Age: 84
End: 2019-02-27

## 2019-02-27 ENCOUNTER — OFFICE VISIT - HEALTHEAST (OUTPATIENT)
Dept: AUDIOLOGY | Facility: CLINIC | Age: 84
End: 2019-02-27

## 2019-02-27 DIAGNOSIS — H81.11 BPPV (BENIGN PAROXYSMAL POSITIONAL VERTIGO), RIGHT: ICD-10-CM

## 2019-02-27 DIAGNOSIS — R42 DIZZINESS: ICD-10-CM

## 2019-02-27 DIAGNOSIS — H93.12 TINNITUS OF LEFT EAR: ICD-10-CM

## 2019-02-27 DIAGNOSIS — H92.01 EAR PAIN, RIGHT: ICD-10-CM

## 2019-02-27 DIAGNOSIS — H90.6 MIXED HEARING LOSS, BILATERAL: ICD-10-CM

## 2019-03-05 ENCOUNTER — OFFICE VISIT - HEALTHEAST (OUTPATIENT)
Dept: OCCUPATIONAL THERAPY | Facility: REHABILITATION | Age: 84
End: 2019-03-05

## 2019-03-05 DIAGNOSIS — Z78.9 DECREASED ACTIVITIES OF DAILY LIVING (ADL): ICD-10-CM

## 2019-03-05 DIAGNOSIS — H81.11 BENIGN PAROXYSMAL POSITIONAL VERTIGO OF RIGHT EAR: ICD-10-CM

## 2019-03-05 DIAGNOSIS — R42 VERTIGO: ICD-10-CM

## 2019-03-05 DIAGNOSIS — R26.81 UNSTEADINESS ON FEET: ICD-10-CM

## 2019-03-12 ENCOUNTER — OFFICE VISIT - HEALTHEAST (OUTPATIENT)
Dept: OCCUPATIONAL THERAPY | Facility: REHABILITATION | Age: 84
End: 2019-03-12

## 2019-03-12 DIAGNOSIS — Z78.9 DECREASED ACTIVITIES OF DAILY LIVING (ADL): ICD-10-CM

## 2019-03-12 DIAGNOSIS — H81.12 BENIGN PAROXYSMAL POSITIONAL VERTIGO OF LEFT EAR: ICD-10-CM

## 2019-03-12 DIAGNOSIS — R42 VERTIGO: ICD-10-CM

## 2019-03-12 DIAGNOSIS — R26.81 UNSTEADINESS ON FEET: ICD-10-CM

## 2019-03-15 ENCOUNTER — COMMUNICATION - HEALTHEAST (OUTPATIENT)
Dept: INTERNAL MEDICINE | Facility: CLINIC | Age: 84
End: 2019-03-15

## 2019-03-15 DIAGNOSIS — G47.00 INSOMNIA: ICD-10-CM

## 2019-03-19 ENCOUNTER — OFFICE VISIT - HEALTHEAST (OUTPATIENT)
Dept: OCCUPATIONAL THERAPY | Facility: REHABILITATION | Age: 84
End: 2019-03-19

## 2019-03-19 DIAGNOSIS — H81.12 BENIGN PAROXYSMAL POSITIONAL VERTIGO OF LEFT EAR: ICD-10-CM

## 2019-03-19 DIAGNOSIS — H81.11 BENIGN PAROXYSMAL POSITIONAL VERTIGO OF RIGHT EAR: ICD-10-CM

## 2019-03-19 DIAGNOSIS — Z78.9 DECREASED ACTIVITIES OF DAILY LIVING (ADL): ICD-10-CM

## 2019-03-19 DIAGNOSIS — R26.81 UNSTEADINESS ON FEET: ICD-10-CM

## 2019-03-21 ENCOUNTER — OFFICE VISIT - HEALTHEAST (OUTPATIENT)
Dept: PHYSICAL THERAPY | Facility: REHABILITATION | Age: 84
End: 2019-03-21

## 2019-03-21 DIAGNOSIS — M54.2 CERVICALGIA: ICD-10-CM

## 2019-03-21 DIAGNOSIS — R29.898 DECREASED ROM OF NECK: ICD-10-CM

## 2019-03-21 DIAGNOSIS — G89.29 CHRONIC MIDLINE LOW BACK PAIN WITHOUT SCIATICA: ICD-10-CM

## 2019-03-21 DIAGNOSIS — M62.81 MUSCLE WEAKNESS (GENERALIZED): ICD-10-CM

## 2019-03-21 DIAGNOSIS — R29.3 POOR POSTURE: ICD-10-CM

## 2019-03-21 DIAGNOSIS — M54.50 CHRONIC MIDLINE LOW BACK PAIN WITHOUT SCIATICA: ICD-10-CM

## 2019-03-21 DIAGNOSIS — M53.86 DECREASED ROM OF LUMBAR SPINE: ICD-10-CM

## 2019-03-24 ENCOUNTER — COMMUNICATION - HEALTHEAST (OUTPATIENT)
Dept: INTERNAL MEDICINE | Facility: CLINIC | Age: 84
End: 2019-03-24

## 2019-03-24 DIAGNOSIS — G89.29 CHRONIC MIDLINE LOW BACK PAIN WITHOUT SCIATICA: ICD-10-CM

## 2019-03-24 DIAGNOSIS — M54.2 NECK PAIN: ICD-10-CM

## 2019-03-24 DIAGNOSIS — M54.50 CHRONIC MIDLINE LOW BACK PAIN WITHOUT SCIATICA: ICD-10-CM

## 2019-04-02 ENCOUNTER — OFFICE VISIT - HEALTHEAST (OUTPATIENT)
Dept: PHYSICAL THERAPY | Facility: REHABILITATION | Age: 84
End: 2019-04-02

## 2019-04-02 DIAGNOSIS — G89.29 CHRONIC MIDLINE LOW BACK PAIN WITHOUT SCIATICA: ICD-10-CM

## 2019-04-02 DIAGNOSIS — R29.3 POOR POSTURE: ICD-10-CM

## 2019-04-02 DIAGNOSIS — M62.81 MUSCLE WEAKNESS (GENERALIZED): ICD-10-CM

## 2019-04-02 DIAGNOSIS — R29.898 DECREASED ROM OF NECK: ICD-10-CM

## 2019-04-02 DIAGNOSIS — M54.50 CHRONIC MIDLINE LOW BACK PAIN WITHOUT SCIATICA: ICD-10-CM

## 2019-04-02 DIAGNOSIS — M53.86 DECREASED ROM OF LUMBAR SPINE: ICD-10-CM

## 2019-04-02 DIAGNOSIS — M54.2 CERVICALGIA: ICD-10-CM

## 2019-04-09 ENCOUNTER — OFFICE VISIT - HEALTHEAST (OUTPATIENT)
Dept: PHYSICAL THERAPY | Facility: REHABILITATION | Age: 84
End: 2019-04-09

## 2019-04-09 ENCOUNTER — OFFICE VISIT - HEALTHEAST (OUTPATIENT)
Dept: OCCUPATIONAL THERAPY | Facility: REHABILITATION | Age: 84
End: 2019-04-09

## 2019-04-09 DIAGNOSIS — M62.81 MUSCLE WEAKNESS (GENERALIZED): ICD-10-CM

## 2019-04-09 DIAGNOSIS — G89.29 CHRONIC MIDLINE LOW BACK PAIN WITHOUT SCIATICA: ICD-10-CM

## 2019-04-09 DIAGNOSIS — R42 VERTIGO: ICD-10-CM

## 2019-04-09 DIAGNOSIS — M54.2 CERVICALGIA: ICD-10-CM

## 2019-04-09 DIAGNOSIS — H81.11 BENIGN PAROXYSMAL POSITIONAL VERTIGO OF RIGHT EAR: ICD-10-CM

## 2019-04-09 DIAGNOSIS — M54.50 CHRONIC MIDLINE LOW BACK PAIN WITHOUT SCIATICA: ICD-10-CM

## 2019-04-09 DIAGNOSIS — M53.86 DECREASED ROM OF LUMBAR SPINE: ICD-10-CM

## 2019-04-09 DIAGNOSIS — H81.12 BENIGN PAROXYSMAL POSITIONAL VERTIGO OF LEFT EAR: ICD-10-CM

## 2019-04-09 DIAGNOSIS — R29.3 POOR POSTURE: ICD-10-CM

## 2019-04-09 DIAGNOSIS — Z78.9 DECREASED ACTIVITIES OF DAILY LIVING (ADL): ICD-10-CM

## 2019-04-09 DIAGNOSIS — R26.81 UNSTEADINESS ON FEET: ICD-10-CM

## 2019-04-09 DIAGNOSIS — R29.898 DECREASED ROM OF NECK: ICD-10-CM

## 2019-04-22 ENCOUNTER — COMMUNICATION - HEALTHEAST (OUTPATIENT)
Dept: INTERNAL MEDICINE | Facility: CLINIC | Age: 84
End: 2019-04-22

## 2019-04-22 DIAGNOSIS — M54.2 NECK PAIN: ICD-10-CM

## 2019-04-22 DIAGNOSIS — M54.50 CHRONIC MIDLINE LOW BACK PAIN WITHOUT SCIATICA: ICD-10-CM

## 2019-04-22 DIAGNOSIS — G89.29 CHRONIC MIDLINE LOW BACK PAIN WITHOUT SCIATICA: ICD-10-CM

## 2019-05-06 ENCOUNTER — COMMUNICATION - HEALTHEAST (OUTPATIENT)
Dept: INTERNAL MEDICINE | Facility: CLINIC | Age: 84
End: 2019-05-06

## 2019-05-06 DIAGNOSIS — L30.9 DERMATITIS: ICD-10-CM

## 2019-06-05 ENCOUNTER — COMMUNICATION - HEALTHEAST (OUTPATIENT)
Dept: GERIATRIC MEDICINE | Facility: CLINIC | Age: 84
End: 2019-06-05

## 2019-06-13 ENCOUNTER — COMMUNICATION - HEALTHEAST (OUTPATIENT)
Dept: GERIATRIC MEDICINE | Facility: CLINIC | Age: 84
End: 2019-06-13

## 2019-06-17 ENCOUNTER — COMMUNICATION - HEALTHEAST (OUTPATIENT)
Dept: INTERNAL MEDICINE | Facility: CLINIC | Age: 84
End: 2019-06-17

## 2019-06-17 DIAGNOSIS — G89.29 CHRONIC MIDLINE LOW BACK PAIN WITHOUT SCIATICA: ICD-10-CM

## 2019-06-17 DIAGNOSIS — M54.2 NECK PAIN: ICD-10-CM

## 2019-06-17 DIAGNOSIS — M54.50 CHRONIC MIDLINE LOW BACK PAIN WITHOUT SCIATICA: ICD-10-CM

## 2019-06-19 ENCOUNTER — COMMUNICATION - HEALTHEAST (OUTPATIENT)
Dept: GERIATRIC MEDICINE | Facility: CLINIC | Age: 84
End: 2019-06-19

## 2019-07-26 ENCOUNTER — COMMUNICATION - HEALTHEAST (OUTPATIENT)
Dept: INTERNAL MEDICINE | Facility: CLINIC | Age: 84
End: 2019-07-26

## 2019-07-26 DIAGNOSIS — M81.0 OSTEOPOROSIS: ICD-10-CM

## 2019-07-31 ENCOUNTER — COMMUNICATION - HEALTHEAST (OUTPATIENT)
Dept: INTERNAL MEDICINE | Facility: CLINIC | Age: 84
End: 2019-07-31

## 2019-08-20 ENCOUNTER — COMMUNICATION - HEALTHEAST (OUTPATIENT)
Dept: INTERNAL MEDICINE | Facility: CLINIC | Age: 84
End: 2019-08-20

## 2019-08-20 DIAGNOSIS — K21.9 GERD (GASTROESOPHAGEAL REFLUX DISEASE): ICD-10-CM

## 2019-08-30 ENCOUNTER — OFFICE VISIT - HEALTHEAST (OUTPATIENT)
Dept: INTERNAL MEDICINE | Facility: CLINIC | Age: 84
End: 2019-08-30

## 2019-08-30 DIAGNOSIS — J01.00 ACUTE NON-RECURRENT MAXILLARY SINUSITIS: ICD-10-CM

## 2019-08-30 DIAGNOSIS — R30.0 DYSURIA: ICD-10-CM

## 2019-08-30 DIAGNOSIS — I25.9 CHEST PAIN DUE TO MYOCARDIAL ISCHEMIA, UNSPECIFIED ISCHEMIC CHEST PAIN TYPE: ICD-10-CM

## 2019-08-30 DIAGNOSIS — G44.209 TENSION HEADACHE: ICD-10-CM

## 2019-08-30 DIAGNOSIS — M94.0 COSTOCHONDRITIS: ICD-10-CM

## 2019-08-30 LAB
ALBUMIN SERPL-MCNC: 3.8 G/DL (ref 3.5–5)
ALBUMIN UR-MCNC: NEGATIVE MG/DL
ALP SERPL-CCNC: 77 U/L (ref 45–120)
ALT SERPL W P-5'-P-CCNC: 14 U/L (ref 0–45)
ANION GAP SERPL CALCULATED.3IONS-SCNC: 11 MMOL/L (ref 5–18)
APPEARANCE UR: CLEAR
AST SERPL W P-5'-P-CCNC: 21 U/L (ref 0–40)
ATRIAL RATE - MUSE: 80 BPM
BACTERIA #/AREA URNS HPF: ABNORMAL HPF
BASOPHILS # BLD AUTO: 0 THOU/UL (ref 0–0.2)
BASOPHILS NFR BLD AUTO: 1 % (ref 0–2)
BILIRUB SERPL-MCNC: 0.7 MG/DL (ref 0–1)
BILIRUB UR QL STRIP: NEGATIVE
BUN SERPL-MCNC: 13 MG/DL (ref 8–28)
CALCIUM SERPL-MCNC: 9.3 MG/DL (ref 8.5–10.5)
CHLORIDE BLD-SCNC: 104 MMOL/L (ref 98–107)
CO2 SERPL-SCNC: 27 MMOL/L (ref 22–31)
COLOR UR AUTO: YELLOW
CREAT SERPL-MCNC: 0.75 MG/DL (ref 0.6–1.1)
DIASTOLIC BLOOD PRESSURE - MUSE: NORMAL MMHG
EOSINOPHIL # BLD AUTO: 0.2 THOU/UL (ref 0–0.4)
EOSINOPHIL NFR BLD AUTO: 5 % (ref 0–6)
ERYTHROCYTE [DISTWIDTH] IN BLOOD BY AUTOMATED COUNT: 11.7 % (ref 11–14.5)
ERYTHROCYTE [SEDIMENTATION RATE] IN BLOOD BY WESTERGREN METHOD: 19 MM/HR (ref 0–20)
GFR SERPL CREATININE-BSD FRML MDRD: >60 ML/MIN/1.73M2
GLUCOSE BLD-MCNC: 98 MG/DL (ref 70–125)
GLUCOSE UR STRIP-MCNC: NEGATIVE MG/DL
HCT VFR BLD AUTO: 40.1 % (ref 35–47)
HGB BLD-MCNC: 13.3 G/DL (ref 12–16)
HGB UR QL STRIP: ABNORMAL
INTERPRETATION ECG - MUSE: NORMAL
KETONES UR STRIP-MCNC: NEGATIVE MG/DL
LDLC SERPL CALC-MCNC: 115 MG/DL
LEUKOCYTE ESTERASE UR QL STRIP: NEGATIVE
LYMPHOCYTES # BLD AUTO: 1.8 THOU/UL (ref 0.8–4.4)
LYMPHOCYTES NFR BLD AUTO: 46 % (ref 20–40)
MCH RBC QN AUTO: 30.2 PG (ref 27–34)
MCHC RBC AUTO-ENTMCNC: 33.2 G/DL (ref 32–36)
MCV RBC AUTO: 91 FL (ref 80–100)
MONOCYTES # BLD AUTO: 0.3 THOU/UL (ref 0–0.9)
MONOCYTES NFR BLD AUTO: 9 % (ref 2–10)
NEUTROPHILS # BLD AUTO: 1.5 THOU/UL (ref 2–7.7)
NEUTROPHILS NFR BLD AUTO: 40 % (ref 50–70)
NITRATE UR QL: NEGATIVE
P AXIS - MUSE: 68 DEGREES
PH UR STRIP: 7 [PH] (ref 5–8)
PLATELET # BLD AUTO: 159 THOU/UL (ref 140–440)
PMV BLD AUTO: 7.9 FL (ref 7–10)
POTASSIUM BLD-SCNC: 3.6 MMOL/L (ref 3.5–5)
PR INTERVAL - MUSE: 162 MS
PROT SERPL-MCNC: 7.6 G/DL (ref 6–8)
QRS DURATION - MUSE: 90 MS
QT - MUSE: 390 MS
QTC - MUSE: 449 MS
R AXIS - MUSE: -53 DEGREES
RBC # BLD AUTO: 4.41 MILL/UL (ref 3.8–5.4)
RBC #/AREA URNS AUTO: ABNORMAL HPF
SODIUM SERPL-SCNC: 142 MMOL/L (ref 136–145)
SP GR UR STRIP: 1.01 (ref 1–1.03)
SQUAMOUS #/AREA URNS AUTO: ABNORMAL LPF
SYSTOLIC BLOOD PRESSURE - MUSE: NORMAL MMHG
T AXIS - MUSE: 9 DEGREES
TROPONIN I SERPL-MCNC: <0.01 NG/ML (ref 0–0.29)
TSH SERPL DL<=0.005 MIU/L-ACNC: 3.65 UIU/ML (ref 0.3–5)
UROBILINOGEN UR STRIP-ACNC: ABNORMAL
VENTRICULAR RATE- MUSE: 80 BPM
WBC #/AREA URNS AUTO: ABNORMAL HPF
WBC: 3.8 THOU/UL (ref 4–11)

## 2019-08-30 ASSESSMENT — MIFFLIN-ST. JEOR: SCORE: 1058.18

## 2019-09-02 ENCOUNTER — COMMUNICATION - HEALTHEAST (OUTPATIENT)
Dept: INTERNAL MEDICINE | Facility: CLINIC | Age: 84
End: 2019-09-02

## 2019-11-01 ENCOUNTER — COMMUNICATION - HEALTHEAST (OUTPATIENT)
Dept: INTERNAL MEDICINE | Facility: CLINIC | Age: 84
End: 2019-11-01

## 2019-11-01 DIAGNOSIS — I10 ESSENTIAL HYPERTENSION: ICD-10-CM

## 2019-11-16 ENCOUNTER — COMMUNICATION - HEALTHEAST (OUTPATIENT)
Dept: INTERNAL MEDICINE | Facility: CLINIC | Age: 84
End: 2019-11-16

## 2019-11-16 DIAGNOSIS — K21.9 GERD (GASTROESOPHAGEAL REFLUX DISEASE): ICD-10-CM

## 2019-11-26 ENCOUNTER — OFFICE VISIT - HEALTHEAST (OUTPATIENT)
Dept: INTERNAL MEDICINE | Facility: CLINIC | Age: 84
End: 2019-11-26

## 2019-11-26 DIAGNOSIS — G47.00 INSOMNIA: ICD-10-CM

## 2019-11-26 DIAGNOSIS — Z23 FLU VACCINE NEED: ICD-10-CM

## 2019-11-26 DIAGNOSIS — E55.9 VITAMIN D DEFICIENCY: ICD-10-CM

## 2019-11-26 DIAGNOSIS — M81.0 OSTEOPOROSIS: ICD-10-CM

## 2019-11-26 DIAGNOSIS — L30.9 DERMATITIS: ICD-10-CM

## 2019-11-26 DIAGNOSIS — R22.0 SWELLING OF FACE: ICD-10-CM

## 2019-11-26 DIAGNOSIS — R07.89 CHEST WALL PAIN: ICD-10-CM

## 2019-11-26 LAB
ANION GAP SERPL CALCULATED.3IONS-SCNC: 9 MMOL/L (ref 5–18)
BUN SERPL-MCNC: 11 MG/DL (ref 8–28)
CALCIUM SERPL-MCNC: 9.3 MG/DL (ref 8.5–10.5)
CHLORIDE BLD-SCNC: 104 MMOL/L (ref 98–107)
CO2 SERPL-SCNC: 27 MMOL/L (ref 22–31)
CREAT SERPL-MCNC: 0.75 MG/DL (ref 0.6–1.1)
ERYTHROCYTE [DISTWIDTH] IN BLOOD BY AUTOMATED COUNT: 12.5 % (ref 11–14.5)
GFR SERPL CREATININE-BSD FRML MDRD: >60 ML/MIN/1.73M2
GLUCOSE BLD-MCNC: 102 MG/DL (ref 70–125)
HCT VFR BLD AUTO: 39.3 % (ref 35–47)
HGB BLD-MCNC: 13.2 G/DL (ref 12–16)
MCH RBC QN AUTO: 30 PG (ref 27–34)
MCHC RBC AUTO-ENTMCNC: 33.5 G/DL (ref 32–36)
MCV RBC AUTO: 90 FL (ref 80–100)
PLATELET # BLD AUTO: 140 THOU/UL (ref 140–440)
PMV BLD AUTO: 8.6 FL (ref 7–10)
POTASSIUM BLD-SCNC: 3.7 MMOL/L (ref 3.5–5)
RBC # BLD AUTO: 4.38 MILL/UL (ref 3.8–5.4)
SODIUM SERPL-SCNC: 140 MMOL/L (ref 136–145)
WBC: 3.9 THOU/UL (ref 4–11)

## 2019-11-26 ASSESSMENT — MIFFLIN-ST. JEOR: SCORE: 1063.85

## 2019-11-27 LAB — 25(OH)D3 SERPL-MCNC: 43.2 NG/ML (ref 30–80)

## 2019-12-01 ENCOUNTER — COMMUNICATION - HEALTHEAST (OUTPATIENT)
Dept: INTERNAL MEDICINE | Facility: CLINIC | Age: 84
End: 2019-12-01

## 2019-12-23 ENCOUNTER — OFFICE VISIT - HEALTHEAST (OUTPATIENT)
Dept: INTERNAL MEDICINE | Facility: CLINIC | Age: 84
End: 2019-12-23

## 2019-12-23 DIAGNOSIS — I10 ESSENTIAL HYPERTENSION: ICD-10-CM

## 2019-12-23 DIAGNOSIS — R07.9 CHEST PAIN, UNSPECIFIED TYPE: ICD-10-CM

## 2019-12-23 DIAGNOSIS — Z23 NEED FOR VACCINATION: ICD-10-CM

## 2019-12-23 ASSESSMENT — MIFFLIN-ST. JEOR: SCORE: 1035.5

## 2020-01-06 ENCOUNTER — COMMUNICATION - HEALTHEAST (OUTPATIENT)
Dept: INTERNAL MEDICINE | Facility: CLINIC | Age: 85
End: 2020-01-06

## 2020-01-06 DIAGNOSIS — I10 ESSENTIAL HYPERTENSION: ICD-10-CM

## 2020-01-07 ENCOUNTER — AMBULATORY - HEALTHEAST (OUTPATIENT)
Dept: LAB | Facility: CLINIC | Age: 85
End: 2020-01-07

## 2020-01-07 ENCOUNTER — COMMUNICATION - HEALTHEAST (OUTPATIENT)
Dept: INTERNAL MEDICINE | Facility: CLINIC | Age: 85
End: 2020-01-07

## 2020-01-07 DIAGNOSIS — I10 ESSENTIAL HYPERTENSION: ICD-10-CM

## 2020-01-07 DIAGNOSIS — R07.9 CHEST PAIN: ICD-10-CM

## 2020-01-07 LAB
ANION GAP SERPL CALCULATED.3IONS-SCNC: 9 MMOL/L (ref 5–18)
BUN SERPL-MCNC: 9 MG/DL (ref 8–28)
CALCIUM SERPL-MCNC: 9.6 MG/DL (ref 8.5–10.5)
CHLORIDE BLD-SCNC: 101 MMOL/L (ref 98–107)
CO2 SERPL-SCNC: 31 MMOL/L (ref 22–31)
CREAT SERPL-MCNC: 0.75 MG/DL (ref 0.6–1.1)
GFR SERPL CREATININE-BSD FRML MDRD: >60 ML/MIN/1.73M2
GLUCOSE BLD-MCNC: 119 MG/DL (ref 70–125)
POTASSIUM BLD-SCNC: 3.9 MMOL/L (ref 3.5–5)
SODIUM SERPL-SCNC: 141 MMOL/L (ref 136–145)

## 2020-02-26 ENCOUNTER — COMMUNICATION - HEALTHEAST (OUTPATIENT)
Dept: GERIATRIC MEDICINE | Facility: CLINIC | Age: 85
End: 2020-02-26

## 2020-05-08 ENCOUNTER — COMMUNICATION - HEALTHEAST (OUTPATIENT)
Dept: GERIATRIC MEDICINE | Facility: CLINIC | Age: 85
End: 2020-05-08

## 2020-05-08 ASSESSMENT — ACTIVITIES OF DAILY LIVING (ADL)
DEPENDENT_IADLS:: CLEANING;COOKING;LAUNDRY;SHOPPING;MEAL PREPARATION;TRANSPORTATION;MEDICATION MANAGEMENT;MONEY MANAGEMENT

## 2020-05-13 ENCOUNTER — COMMUNICATION - HEALTHEAST (OUTPATIENT)
Dept: GERIATRIC MEDICINE | Facility: CLINIC | Age: 85
End: 2020-05-13

## 2020-09-07 ENCOUNTER — COMMUNICATION - HEALTHEAST (OUTPATIENT)
Dept: INTERNAL MEDICINE | Facility: CLINIC | Age: 85
End: 2020-09-07

## 2020-09-07 DIAGNOSIS — R07.9 CHEST PAIN: ICD-10-CM

## 2020-09-10 ENCOUNTER — COMMUNICATION - HEALTHEAST (OUTPATIENT)
Dept: INTERNAL MEDICINE | Facility: CLINIC | Age: 85
End: 2020-09-10

## 2020-09-14 ENCOUNTER — COMMUNICATION - HEALTHEAST (OUTPATIENT)
Dept: INTERNAL MEDICINE | Facility: CLINIC | Age: 85
End: 2020-09-14

## 2020-09-14 ENCOUNTER — OFFICE VISIT - HEALTHEAST (OUTPATIENT)
Dept: INTERNAL MEDICINE | Facility: CLINIC | Age: 85
End: 2020-09-14

## 2020-09-14 DIAGNOSIS — I10 ESSENTIAL HYPERTENSION: ICD-10-CM

## 2020-09-14 DIAGNOSIS — J30.2 SEASONAL ALLERGIC RHINITIS, UNSPECIFIED TRIGGER: ICD-10-CM

## 2020-09-14 DIAGNOSIS — H57.9 ITCHY EYES: ICD-10-CM

## 2020-09-14 DIAGNOSIS — Z78.0 POST-MENOPAUSAL: ICD-10-CM

## 2020-09-14 DIAGNOSIS — Z23 NEED FOR VACCINATION: ICD-10-CM

## 2020-09-14 DIAGNOSIS — E55.9 VITAMIN D DEFICIENCY: ICD-10-CM

## 2020-09-14 DIAGNOSIS — L30.9 DERMATITIS: ICD-10-CM

## 2020-09-14 DIAGNOSIS — M81.0 OSTEOPOROSIS: ICD-10-CM

## 2020-09-14 DIAGNOSIS — E78.5 HYPERLIPIDEMIA LDL GOAL <130: ICD-10-CM

## 2020-09-29 ENCOUNTER — AMBULATORY - HEALTHEAST (OUTPATIENT)
Dept: LAB | Facility: CLINIC | Age: 85
End: 2020-09-29

## 2020-09-29 ENCOUNTER — AMBULATORY - HEALTHEAST (OUTPATIENT)
Dept: NURSING | Facility: CLINIC | Age: 85
End: 2020-09-29

## 2020-09-29 DIAGNOSIS — E78.5 HYPERLIPIDEMIA LDL GOAL <130: ICD-10-CM

## 2020-09-29 DIAGNOSIS — E55.9 VITAMIN D DEFICIENCY: ICD-10-CM

## 2020-09-29 DIAGNOSIS — I10 ESSENTIAL HYPERTENSION: ICD-10-CM

## 2020-09-29 DIAGNOSIS — Z23 NEED FOR VACCINATION: ICD-10-CM

## 2020-09-29 DIAGNOSIS — L30.9 DERMATITIS: ICD-10-CM

## 2020-09-29 LAB
ALBUMIN SERPL-MCNC: 4 G/DL (ref 3.5–5)
ALP SERPL-CCNC: 79 U/L (ref 45–120)
ALT SERPL W P-5'-P-CCNC: 14 U/L (ref 0–45)
ANION GAP SERPL CALCULATED.3IONS-SCNC: 12 MMOL/L (ref 5–18)
AST SERPL W P-5'-P-CCNC: 21 U/L (ref 0–40)
BASOPHILS # BLD AUTO: 0 THOU/UL (ref 0–0.2)
BASOPHILS NFR BLD AUTO: 1 % (ref 0–2)
BILIRUB SERPL-MCNC: 0.5 MG/DL (ref 0–1)
BUN SERPL-MCNC: 15 MG/DL (ref 8–28)
CALCIUM SERPL-MCNC: 9.5 MG/DL (ref 8.5–10.5)
CHLORIDE BLD-SCNC: 104 MMOL/L (ref 98–107)
CHOLEST SERPL-MCNC: 172 MG/DL
CO2 SERPL-SCNC: 25 MMOL/L (ref 22–31)
CREAT SERPL-MCNC: 0.96 MG/DL (ref 0.6–1.1)
EOSINOPHIL # BLD AUTO: 0.2 THOU/UL (ref 0–0.4)
EOSINOPHIL NFR BLD AUTO: 5 % (ref 0–6)
ERYTHROCYTE [DISTWIDTH] IN BLOOD BY AUTOMATED COUNT: 12.6 % (ref 11–14.5)
FASTING STATUS PATIENT QL REPORTED: NORMAL
GFR SERPL CREATININE-BSD FRML MDRD: 55 ML/MIN/1.73M2
GLUCOSE BLD-MCNC: 168 MG/DL (ref 70–125)
HCT VFR BLD AUTO: 40.8 % (ref 35–47)
HDLC SERPL-MCNC: 61 MG/DL
HGB BLD-MCNC: 13.6 G/DL (ref 12–16)
LDLC SERPL CALC-MCNC: 98 MG/DL
LYMPHOCYTES # BLD AUTO: 1.8 THOU/UL (ref 0.8–4.4)
LYMPHOCYTES NFR BLD AUTO: 41 % (ref 20–40)
MCH RBC QN AUTO: 30.1 PG (ref 27–34)
MCHC RBC AUTO-ENTMCNC: 33.3 G/DL (ref 32–36)
MCV RBC AUTO: 91 FL (ref 80–100)
MONOCYTES # BLD AUTO: 0.3 THOU/UL (ref 0–0.9)
MONOCYTES NFR BLD AUTO: 6 % (ref 2–10)
NEUTROPHILS # BLD AUTO: 2.2 THOU/UL (ref 2–7.7)
NEUTROPHILS NFR BLD AUTO: 48 % (ref 50–70)
PLATELET # BLD AUTO: 157 THOU/UL (ref 140–440)
PMV BLD AUTO: 9 FL (ref 7–10)
POTASSIUM BLD-SCNC: 3.6 MMOL/L (ref 3.5–5)
PROT SERPL-MCNC: 7.5 G/DL (ref 6–8)
RBC # BLD AUTO: 4.51 MILL/UL (ref 3.8–5.4)
SODIUM SERPL-SCNC: 141 MMOL/L (ref 136–145)
TRIGL SERPL-MCNC: 66 MG/DL
WBC: 4.5 THOU/UL (ref 4–11)

## 2020-09-30 ENCOUNTER — COMMUNICATION - HEALTHEAST (OUTPATIENT)
Dept: INTERNAL MEDICINE | Facility: CLINIC | Age: 85
End: 2020-09-30

## 2020-09-30 DIAGNOSIS — R73.09 ABNORMAL GLUCOSE: ICD-10-CM

## 2020-09-30 LAB — 25(OH)D3 SERPL-MCNC: 42.3 NG/ML (ref 30–80)

## 2020-10-06 ENCOUNTER — COMMUNICATION - HEALTHEAST (OUTPATIENT)
Dept: INTERNAL MEDICINE | Facility: CLINIC | Age: 85
End: 2020-10-06

## 2020-10-06 DIAGNOSIS — L30.9 DERMATITIS: ICD-10-CM

## 2020-10-30 ENCOUNTER — COMMUNICATION - HEALTHEAST (OUTPATIENT)
Dept: INTERNAL MEDICINE | Facility: CLINIC | Age: 85
End: 2020-10-30

## 2020-10-30 DIAGNOSIS — I10 ESSENTIAL HYPERTENSION: ICD-10-CM

## 2021-01-14 ENCOUNTER — RECORDS - HEALTHEAST (OUTPATIENT)
Dept: ADMINISTRATIVE | Facility: OTHER | Age: 86
End: 2021-01-14

## 2021-01-14 ENCOUNTER — RECORDS - HEALTHEAST (OUTPATIENT)
Dept: BONE DENSITY | Facility: CLINIC | Age: 86
End: 2021-01-14

## 2021-01-14 DIAGNOSIS — Z78.0 ASYMPTOMATIC MENOPAUSAL STATE: ICD-10-CM

## 2021-01-14 DIAGNOSIS — M81.0 AGE-RELATED OSTEOPOROSIS WITHOUT CURRENT PATHOLOGICAL FRACTURE: ICD-10-CM

## 2021-01-19 ENCOUNTER — COMMUNICATION - HEALTHEAST (OUTPATIENT)
Dept: INTERNAL MEDICINE | Facility: CLINIC | Age: 86
End: 2021-01-19

## 2021-01-19 DIAGNOSIS — M81.0 SENILE OSTEOPOROSIS: ICD-10-CM

## 2021-01-28 ENCOUNTER — COMMUNICATION - HEALTHEAST (OUTPATIENT)
Dept: GERIATRIC MEDICINE | Facility: CLINIC | Age: 86
End: 2021-01-28

## 2021-02-01 ENCOUNTER — COMMUNICATION - HEALTHEAST (OUTPATIENT)
Dept: INTERNAL MEDICINE | Facility: CLINIC | Age: 86
End: 2021-02-01

## 2021-02-01 DIAGNOSIS — K21.9 GERD (GASTROESOPHAGEAL REFLUX DISEASE): ICD-10-CM

## 2021-02-28 ENCOUNTER — IMMUNIZATION (OUTPATIENT)
Dept: NURSING | Facility: CLINIC | Age: 86
End: 2021-02-28
Payer: COMMERCIAL

## 2021-02-28 PROCEDURE — 0011A PR COVID VAC MODERNA 100 MCG/0.5 ML IM: CPT

## 2021-02-28 PROCEDURE — 91301 PR COVID VAC MODERNA 100 MCG/0.5 ML IM: CPT

## 2021-03-28 ENCOUNTER — IMMUNIZATION (OUTPATIENT)
Dept: NURSING | Facility: CLINIC | Age: 86
End: 2021-03-28
Payer: COMMERCIAL

## 2021-03-28 PROCEDURE — 0012A PR COVID VAC MODERNA 100 MCG/0.5 ML IM: CPT

## 2021-03-28 PROCEDURE — 91301 PR COVID VAC MODERNA 100 MCG/0.5 ML IM: CPT

## 2021-04-02 ENCOUNTER — COMMUNICATION - HEALTHEAST (OUTPATIENT)
Dept: GERIATRIC MEDICINE | Facility: CLINIC | Age: 86
End: 2021-04-02

## 2021-04-06 ENCOUNTER — COMMUNICATION - HEALTHEAST (OUTPATIENT)
Dept: GERIATRIC MEDICINE | Facility: CLINIC | Age: 86
End: 2021-04-06

## 2021-04-06 ASSESSMENT — ACTIVITIES OF DAILY LIVING (ADL)
DEPENDENT_IADLS:: CLEANING;COOKING;LAUNDRY;SHOPPING;MEAL PREPARATION;MEDICATION MANAGEMENT;MONEY MANAGEMENT;TRANSPORTATION

## 2021-04-12 ENCOUNTER — COMMUNICATION - HEALTHEAST (OUTPATIENT)
Dept: INTERNAL MEDICINE | Facility: CLINIC | Age: 86
End: 2021-04-12

## 2021-04-15 ENCOUNTER — COMMUNICATION - HEALTHEAST (OUTPATIENT)
Dept: GERIATRIC MEDICINE | Facility: CLINIC | Age: 86
End: 2021-04-15

## 2021-05-16 ENCOUNTER — COMMUNICATION - HEALTHEAST (OUTPATIENT)
Dept: INTERNAL MEDICINE | Facility: CLINIC | Age: 86
End: 2021-05-16

## 2021-05-16 DIAGNOSIS — M81.0 OSTEOPOROSIS: ICD-10-CM

## 2021-05-26 ENCOUNTER — COMMUNICATION - HEALTHEAST (OUTPATIENT)
Dept: GERIATRIC MEDICINE | Facility: CLINIC | Age: 86
End: 2021-05-26

## 2021-05-27 VITALS — SYSTOLIC BLOOD PRESSURE: 112 MMHG | DIASTOLIC BLOOD PRESSURE: 64 MMHG

## 2021-05-27 NOTE — PROGRESS NOTES
[facility] Discharge Summary  Patient Name: Radha Mcmanus  Date: 5/17/2019  Referral Diagnosis: Chronic LBP w/o Sciatica/Neck Pain  Referring provider: Anjelica Kapadia MD  Visit Diagnosis:   1. Cervicalgia     2. Chronic midline low back pain without sciatica     3. Decreased ROM of lumbar spine     4. Decreased ROM of neck     5. Poor posture     6. Muscle weakness (generalized)         Goal Status:    Pt. will demonstrate/verbalize independence in self-management of condition in : Comment-not met  Pt. will be independent with home exercise program in : 6 weeks-unmet  Pt. will report decreased intensity, frequency of : Headache;in 6 weeks;Comment-unmet daily HA worse at sleep and upon walking in the morning  Comment:: decrease frequency to 1-2/week  Pt. will have improved quality of sleep: with less pain;waking less times/night;in 6 weeks;Comment-sleep interruptions 2x/night and unable to lay on right side due to dizziness  Comment:: tolerate right SL for sleep    Patient will decrease : BJ score;NDI score;in 6 weeks;by _ points;Comment  by ___ points: 5  Comment: NDI Initial 42.225 Current 87.5%; BJ Initial 32% Current 38% Unmet  Pt will: be able to pray with forehead on floor with less neck and LBP in 6 weeks -unmet-prays in the chair    Patient was seen for 4 visits from 2/12/2019 to 4/9/2019 with 1 cancelled appointment due to injury from fall.  The patient attended therapy initially, but did not finish the therapy sessions prescribed.  Goals were not fully achieved. Explanation for goals not achieved: protracted treatment episode as patient did not call to schedule additional visits and did not schedule number of appointments as recommended. with difficulty performig exercises correctly without cues.  The patient was instructed to follow up with physician's clinic.  Patient received a home program postural/scapular/core strengthening, shoulder ROM, back stretches.  Poor tolerance for supine  position due to vertigo  The patient discontinued therapy, did not return.  The patient was issued theraband and instructed in proper usage.    Therapy will be discontinued at this time.  The patient will need a new referral to resume.    Thank you for your referral.  Brianna Romero  5/17/2019  4:25 PM      [facility] Daily Progress     Patient Name: Radha Mcmanus  PRECAUTIONS/RESTRICTIONS:  Latent TB, Osteoporosis, Dementia, Chronic Pain Syndrome, Vertigo  Date: 4/9/2019  Visit #: 4  PTA visit #:  0  Referral Diagnosis: Chronic LBP w/o Sciatica/Neck Pain  Referring provider: Anjelica Kapadia MD  Visit Diagnosis:     ICD-10-CM    1. Cervicalgia M54.2    2. Chronic midline low back pain without sciatica M54.5     G89.29    3. Decreased ROM of lumbar spine M53.86    4. Decreased ROM of neck R29.898    5. Poor posture R29.3    6. Muscle weakness (generalized) M62.81          Assessment:     HEP/POC compliance is  fair peforming row with benefit.  Patient demonstrates understanding/independence with home program.  Response to Intervention fair, less pain  Patient is appropriate to continue with skilled physical therapy intervention, as indicated by initial plan of care.  Patient is moving with better posture/ and greater ease in movement.    Goal Status:  Ongoing  Pt. will demonstrate/verbalize independence in self-management of condition in : Comment-not met  Pt. will be independent with home exercise program in : 6 weeks-unmet  Pt. will report decreased intensity, frequency of : Headache;in 6 weeks;Comment-unmet daily HA worse at sleep and upon walking in the morning  Comment:: decrease frequency to 1-2/week  Pt. will have improved quality of sleep: with less pain;waking less times/night;in 6 weeks;Comment-sleep interruptions 2x/night and unable to lay on right side due to dizziness  Comment:: tolerate right SL for sleep    Patient will decrease : BJ score;NDI score;in 6 weeks;by _ points;Comment  by  ___ points: 5  Comment: NDI Initial 42.225 Current 87.5%; BJ Initial 32% Current 38%  Pt will: be able to pray with forehead on floor with less neck and LBP in 6 weeks -unmet-prays in the chair      Plan / Patient Education:     Continue with initial plan of care.  Progress with home program as tolerated.   Plan on seeing 1x/week for 1 more visit then re assess needs/likely make up appointment that she missed last week due to injury from fall.  Continue with manual therapy, MFR to L-S and UT, continue  US to neck, review previously instructed exercises, add neck rot/nodding, cerv/scap retraction, add hooklying trunk rotation,supine morning stretch, supine core bent knee lift, pull down, UBE as able although poor tolerance for supine positioning due to dizziness.      Subjective:     Pain Rating: neck 5/10; LBP 5/10 and does not have a HA (5/10 at initial visit)  C/o bilat neck/shoulder paininterscapular region, and reporting left anterior rib region under breast and less in LB.  Reports to have fallen 3/25/2019 falling on right side striking right face with subsequent swelling at right zygomatic arch. States she fell because she became dizzy.  Injured LB as well.  Was outside at adult  and became dizzy when she fell..  Continues swelling on left zygomatic arch.  Receiving treatment for dizziness but treatment isn't helpful so will not continue OT and will return to me.  Compliant with row exercise only  Continues to perform prayers in seated position.  Response to PT/benefits: general mobility and bed transfers     Continued difficulty sleep at right SL with 1 interruption, HA 3x/week, pray position with increased HA and LBP:      Outcome Measures:  NDI Initial 42.225 Current 87.5%     BJ Initial 32% Current 38%    Objective:     Neck pain is worse right>left.  Palpation:  Right>left neck/UT/interscapular, left anterior rib inferior to breast> lumbar paraspinals    Shoulder/Elbow ROM           Date:  2/12/2019    3/21/2019      Shoulder and Elbow ROM ( )    AROM in degrees AROM in degrees AROM in degrees    Right Left Right Left Right Left   Shoulder Flexion (0-180 ) 125-130 pain    125 shoulder pain 125 shoulder pain       Shoulder Abduction (0-180 )      90 shoulder pain  90 shoulder pain       Shoulder Extension (0-60 ) 35-40 pain    40 shoulder pain  40 shoulder pain       Shoulder ER (0-90 ) Unable to touch back of neck pain T1  70 right shoulder pain 70 less left shoulder pain        Shoulder IR (0-70 ) L3 pain L3 L2 shoulder pain  L2-3 shoulder pain       Shoulder IR/EXT               Elbow Flexion (150 )               Elbow Extension (0 )                 PROM in degrees PROM in degrees PROM in degrees     Right Left Right Left Right Left   Shoulder Flexion (0-180 )               Shoulder Abduction (0-180 ) 150 pain             Shoulder Extension (0-60 )               Shoulder ER (0-90 )               Shoulder IR (0-70 )               Elbow Flexion (150 )               Elbow Extension (0 )                        Cervical ROM           Date: 2/12/2019    3/21/2019      *Indicate scale AROM AROM AROM   Cervical Flexion Min loss, bilat neck pain Min loss, bilat neck pain      Cervical Extension NT NT        Right Left Right Left Right Left   Cervical Sidebending Nil-min loss, NE Min-nil loss, left neck pain  Mod loss, pain  Min loss, Pain       Cervical Rotation Mod loss, tight right neck Min loss, left neck pain MOd loss, pain  Min loss, pain        Cervical Protraction         Cervical Retraction         Thoracic Flexion         Thoracic Extension         Thoracic Sidebending               Thoracic Rotation                     Lumbar ROM:             Date: 2/12/2019    3/21/2019      *Indicate scale AROM AROM AROM   Lumbar Flexion Seated, to mid legs, pain pressure Seated to ankles with increased HA pain and LBP      Lumbar Extension Mod-severe loss, LBP         Right Left Right Left Right Left   Lumbar  Sidebending Mod -severe loss, LBP Mod-severe loss, LBP           Lumbar Rotation Mod loss,neck pain Mod loss, neck pain Mod loss,  Upper back pain and LBP   Mod loss with greater upper back and LBP       Thoracic Flexion         Thoracic Extension         Thoracic Sidebending               Thoracic Rotation                     Today's Exercises:  Reported fatigue after 1.5 minutes of UBE.  LBP when performing pull downs with YTB at standing and sitting.    Treatment Today    TREATMENT MINUTES COMMENTS   Evaluation     Self-care/ Home management     Manual therapy 20 Seated:  STM neck and shoulders; left SL with STM to interscapular region and LS/glut   Neuromuscular Re-education     Therapeutic Activity     Therapeutic Exercises 10 ROM neck/shoulder/lumbar and verbal review of previously instructed exercises.   Gait training     Modality___US bilat neck and UT seated_______________ 10+3 set up 1 MHz, 100%, 1.0 w/c2              Total 48    Blank areas are intentional and mean the treatment did not include these items.       Brianna Romero  4/9/2019

## 2021-05-27 NOTE — TELEPHONE ENCOUNTER
RN cannot approve Refill Request    RN can NOT refill this medication med is not covered by policy/route to provider.     Last office visit: 1/23/2019 Anjelica Kapadia MD Last Physical: Visit date not found Last MTM visit: Visit date not found Last visit same specialty: 1/23/2019 Anjelica Kapadia MD.  Next visit within 3 mo: Visit date not found  Next physical within 3 mo: Visit date not found      Angella Baker, [facility] Triage/Med Refill 3/24/2019    Requested Prescriptions   Pending Prescriptions Disp Refills     tiZANidine (ZANAFLEX) 2 MG tablet [Pharmacy Med Name: TIZANIDINE HCL 2 MG TABLET] 60 tablet 0     Sig: TAKE 1-2 TABS BY MOUTH AT BEDTIME AS NEEDED FOR NECK AND BACK PAIN    There is no refill protocol information for this order

## 2021-05-27 NOTE — PROGRESS NOTES
[facility] Daily Progress     Patient Name: Radha Mcmanus  PRECAUTIONS/RESTRICTIONS:  Latent TB, Osteoporosis, Dementia, Chronic Pain Syndrome, Vertigo  Date: 4/2/2019  Visit #: 3  PTA visit #:  0  Referral Diagnosis: Chronic LBP w/o Sciatica/Neck Pain  Referring provider: Anjelica Kapadia MD  Visit Diagnosis:     ICD-10-CM    1. Cervicalgia M54.2    2. Chronic midline low back pain without sciatica M54.5     G89.29    3. Decreased ROM of lumbar spine M53.86    4. Decreased ROM of neck R29.898    5. Poor posture R29.3    6. Muscle weakness (generalized) M62.81          Assessment:     HEP/POC compliance is  poor with only partial compliance.  Response to Intervention limited as she has come infrequently.  Patient is appropriate to continue with skilled physical therapy intervention, as indicated by initial plan of care.    Goal Status:  Ongoing  Pt. will demonstrate/verbalize independence in self-management of condition in : Comment-no met  Pt. will be independent with home exercise program in : 6 weeks-unmet  Pt. will report decreased intensity, frequency of : Headache;in 6 weeks;Comment-unmet daily HA worse at sleep and upon walking in the morning  Comment:: decrease frequency to 1-2/week  Pt. will have improved quality of sleep: with less pain;waking less times/night;in 6 weeks;Comment-sleep interruptions 2x/night and unable to lay on right side due to dizziness  Comment:: tolerate right SL for sleep    Patient will decrease : BJ score;NDI score;in 6 weeks;by _ points;Comment  by ___ points: 5  Comment: NDI Initial 42.225 Current 87.5%; BJ Initial 32% Current 38%  Pt will: be able to pray with forehead on floor with less neck and LBP in 6 weeks -unmet-prays in the chair      Plan / Patient Education:     Continue with initial plan of care.  Progress with home program as tolerated.   Plan on seeing 1x/week for 1 more visit then re assess needs/likely make up appointment that she missed last week due  to injury from fall.  Continue with manual therapy, MFR to L-S and UT, assess response to  US to neck, review previously instructed exercises, add neck rot/nodding, cerv/scap retraction, add hooklying trunk rotation,supine morning stretch, supine core bent knee lift, pull down, UBE,   .    Subjective:     Pain Rating: neck pain 10/10; LBP 9/10 and has HA   C/o bilat neck/shoulder pain especially interscapular region, and less in LB.  Reports to have fallen 3/25/2019 falling on right side striking right face with subsequent swelling at right zygomatic arch. States she fell because she became dizzy.  Injured LB as well.  Was outside at adult  and became dizzy when she fell..  Has HA today and is present on the Left side.  Receiving treatment for dizziness but treatment isn't helpful   Has not been compliant with initial exercises shown for dizziness and infrequently when performing PT exercises.  Indicating pain in left anterior thigh and leg pain, new since initial visit.  Response to PT/benefits:  None     Continued difficulty sleep at right SL with 1 interruption, HA 3x/week, pray position with increased HA and LBP:      Outcome Measures:  NDI Initial 42.225 Current 87.5%     BJ Initial 32% Current 38%    Objective:     Neck pain is worse right>left.  Palpation:  Right>left neck/UT/interscapular> lumbar paraspinals    Shoulder/Elbow ROM           Date: 2/12/2019    3/21/2019      Shoulder and Elbow ROM ( )    AROM in degrees AROM in degrees AROM in degrees    Right Left Right Left Right Left   Shoulder Flexion (0-180 ) 125-130 pain    125 shoulder pain 125 shoulder pain       Shoulder Abduction (0-180 )      90 shoulder pain  90 shoulder pain       Shoulder Extension (0-60 ) 35-40 pain    40 shoulder pain  40 shoulder pain       Shoulder ER (0-90 ) Unable to touch back of neck pain T1  70 right shoulder pain 70 less left shoulder pain        Shoulder IR (0-70 ) L3 pain L3 L2 shoulder pain  L2-3 shoulder  pain       Shoulder IR/EXT               Elbow Flexion (150 )               Elbow Extension (0 )                 PROM in degrees PROM in degrees PROM in degrees     Right Left Right Left Right Left   Shoulder Flexion (0-180 )               Shoulder Abduction (0-180 ) 150 pain             Shoulder Extension (0-60 )               Shoulder ER (0-90 )               Shoulder IR (0-70 )               Elbow Flexion (150 )               Elbow Extension (0 )                        Cervical ROM           Date: 2/12/2019    3/21/2019      *Indicate scale AROM AROM AROM   Cervical Flexion Min loss, bilat neck pain Min loss, bilat neck pain      Cervical Extension NT NT        Right Left Right Left Right Left   Cervical Sidebending Nil-min loss, NE Min-nil loss, left neck pain  Mod loss, pain  Min loss, Pain       Cervical Rotation Mod loss, tight right neck Min loss, left neck pain MOd loss, pain  Min loss, pain        Cervical Protraction         Cervical Retraction         Thoracic Flexion         Thoracic Extension         Thoracic Sidebending               Thoracic Rotation                     Lumbar ROM:             Date: 2/12/2019    3/21/2019      *Indicate scale AROM AROM AROM   Lumbar Flexion Seated, to mid legs, pain pressure Seated to ankles with increased HA pain and LBP      Lumbar Extension Mod-severe loss, LBP         Right Left Right Left Right Left   Lumbar Sidebending Mod -severe loss, LBP Mod-severe loss, LBP           Lumbar Rotation Mod loss,neck pain Mod loss, neck pain Mod loss,  Upper back pain and LBP   Mod loss with greater upper back and LBP       Thoracic Flexion         Thoracic Extension         Thoracic Sidebending               Thoracic Rotation                     Today's Exercises:  Added row with YTB to HEP with good tolerance.  Reporting less pain after session and appeared to move with greater ease and up right posture following PT        Treatment Today    TREATMENT MINUTES COMMENTS    Evaluation     Self-care/ Home management     Manual therapy 25 Seated:  STM neck and shoulders; left SL with STM to interscapular region and LS/glut   Neuromuscular Re-education     Therapeutic Activity     Therapeutic Exercises 5 ROM neck/shoulder/lumbar and verbal review of previously instructed exercises.   Gait training     Modality___US bilat neck and UT seated_______________ 10+3 set up 1 MHz, 100%, 1.0 w/c2              Total 43    Blank areas are intentional and mean the treatment did not include these items.       Brianna Romero  4/2/2019

## 2021-05-27 NOTE — PROGRESS NOTES
Discharge Summary  Patient Name: Radha Mcmanus  Date: 3/21/2020  Referral Diagnosis: BPPV  Referring provider: Anjelica Kapadia MD  Visit Diagnosis:   1. Benign paroxysmal positional vertigo of left ear     2. Benign paroxysmal positional vertigo of right ear     3. Unsteadiness on feet     4. Decreased activities of daily living (ADL)     5. Vertigo         Goal status: partially met    Patient was seen for 5 visits between 19 and 19.    Therapy will be discontinued at this time.  The patient will need a new referral to resume.    Thank you for your referral.  Karen Ny  3/21/2020   11:50 AM    [facility] Daily Progress     Patient Name: Radha Mcmanus  Date: 2019  Visit #: 5  Referral Diagnosis: BPPV  Referring provider: Anjelica Kapadia MD  Visit Diagnosis:     ICD-10-CM    1. Benign paroxysmal positional vertigo of left ear H81.12    2. Benign paroxysmal positional vertigo of right ear H81.11    3. Unsteadiness on feet R26.81    4. Decreased activities of daily living (ADL) R68.89    5. Vertigo R42          Assessment:     Patient demonstrates understanding/independence with home program. She will continue to use home Epley maneuver as able. Recommend she return to ENT for next steps as symptoms are mildly improved but continue to be severe with bed mobility. She reports that she feels pretty good when she's up and around ad that her balance is good. She reports vertigo when in bed.    Goal Status:  Patient will be able to walk: for community mobility;without loss of balance;in 8 weeks  Patient able to perform bed mobility: without dizziness;in 8 weeks  Patient will turn head: without dizziness;for conversation;in 8 weeks    Plan / Patient Education:     Continue with initial plan of care. Progress with home program as tolerated.    Subjective:     Pain Ratin    Objective:       Subjective progress relative to last visit:  Intensity of dizziness is:  less  Frequency of  dizziness is: less  Duration of dizziness is: less  Balance is:  unchanged    Positional Tests:  Hallpike Right:  Abnormal - Nystagmus right torsional, up beating, patient unable to keep eyes open to determine latency and fatigue  Hallpike Left:  vertigo but unable to keep eyes open to determine nystagmus  Head Roll Right: NT  Head Roll Left:  NT    Treatment Today: Treated with right Epley maneuver x1. Patient reports mild improvement over the last couple of visits but continues to have severe vertigo with bed mobility. Recommend she return to ENT  TREATMENT MINUTES COMMENTS   Evaluation     Self-care/ Home management     Neuromuscular Re-education     Canalith repositioning procedure 20          Total 20    Blank areas are intentional and mean the treatment did not include these items.          Karen Ny  4/9/2019  11:03 AM

## 2021-05-28 NOTE — TELEPHONE ENCOUNTER
RN cannot approve Refill Request    RN can NOT refill this medication med is not covered by policy/route to provider.    Ozzy Palm, [facility] Triage/Med Refill 4/22/2019    Requested Prescriptions   Pending Prescriptions Disp Refills     tiZANidine (ZANAFLEX) 2 MG tablet [Pharmacy Med Name: TIZANIDINE HCL 2 MG TABLET] 60 tablet 0     Sig: TAKE 1-2 TABS BY MOUTH AT BEDTIME AS NEEDED FOR NECK AND BACK PAIN       There is no refill protocol information for this order

## 2021-05-28 NOTE — TELEPHONE ENCOUNTER
RN cannot approve Refill Request    RN can NOT refill this medication med is not covered by policy/route to provider     . Last office visit: 1/23/2019 Anjelica Kapadia MD Last Physical: Visit date not found Last MTM visit: Visit date not found Last visit same specialty: 1/23/2019 Anjelica Kapadia MD.  Next visit within 3 mo: Visit date not found  Next physical within 3 mo: Visit date not found      Jacinta Ca, [facility] Triage/Med Refill 5/6/2019    Requested Prescriptions   Pending Prescriptions Disp Refills     mometasone (ELOCON) 0.1 % cream [Pharmacy Med Name: MOMETASONE FUROATE 0.1% CREAM] 45 g 3     Sig: APPLY TOPICALLY TWICE A DAY FOR 2 WEEKS, THEN TAKE 1 WEEK OFF. MAY REPEAT       There is no refill protocol information for this order

## 2021-05-29 NOTE — TELEPHONE ENCOUNTER
RN cannot approve Refill Request    RN can NOT refill this medication med is not covered by policy/route to provider. Last office visit: 1/23/2019 Anjelica Kapadia MD Last Physical: Visit date not found Last MTM visit: Visit date not found Last visit same specialty: 1/23/2019 Anjelica Kapadia MD.  Next visit within 3 mo: Visit date not found  Next physical within 3 mo: Visit date not found      Angella Baker, [facility] Triage/Med Refill 6/17/2019    Requested Prescriptions   Pending Prescriptions Disp Refills     tiZANidine (ZANAFLEX) 2 MG tablet [Pharmacy Med Name: TIZANIDINE HCL 2 MG TABLET] 60 tablet 0     Sig: TAKE 1-2 TABS BY MOUTH AT BEDTIME AS NEEDED FOR NECK AND BACK PAIN       There is no refill protocol information for this order

## 2021-05-29 NOTE — PROGRESS NOTES
[facility] Care Coordination Contact    Received after visit chart from care coordinator.  Completed following tasks: Mailed copy of care plan to client, Updated services in access and Submitted referrals/auths for [facility] and [facility] Transportation with [facility].      Provider Signature - No POC Shared:  Member indicates that they do not want their POC shared with any [facility] providers.    Man Stovall  Care Management Specialist  [facility]  [phone]

## 2021-05-29 NOTE — PROGRESS NOTES
[facility] Care Coordination Contact  [facility] Home Visit Assessment     Home visit for Health Risk Assessment with Radha Mcmanus completed on 6/13/2019    Type of residence:: Apartment - handicap accessible  Current living arrangement:: I live alone     Assessment completed with:: Patient    Current Care Plan  Member currently receiving the following home care services:     Member currently receiving the following community resources: PCA, County Programs(Adult Day Care), Care Coordination    Medication Review  Medication reconciliation completed in Epic: YES  Medication set-up & administration: Family/informal caregiver sets up daily  Family caregiver administers medications  Medication Risk Assessment Medication (1 or more, place referral to MTM):  N/A: No risk factors identified  MTM Referral Placed: No: No risk factors idenified    Mental/Behavioral Health   Depression Screening: See PHQ assessment flowsheet.          Mental Health Diagnosis: No  Mental Health Services: None: No further intervention needed at this time.    Falls Assessment:   Fallen 2 or more times in the past year?: No   Any fall with injury in the past year?: No    ADL/IADL Dependencies:   Dependent ADLs:: Bathing, Dressing, Eating, Grooming, Positioning  Dependent IADLs:: Cleaning, Cooking, Laundry, Shopping, Meal Preparation, Medication Management, Money Management, Transportation    [facility] sponsored benefits: Shared information re: Silver Sneakers/gym memberships, ASA, Calcium +D.    PCA Assessment completed at visit: Yes    Elderly Waiver Eligibility: Yes - will continue on EW    Care Plan & Recommendations: Care Coordinator met face to face with Radha for her annual Health Risk Assessment/Waiver Reassessment and PCA Reassessment visit.  Care Coordinator completed a new LTCC, OBRA Level 1, POC and PCA Assessment with Radha.  Radha continues to qualify for 4 hours per day of PCA.  Radha would like to continue to attend  ADC 3 days per week.  Radha reported one fall in the past year but reports it wasn't really a fall as much as she got dizzy, grabbed onto a side rail in the hallway and slowly sat herself down to the floor.  Radha had one ER visit in the past year due to her dizziness as well.  She has seen her PCP about her dizziness and has gotten a medication as well as did PT to help.  She shares that her dizziness has gotten better compared to what it was but it still is a constant issue.  Radha reports that she never leaves her apartment alone due to confusion and dizziness.  Radha has a cane and a walker and feels safe using her cane at this time.  Radha had no questions, concerns or needs at this time.  She is happy with all services in place and did not request any changes at this time.      See [facility] for detailed assessment information.    Follow-Up Plan: Member informed of future contact, plan to f/u with member with a 6 month telephone assessment.  Contact information shared with member and family, encouraged member to call with any questions or concerns at any time.    [place] care continuum providers: Please refer to Health Care Home on the Epic Problem List to view this patient's [facility] Care Plan Summary.    Lyn Erazo, ANJEL  [facility]  [phone]

## 2021-05-29 NOTE — PROGRESS NOTES
[facility] Care Coordination Contact    Called adult son Ronny to schedule annual HRA home visit. HRA has been scheduled for Thursday, June 13 at 12pm..     ANJEL Kearns  [facility]  [phone]

## 2021-05-29 NOTE — PROGRESS NOTES
[facility] Care Coordination Contact    [facility]:  Emailed completed PCA assessment to [facility].  Faxed copy of PCA assessment to [facility] Agency and mailed copy to member.  Faxed MD Communication to PCP.     Man Stovall  Care Management Specialist  [facility]  [phone]

## 2021-05-30 VITALS — BODY MASS INDEX: 22.98 KG/M2 | HEIGHT: 66 IN | WEIGHT: 143 LBS

## 2021-05-30 NOTE — TELEPHONE ENCOUNTER
Refill Approved    Rx renewed per Medication Renewal Policy. Medication was last renewed on 10/10/18.    Jacinta Ca, Care Connection Triage/Med Refill 7/26/2019     Requested Prescriptions   Pending Prescriptions Disp Refills     alendronate (FOSAMAX) 70 MG tablet [Pharmacy Med Name: ALENDRONATE SODIUM 70 MG TAB] 12 tablet 2     Sig: TAKE 1 TAB BY MOUTH EVERY 7 DAYS. TAKE WITH LARGE GLASS OR WATER & DO NOT LIE DOWN FOR 2HRS AFTER.       Biphosphonates Refill Protocol Passed - 7/26/2019  3:27 AM        Passed - PCP or prescribing provider visit in last 12 months     Last office visit with prescriber/PCP: 1/23/2019 Anjelica Kapadia MD OR same dept: 1/23/2019 Anjelica Kapadia MD OR same specialty: 1/23/2019 Anjelica Kapadia MD  Last physical: Visit date not found Last MTM visit: Visit date not found   Next visit within 3 mo: Visit date not found  Next physical within 3 mo: Visit date not found  Prescriber OR PCP: Anjelica Kapadia MD  Last diagnosis associated with med order: There are no diagnoses linked to this encounter.  If protocol passes may refill for 12 months if within 3 months of last provider visit (or a total of 15 months).             Passed - Serum creatinine in last 12 months     Creatinine   Date Value Ref Range Status   10/23/2018 0.74 0.60 - 1.10 mg/dL Final                         
no back pain, no gout, no musculoskeletal pain, no neck pain, and no weakness.
DISPLAY PLAN FREE TEXT

## 2021-05-31 VITALS — HEIGHT: 66 IN | BODY MASS INDEX: 22.51 KG/M2 | WEIGHT: 140.06 LBS

## 2021-05-31 VITALS — HEIGHT: 66 IN | WEIGHT: 139.1 LBS | BODY MASS INDEX: 22.35 KG/M2

## 2021-05-31 VITALS — WEIGHT: 144 LBS | BODY MASS INDEX: 23.24 KG/M2

## 2021-05-31 VITALS — WEIGHT: 139.4 LBS | BODY MASS INDEX: 22.4 KG/M2 | HEIGHT: 66 IN

## 2021-05-31 VITALS — WEIGHT: 140.7 LBS | BODY MASS INDEX: 22.71 KG/M2

## 2021-05-31 VITALS — BODY MASS INDEX: 22.95 KG/M2 | WEIGHT: 142.2 LBS

## 2021-05-31 NOTE — PATIENT INSTRUCTIONS - HE
1. Headache can be from sinus infection/inflammation. Take antibiotic (doxycicline) and antiinflammatory medication (medrol dose pack).    2. Pain with pressing on the chest is due to arthritis . Medrol dose pack will decrease inflammation and help with it. Continue Tylenol twice a day.    3. Pain in the chest with walking can be heart related, I recommend that you have stress test done. Take aspirin 81 mg with food daily for the heart until we do stress test and see results.    .

## 2021-05-31 NOTE — PROGRESS NOTES
[facility] Follow Up Note    Assessment/Plan:    1. Dysuria  Urinalysis today was negative for UTI  - Urinalysis-UC if Indicated    2. Chest pain due to myocardial ischemia, unspecified ischemic chest pain type  It appears the patient is describing to type of chest pains.  One is exertional and suggestive of stable angina.  Another is costochondritis.  Will check blood work today.  EKG did show some T wave abnormality in anterior leads.  I recommended that she start some baby aspirin and has a stress test done.  - Electrocardiogram Perform - Clinic  - HM1(CBC and Differential)  - Comprehensive Metabolic Panel  - LDL Cholesterol, Direct  - Thyroid Stimulating Hormone (TSH)  - Troponin I  - HM1 (CBC with Diff)  - NM Exercise Stress Test; Future    3. Tension headache  We will check ESR due to temporal pain to rule out vasculitis.  - Erythrocyte Sedimentation Rate  - methylPREDNISolone (MEDROL DOSEPACK) 4 mg tablet; follow package directions  Dispense: 21 tablet; Refill: 0    4. Costochondritis  This is reproducible on exam.  No lumps in her breast.  Medrol should help with inflammation  - methylPREDNISolone (MEDROL DOSEPACK) 4 mg tablet; follow package directions  Dispense: 21 tablet; Refill: 0    5. Acute non-recurrent maxillary sinusitis  That could be causing worsening headaches.  Will try Medrol Dosepak and antibiotic.  - methylPREDNISolone (MEDROL DOSEPACK) 4 mg tablet; follow package directions  Dispense: 21 tablet; Refill: 0  - doxycycline (MONODOX) 100 MG capsule; Take 1 capsule (100 mg total) by mouth 2 (two) times a day for 7 days.  Dispense: 14 capsule; Refill: 0    Anjelica Kapadia MD    Chief Complaint:  Chief Complaint   Patient presents with     Headache     x 2 weeks     Dysuria     burning       History of Present Illness:  Radha is a 88 y.o. female  originally from [place] who is currently here with  ..  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  She has complaints of chest pain.    Patient reports that she has had exertional chest pain for the last month.  Usually is better with rest.  Occasionally she gets palpitations.  Does not last very long.  She is not on aspirin.  She denies any shortness of breath.    She also has been having pains in her sternum provoked by palpation and also positional changes.  That is reproducible on exam.    She does have mild dysuria and urinary frequency that keeps her up at night but urinalysis today is negative for UTI.    She also has been having headaches for the last 2 weeks.  She points to her left temple and frontal area.  She denies any vision changes.  She has been taking Tylenol twice a day.  She has been sleeping fine.    Review of Systems:  A comprehensive review of systems was performed and was otherwise negative.  She does suffer from constipation    PFSH:  Social History: Reviewed  Social History     Tobacco Use   Smoking Status Never Smoker   Smokeless Tobacco Never Used     Social History     Social History Narrative    Patient lives by herself but does have PCA who helps with cleaning and cooking.  She does have 2 sons in the area.  She is originally from [place] [place].       Past History: Reviewed  Current Outpatient Medications   Medication Sig Dispense Refill     acetaminophen (TYLENOL) 500 MG tablet Take 500-1,000 mg by mouth.       alendronate (FOSAMAX) 70 MG tablet TAKE 1 TAB BY MOUTH EVERY 7 DAYS. TAKE WITH LARGE GLASS OR WATER & DO NOT LIE DOWN FOR 2HRS AFTER. 12 tablet 0     amLODIPine (NORVASC) 5 MG tablet Take 1 tablet (5 mg total) by mouth daily. 90 tablet 3     diclofenac sodium (VOLTAREN) 1 % Gel Apply 4 grams twice a day for shoulder pain 100 g 1     doxycycline (MONODOX) 100 MG capsule  "Take 1 capsule (100 mg total) by mouth 2 (two) times a day for 7 days. 14 capsule 0     fluticasone (FLONASE) 50 mcg/actuation nasal spray 2 sprays into each nostril daily. 16 g 11     lactose-reduced food with fibr Liqd Take 1 Bottle by mouth 2 (two) times a day as needed. (Patient taking differently: Take 1 Bottle by mouth as needed. ) 60 Bottle 6     loratadine (CLARITIN) 10 mg tablet Take 1 tablet (10 mg total) by mouth daily. 30 tablet 11     meclizine (ANTIVERT) 25 mg tablet Take 1 tablet (25 mg total) by mouth 3 (three) times a day as needed for dizziness or nausea. 30 tablet 1     methylPREDNISolone (MEDROL DOSEPACK) 4 mg tablet follow package directions 21 tablet 0     mometasone (ELOCON) 0.1 % cream APPLY TOPICALLY TWICE A DAY FOR 2 WEEKS, THEN TAKE 1 WEEK OFF. MAY REPEAT 45 g 3     nut.tx.gluc.intol,lac-free,soy (GLUCERNA SHAKE) Liqd DRINK ONE BOTTLE BY MOUTH TWICE DAILY AS NEEDED 77298 mL 4     omeprazole (PRILOSEC) 20 MG capsule TAKE 1 CAPSULE BY MOUTH EVERY DAY 90 capsule 0     psyllium (KONSYL) Powd Take by mouth as needed.        senna-docusate (SENOKOT-S) 8.6-50 mg tablet Take 1 tablet by mouth daily. 30 tablet 6     tiZANidine (ZANAFLEX) 2 MG tablet TAKE 1-2 TABS BY MOUTH AT BEDTIME AS NEEDED FOR NECK AND BACK PAIN 60 tablet 0     traZODone (DESYREL) 50 MG tablet Take 0.5 tablets (25 mg total) by mouth at bedtime. 45 tablet 3     No current facility-administered medications for this visit.        Family History: Reviewed    Physical Exam:    Vitals:    08/30/19 1147   BP: 136/80   Patient Site: Left Arm   Patient Position: Sitting   Cuff Size: Adult Regular   Pulse: 76   Resp: 16   SpO2: 97%   Weight: 137 lb (62.1 kg)   Height: 5' 6\" (1.676 m)     Wt Readings from Last 3 Encounters:   08/30/19 137 lb (62.1 kg)   01/23/19 142 lb 12.8 oz (64.8 kg)   12/28/18 142 lb 12.8 oz (64.8 kg)     Body mass index is 22.11 kg/m .    Constitutional:  Reveals a pleasant  female.  Vitals:  Per nursing " notes.  HEENT:No cervical LAD, no thyromegaly,  conjunctiva is pink, no scleral icterus, TMs are visualized and normal bl, oropharynx is clear, no exudates, mild sinus tenderness to palpation of the maxillary sinus on the.  Also mild tenderness to palpation of the left temple.  Cardiac:  Regular rate and rhythm,no murmurs, rubs, or gallops. Legs without edema. Palpation of the radial pulse regular.  Tenderness to palpation of her left sternum and places of attachments of the ribs to sternum.  Lungs: Clear to auscultation bl.  Respiratory effort normal.  Abdomen:positive BS, soft, nontender, nondistended.  No hepato-splenomagaly  Rheumatologic: Arthritic changes in her hands noted  Neurologic:  Cranial nerves II-XII intact.     Psychiatric: affect appropriate, memory intact.   Breast exam: No axillary lymphadenopathy, breast masses or skin changes appreciated    Data Review:    Analysis and Summary of Old Records (2): yes      Records Requested (1): mno      Other History Summarized (from other people in the room) (2): no    Radiology Tests Summarized (XRAY/CT/MRI/DXA) (1): no    Labs Reviewed (1): yes    Medicine Tests Reviewed (EKG/ECHO/COLONOSCOPY/EGD) (1): no    Independent Review of EKG or X-RAY (2): ekg

## 2021-05-31 NOTE — TELEPHONE ENCOUNTER
New Appointment Needed  What is the reason for the visit:    Assessment for Adult Day Care  Provider Preference: PCP only  How soon do you need to be seen?: As soon as able this week. Please call patients son to advise whether or not this could work with Dr. Montoya schedule.  Waitlist offered?: No  Okay to leave a detailed message:  Yes

## 2021-05-31 NOTE — TELEPHONE ENCOUNTER
Refill Approved    Rx renewed per Medication Renewal Policy. Medication was last renewed on 10.23.18.    Kathleen Mccullough, Care Connection Triage/Med Refill 8/21/2019     Requested Prescriptions   Pending Prescriptions Disp Refills     omeprazole (PRILOSEC) 20 MG capsule [Pharmacy Med Name: OMEPRAZOLE DR 20 MG CAPSULE] 90 capsule 1     Sig: TAKE 1 CAPSULE BY MOUTH EVERY DAY       GI Medications Refill Protocol Passed - 8/20/2019  1:12 AM        Passed - PCP or prescribing provider visit in last 12 or next 3 months.     Last office visit with prescriber/PCP: 1/23/2019 Anjelica Kapadia MD OR same dept: 1/23/2019 Anjelica Kapadia MD OR same specialty: 1/23/2019 Anjelica Kapadia MD  Last physical: Visit date not found Last MTM visit: Visit date not found   Next visit within 3 mo: Visit date not found  Next physical within 3 mo: Visit date not found  Prescriber OR PCP: Anjelica Kapadia MD  Last diagnosis associated with med order: 1. GERD (gastroesophageal reflux disease)  - omeprazole (PRILOSEC) 20 MG capsule [Pharmacy Med Name: OMEPRAZOLE DR 20 MG CAPSULE]; TAKE 1 CAPSULE BY MOUTH EVERY DAY  Dispense: 90 capsule; Refill: 1    If protocol passes may refill for 12 months if within 3 months of last provider visit (or a total of 15 months).

## 2021-05-31 NOTE — TELEPHONE ENCOUNTER
Attempted #1. No answer and message left to call back with [place]  ID # [id number] and provided the office number. Okay to relay message below upon call back. Thank Kindly.      We received adult day care form from [facility]. Pt need to be see with Dr. Kapadia in order to complete the form. Please assist with patient to schedule for appt.

## 2021-06-01 ENCOUNTER — RECORDS - HEALTHEAST (OUTPATIENT)
Dept: ADMINISTRATIVE | Facility: CLINIC | Age: 86
End: 2021-06-01

## 2021-06-01 VITALS — BODY MASS INDEX: 23.31 KG/M2 | WEIGHT: 144.4 LBS

## 2021-06-01 VITALS — HEIGHT: 66 IN | BODY MASS INDEX: 22.42 KG/M2 | WEIGHT: 139.5 LBS

## 2021-06-02 VITALS — BODY MASS INDEX: 23.05 KG/M2 | WEIGHT: 142.8 LBS

## 2021-06-02 VITALS — WEIGHT: 142.8 LBS | BODY MASS INDEX: 23.05 KG/M2

## 2021-06-02 VITALS — BODY MASS INDEX: 22.6 KG/M2 | WEIGHT: 140 LBS

## 2021-06-02 NOTE — TELEPHONE ENCOUNTER
Refill Approved    Rx renewed per Medication Renewal Policy. Medication was last renewed on 10/25/2018.  Last OV 8/30/2019.    Nova Roger, Care Connection Triage/Med Refill 11/1/2019     Requested Prescriptions   Pending Prescriptions Disp Refills     amLODIPine (NORVASC) 5 MG tablet [Pharmacy Med Name: AMLODIPINE BESYLATE 5 MG TAB] 90 tablet 1     Sig: TAKE 1 TABLET BY MOUTH EVERY DAY       Calcium-Channel Blockers Protocol Passed - 11/1/2019  1:39 AM        Passed - PCP or prescribing provider visit in past 12 months or next 3 months     Last office visit with prescriber/PCP: 8/30/2019 Anjelica Kapadia MD OR same dept: 8/30/2019 Anjelica Kapadia MD OR same specialty: 8/30/2019 Anjelica Kapadia MD  Last physical: Visit date not found Last MTM visit: Visit date not found   Next visit within 3 mo: Visit date not found  Next physical within 3 mo: Visit date not found  Prescriber OR PCP: Anjelica Kapadia MD  Last diagnosis associated with med order: 1. Essential hypertension  - amLODIPine (NORVASC) 5 MG tablet [Pharmacy Med Name: AMLODIPINE BESYLATE 5 MG TAB]; TAKE 1 TABLET BY MOUTH EVERY DAY  Dispense: 90 tablet; Refill: 1    If protocol passes may refill for 12 months if within 3 months of last provider visit (or a total of 15 months).             Passed - Blood pressure filed in past 12 months     BP Readings from Last 1 Encounters:   08/30/19 136/80

## 2021-06-03 VITALS — HEIGHT: 66 IN | BODY MASS INDEX: 22.02 KG/M2 | WEIGHT: 137 LBS

## 2021-06-03 NOTE — TELEPHONE ENCOUNTER
Refill Approved    Rx renewed per Medication Renewal Policy. Medication was last renewed on 8/21/19.    Jacinta Ca, Care Connection Triage/Med Refill 11/17/2019     Requested Prescriptions   Pending Prescriptions Disp Refills     omeprazole (PRILOSEC) 20 MG capsule [Pharmacy Med Name: OMEPRAZOLE DR 20 MG CAPSULE] 90 capsule 0     Sig: TAKE 1 CAPSULE BY MOUTH EVERY DAY       GI Medications Refill Protocol Passed - 11/16/2019 12:35 AM        Passed - PCP or prescribing provider visit in last 12 or next 3 months.     Last office visit with prescriber/PCP: 8/30/2019 Anjelica Kapadia MD OR same dept: 8/30/2019 Anjelica Kapadia MD OR same specialty: 8/30/2019 Anjelica Kapadia MD  Last physical: Visit date not found Last MTM visit: Visit date not found   Next visit within 3 mo: Visit date not found  Next physical within 3 mo: Visit date not found  Prescriber OR PCP: Anjelica Kapadia MD  Last diagnosis associated with med order: 1. GERD (gastroesophageal reflux disease)  - omeprazole (PRILOSEC) 20 MG capsule [Pharmacy Med Name: OMEPRAZOLE DR 20 MG CAPSULE]; TAKE 1 CAPSULE BY MOUTH EVERY DAY  Dispense: 90 capsule; Refill: 0    If protocol passes may refill for 12 months if within 3 months of last provider visit (or a total of 15 months).
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
## 2021-06-03 NOTE — PATIENT INSTRUCTIONS - HE
1. If chest pain gets worse with exertion, have stress tst done (I ordered it earlier this year).    2. For chest wall pain, try Lidocaine patch (ask pharmacist about it, it;s behind counter and santana not require prescription). If that does not work, let me know and we can add small dose Gabapentin medication at night to help with pain.

## 2021-06-04 VITALS
HEIGHT: 66 IN | BODY MASS INDEX: 22.22 KG/M2 | SYSTOLIC BLOOD PRESSURE: 136 MMHG | OXYGEN SATURATION: 97 % | HEART RATE: 88 BPM | DIASTOLIC BLOOD PRESSURE: 68 MMHG | WEIGHT: 138.25 LBS

## 2021-06-04 VITALS
OXYGEN SATURATION: 98 % | HEART RATE: 84 BPM | HEIGHT: 65 IN | DIASTOLIC BLOOD PRESSURE: 66 MMHG | WEIGHT: 135.5 LBS | SYSTOLIC BLOOD PRESSURE: 118 MMHG | BODY MASS INDEX: 22.57 KG/M2

## 2021-06-04 NOTE — PATIENT INSTRUCTIONS - HE
1. HCTZ b/p medication was started in the hospital due to high b/p . It can cause low potassium and your potassium level was low on admission.  That's why I would like you to stop HCTZ and start Losartan  b/p medication instead which helps with b/p and keeping potassium level normal.    Stop HCTZ, stop Potassium.     2. Repeat blood work to check potassium in 2 weeks.     3. Continue Lidocaine patch  for localized chest pain
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
## 2021-06-05 ENCOUNTER — RECORDS - HEALTHEAST (OUTPATIENT)
Dept: INTERNAL MEDICINE | Facility: CLINIC | Age: 86
End: 2021-06-05

## 2021-06-05 DIAGNOSIS — R94.31 ABNORMAL ELECTROCARDIOGRAM: ICD-10-CM

## 2021-06-05 DIAGNOSIS — R07.9 CHEST PAIN: ICD-10-CM

## 2021-06-06 NOTE — PROGRESS NOTES
[facility] Care Coordination Contact      [facility] Six-Month Telephone Assessment    6 month telephone assessment completed on 02/26/2020.    ER visits: Yes -  [facility]  Hospitalizations: Yes -  [facility]  TCU stays: No  Significant health status changes: None  Falls/Injuries: No  ADL/IADL changes: No  Changes in services: No    Caregiver Assessment follow up:  Radha shared she is doing well and her health is stable.  She had one ER visit due to chest pain but is doing better now.  Continues to go to [facility] and is happy about that.  She continues to have PCA and that is going well.  No questions, concerns or needs at this time.    Goals: See POC in chart for goal progress documentation.     Will see member in 6 months for an annual health risk assessment.   Encouraged member to call CC with any questions or concerns in the meantime.       ANJEL Kearns  [facility]  [phone]

## 2021-06-08 NOTE — PROGRESS NOTES
[facility] Care Coordination Contact    Received after visit chart from care coordinator.  Completed following tasks: Mailed copy of care plan to client.     Provider Signature - No POC Shared:  Member indicates that they do not want their POC shared with any [facility] providers.     [facility]:  Emailed completed PCA assessment to [facility].  Faxed copy of PCA assessment to PCA Agency and mailed copy to member.  Faxed MD Communication to PCP.     Mailed: EMILY, PCA Sig, POC Sig to member with an enclosed return envelope.     Man Stovall  Care Management Specialist  [facility]  [phone]

## 2021-06-08 NOTE — PROGRESS NOTES
[facility] Care Coordination Contact  [facility] Home Visit Assessment     Home visit for Health Risk Assessment with Radha Mcmanus completed on 5/12/2020    Type of residence:: Apartment - handicap accessible  Current living arrangement:: I live alone     Assessment completed with:: Patient    Current Care Plan  Member currently receiving the following home care services:     Member currently receiving the following community resources: PCA, [facility], Day Care    Medication Review  Medication reconciliation completed in Epic: YES  Medication set-up & administration: Family/informal caregiver sets up every two weeks  Family caregiver administers medications  Medication Risk Assessment Medication (1 or more, place referral to MTM):  N/A: No risk factors identified  MTM Referral Placed: No: No risk factors idenified    Mental/Behavioral Health   Depression Screening:    PHQ-2 Total Score: 0       Mental health DX:: No        Falls Assessment:   Fallen 2 or more times in the past year?: No   Any fall with injury in the past year?: No    ADL/IADL Dependencies:   Dependent ADLs:: Bathing, Ambulation-walker, Ambulation-cane, Dressing, Eating, Grooming, Incontinence, Transfers, Toileting  Dependent IADLs:: Cleaning, Cooking, Laundry, Shopping, Meal Preparation, Transportation, Medication Management, Money Management    [facility] sponsored benefits: Shared information re: Silver Sneakers/gym memberships, ASA, Calcium +D.    PCA Assessment completed at visit: Yes Annual PCA assessment indicated 27 units per day of PCA. This is an increase from the previous assessment.     Elderly Waiver Eligibility: Yes - will continue on EW    Care Plan & Recommendations: Annual Health Risk Assessment/Waiver Reassessment/PCA Reassessment visit completed via telephone with Radha and her Son - Said, due to COVID-19 restrictions.  Radha reports dependencies with dressing, grooming, bathing, eating, transferring, mobility  and toileting at this time.  Radha reports she continue to have dizziness which puts her at risk for falls.  Radha reports severe back pain, especially in the morning.  Radha reports tremors in her hands which makes eating, grooming and dressing very difficult.  Radha reports no falls in the past year but is scared to fall.  Radha was attending [facility] Care 3 days per week but since COVID-19 it has temporarily shut down.  Radha would like to continue to receive PCA services and attend [facility] 3 days per week once it reopens.  No further questions, concerns or needs reported from Radha or her son at this time.    See [facility] for detailed assessment information.    Follow-Up Plan: Member informed of future contact, plan to f/u with member with a 6 month telephone assessment.  Contact information shared with member and family, encouraged member to call with any questions or concerns at any time.    [place] care continuum providers: Please refer to Health Care Home on the Epic Problem List to view this patient's [facility] Care Plan Summary.    Lyn Erazo, ANJEL  [facility]  [phone]

## 2021-06-08 NOTE — PROGRESS NOTES
[facility] Care Coordination Contact    Completed following tasks:    Updated services in access and Submitted referrals/auths for [facility] 3 days/wk and 6 trips/wk with [place].     Man Stovall  Care Management Specialist  [facility]  [phone]

## 2021-06-08 NOTE — PROGRESS NOTES
[facility] Care Coordination Contact    Received: POC Sig page, PCA Sig page, and EMILY from member. Faxed PCA Sig page to health plan and provider. ANTONIO faxed to HE medical record.     Man Stovall  Care Management Specialist  [facility]  [phone]

## 2021-06-09 NOTE — PROGRESS NOTES
[facility] Follow Up Note  Assessment/Plan  1. Chronic idiopathic constipation     2. Hypothyroidism     3. Chronic pain syndrome     4. Abdominal pain     5. Headache     6. Abdominal Pain     7. Hypokalemia  Basic Metabolic Panel   8. Atypical Chest Pain     9. Influenza B         Patient Instructions   1. Medications were reviewed and medication refills completed if requested.   A corrected Medication List is listed below on this After Visit    Summary.   New Medications, Refilled medications or Discontinued medications are also listed below.      2. Immunizations were reviewed and updated as necessary.   All up to date - curiously she just got the flu shot for this year upon discharge from hospital with a diagnosis of influenza B.  3. If labs were done today, they will either be mailed to you or released to My Chart online if that has been activated.  We'll check an electrolyte panel today but we would not anticipate hypokalemia ongoing and would not anticipate the need for potassium replacement.  4. Consider diagnostic mammogram to evaluate breast pain - the patient has complained of pain in her left breast for years and on her last visit refused a mammogram.  Again today she refuses a mammogram.  5.  We reviewed her outside chest x-ray which is copied at the bottom of this note.  It showed no explanation for her chest wall pain.  6.  I refilled her tramadol pain pills.  7.  I would recommend that she take the console both laxative on a daily basis.  8.  A prescription was sent today for MiraLAX to use on an as needed basis for more severe constipation.     MORE THAN 40 MINUTES WAS SPENT WITH THE PATIENT TODAY OF WHICH GREATER THAN 50% WAS SPENT IN COUNSELING AND COORDINATION OF CARE -  DISCUSSING THE AFOREMENTIONED DIAGNOSES, TREATMENT, AND PLAN.       Body mass index is 23.08 kg/(m^2).    Lawrence Mckeon MD  Internal Medicine & Travel Medicine  [address]  [place] [address]  Voice: [phone]  Fax: [phone]    +++++++++++++++++++++++++++++++++++++++    Radha Mcmanus   86 y.o. female    Date of Visit: 2/23/2017    Chief Complaint   Patient presents with     Hospital Visit Follow Up    The patient is seen today with her son present as usual who acts as  as we do not have an  today.  Subjective  1. Health Maintenance  - she got a flu shot last week of discharge from hospital.    2.  Influenza B - on February 15 she was seen in urgent care diagnosed with influenza B and then subsequently was admitted to hospital for a couple of days at [facility].  She had significant fever or chills and body aches but no significant cough.  She did also have problems with constipation and was discharged on Konsyl bulk laxative.  She was unaware that she is supposed to take this on a daily basis since I reinforced that.    3.  Chronic constipation  -one of the issues that she never sticks to a bowel regimen.  The basic regimen would be to use the Konsyl bulk laxative daily on the MiraLAX on a when necessary basis.    4.  Chronic pain syndrome with chronic chest wall pain - the patient and told me about this for years.  She had a recent chest x-ray that was negative at [facility].  She had previously and currently declined mammograms.  Due to the chronicity of this pain is unlikely that represents a breast neoplasm in any event.  I had also ordered a CAT scan of her chest last year when she complained of this chronic chest wall pain which she also did not do.  At this juncture we will defer that as well.    5.  Hypertension - patient's blood pressure is low normal today but she has no orthostatic problems.  Therefore I will leave her blood pressure medications alone.    6.  Hypokalemia - this is presumably a transient problem during her hospitalization.  We will check it today because her discharge potassium was 3.3.  She was given 5 potassium  tablets on discharge because they also anticipated she should not need potassium going forward.      ROS A comprehensive review of systems was performed and was negative other than the problems noted above.    Medications, allergies, and problem list were reviewed and updated    No past medical history on file.  No past surgical history on file.  Social History     Social History     Marital status: Single     Spouse name: N/A     Number of children: N/A     Years of education: N/A     Occupational History     Not on file.     Social History Main Topics     Smoking status: Never Smoker     Smokeless tobacco: Not on file     Alcohol use Not on file     Drug use: Not on file     Sexual activity: Not on file     Other Topics Concern     Not on file     Social History Narrative     No family history on file.    Current Outpatient Prescriptions   Medication Sig Dispense Refill     acetaminophen (TYLENOL) 500 MG tablet Take 500-1,000 mg by mouth.       amLODIPine (NORVASC) 5 MG tablet TAKE ONE TABLET BY MOUTH ONE TIME DAILY 90 tablet 2     GLUCERNA SHAKE Liqd DRINK ONE BOTTLE BY MOUTH TWICE DAILY AS NEEDED 38034 mL 4     lactose-reduced food with fibr Liqd Take 1 Bottle by mouth 2 (two) times a day as needed. 60 Bottle 6     omeprazole (PRILOSEC) 20 MG capsule TAKE ONE CAPSULE BY MOUTH ONE TIME DAILY 90 capsule 3     traMADol (ULTRAM) 50 mg tablet TAKE ONE TABLET BY MOUTH THREE TIMES DAILY AS NEEDED FOR PAIN 90 tablet 1     cholecalciferol, vitamin D3, 1,000 unit capsule Take 1 capsule (1,000 Units total) by mouth daily. 100 capsule 3     olopatadine (PATADAY) 0.2 % Drop 1 drop in each eye daily or twice daily 2.5 mL 6     polyethylene glycol (MIRALAX) 17 gram packet Take 1 packet (17 g total) by mouth daily. 30 each 3     psyllium (KONSYL) Powd Take by mouth.       No current facility-administered medications for this visit.        Allergies   Allergen Reactions     Erythromycin Base      Ioxaglate Sodium Unknown      Metrizamide Rash       Exam  Vitals:    02/23/17 1048   BP: 102/64   Pulse: 88     The patient is well-groomed and well-dressed alert and oriented ×3.   Head: Negative  HEENT: Normal  Lungs: Clear to auscultation without rales or wheezing.    Cardiac: No S3 murmur or ectopy.  Abdomen: Soft bowel sounds normal.  No significant local tenderness.  Extremities: No peripheral edema.  Pulses are normal and symmetrical.  Neuro: Gait and mentation normal. Cranial Nerves intact.            Lawrence Mckeon MD              2/15/2017 CXR:  IMPRESSION:  No focal infiltrates to suggest pneumonia. Marked  hyperinflation. Increased interstitial markings in both lungs, likely  chronic fibrosis. Osteopenia. Very mild vertebral body height loss of  several thoracic spine vertebral bodies.

## 2021-06-11 NOTE — PROGRESS NOTES
[facility] Follow Up Note  Assessment/Plan  1. Chest wall pain     2. Osteoporosis     3. Chronic pain syndrome  Ambulatory referral to Chiropractic   4. Back pain  Ambulatory referral to Chiropractic       Patient Instructions   1. Medications were reviewed and medication refills completed if requested.   A corrected Medication List is listed below on this After Visit    Summary.   New Medications, Refilled medications or Discontinued medications are also listed below.      2. Immunizations were reviewed and updated as necessary.   All up to date  3. If labs were done today, they will either be mailed to you or released to My Chart online if that has been activated.  4. Continue tramadol  5. Add on Senokot-S pill for constipation  6. Add on Gabapentin at bedtime for pain and sleep  7. Use Miralax powder for severe constipation       MORE THAN 25 MINUTES WAS SPENT WITH THE PATIENT TODAY OF WHICH GREATER THAN 50% WAS SPENT IN COUNSELING AND COORDINATION OF CARE -  DISCUSSING THE AFOREMENTIONED DIAGNOSES, TREATMENT, AND PLAN.      Body mass index is 23.24 kg/(m^2).    Lawrence Mckeon MD  Internal Medicine & [place] [address]  Voice: [phone]  Fax: [phone]    +++++++++++++++++++++++++++++++++++++++    Radha Mcmanus   86 y.o. female    Date of Visit: 7/13/2017    Chief Complaint   Patient presents with     Follow-up     The patient is seen today with her son present who acts as .  Subjective  1. Health Maintenance  -immunizations are up-to-date.  We believe she is up-to-date on immunizations and assume that she had her Pneumovax previously and had her Prevnar recently    2.  Acute and chronic chest pain and chronic pain syndrome -the patient's last visit she was losing weight and complaining of ongoing persistent fairly severe chest wall pain.  The obvious was concerned whether she could have an occult malignancy and so we  did a CT of the chest which was remarkable in that it had no remarkable abnormalities.  Despite her bitter complaints of severe chest wall pain her ribs and pleura and lungs and everything looked good.  At this point I think we are just dealing with a chronic pain issue.  She would like to go to a chiropractor and I think that is fine.  Is difficult to sort out but I do not believe that she is having a mononeuritis multiplex or anything like that.  She is complaining of chest wall pain and chest wall tenderness.  She is taking her tramadol 3 times daily faithfully and it helps some but not a great deal.  It seems to make her constipation perhaps worse.    She has difficulty sleeping at night and would like something for sleep and pain and so I think gabapentin would be good for that.  If she takes 300 mg a gabapentin may have a soporific effect and may also help with her pain at night.  Do not stop the tramadol but add on the gabapentin.  Subsequently we could potentially dial up the gabapentin the higher doses if it helps a little bit.    3.  Constipation -all the narcotic options tramadol has least constipation and I would not abandon it because of constipation.  She has MiraLAX at home to use for severe constipation and for maintenance I would add on Senokot S because it will give her 2 ingredients in 1 daily pill.  This should be more easy to be compliant with may be more effective than staying with her bulk laxative.      ROS A comprehensive review of systems was performed and was negative other than the problems noted above.    Medications, allergies, and problem list were reviewed and updated    No past medical history on file.  No past surgical history on file.  Social History     Social History     Marital status: Single     Spouse name: N/A     Number of children: N/A     Years of education: N/A     Occupational History     Not on file.     Social History Main Topics     Smoking status: Never Smoker      Smokeless tobacco: Not on file     Alcohol use Not on file     Drug use: Not on file     Sexual activity: Not on file     Other Topics Concern     Not on file     Social History Narrative     No family history on file.    Current Outpatient Prescriptions   Medication Sig Dispense Refill     acetaminophen (TYLENOL) 500 MG tablet Take 500-1,000 mg by mouth.       amLODIPine (NORVASC) 5 MG tablet TAKE ONE TABLET BY MOUTH ONE TIME DAILY 90 tablet 2     cholecalciferol, vitamin D3, 1,000 unit capsule Take 1 capsule (1,000 Units total) by mouth daily. 100 capsule 3     fluticasone (FLONASE) 50 mcg/actuation nasal spray 2 sprays into each nostril daily. 16 g 11     GLUCERNA SHAKE Liqd DRINK ONE BOTTLE BY MOUTH TWICE DAILY AS NEEDED 13225 mL 4     lactose-reduced food with fibr Liqd Take 1 Bottle by mouth 2 (two) times a day as needed. 60 Bottle 6     olopatadine (PATANOL) 0.1 % ophthalmic solution Administer 1 drop to both eyes 2 (two) times a day. 5 mL 1     omeprazole (PRILOSEC) 20 MG capsule TAKE ONE CAPSULE BY MOUTH ONE TIME DAILY 90 capsule 2     polyethylene glycol (MIRALAX) 17 gram packet Take 1 packet (17 g total) by mouth daily. 30 each 3     psyllium (KONSYL) Powd Take by mouth.       traMADol (ULTRAM) 50 mg tablet TAKE ONE TABLET BY MOUTH THREE TIMES DAILY AS NEEDED FOR PAIN 90 tablet 1     gabapentin (NEURONTIN) 300 MG capsule 1 TAB AT BEDTIME 30 capsule 5     senna-docusate (SENOKOT-S) 8.6-50 mg tablet Take 1 tablet by mouth daily. 30 tablet 6     No current facility-administered medications for this visit.        Allergies   Allergen Reactions     Erythromycin Base      Ioxaglate Sodium Unknown     Metrizamide Rash       Exam  Vitals:    07/13/17 1433   BP: 138/76   Pulse: (!) 102     The patient is well-groomed and well-dressed alert and oriented ×3.   Head: Negative  HEENT: Normal  Extremities: No peripheral edema.  Pulses are normal and symmetrical.  Neuro: Gait and mentation normal. Cranial Nerves  intact.        The entire visit was spent in counseling and coordination of care so we did not repeat a clinical exam      Lawrence Mckeon MD

## 2021-06-11 NOTE — TELEPHONE ENCOUNTER
Spoke with pt's son and explained that pcp was not the original perscriber of the medication, pt received it from the hospital.  Advised pt's son pt would need to be seen for an appt prior to pcp writing prescription, if pcp deemed it appropriate.  Pt's son understanding.  Appt scheduled for Monday.

## 2021-06-11 NOTE — PROGRESS NOTES
[facility] Follow Up Note  Assessment/Plan  1. Chest wall pain  CT Chest Without Contrast   2. Chronic Reflux Esophagitis     3. Insomnia     4. Chronic pain syndrome     5. Anxiety Disorder NOS         Patient Instructions   1. Medications were reviewed and medication refills completed if requested.   A corrected Medication List is listed below on this After Visit    Summary.   New Medications, Refilled medications or Discontinued medications are also listed below.      2. Immunizations were reviewed and updated as necessary.   All up to date  3. If labs were done today, they will either be mailed to you or released to My Chart online if that has been activated.  4. Tramadol 3 times daily as needed for pain  Digital tramadol does not work will be first use of stronger narcotics with increased side effects including constipation.  5. CT Chest -I will do a CT without contrast to be sure she does not have any complications the contrast  age 86.  If we are dealing with a neoplasm sufficiently advanced to cause severe pain and should show up without contrast.  Due to the chronicity of her pain in the chest wall nature and increased pain with palpation I am not going to do a CT angiogram to look for PE as I think that is also not likely.  6.  Is worried that she is having heart pain but clearly she is not.  7.  See me in 1 week to discuss CT chest and further treatment plan.  Combination of increasing chest wall pain with weight loss is certainly worrisome for potential occult malignancy.  Even though she is a non-smoker who never smoked it does not exclude the possibility of neoplasm.     MORE THAN 40 MINUTES WAS SPENT WITH THE PATIENT TODAY OF WHICH GREATER THAN 50% WAS SPENT IN COUNSELING AND COORDINATION OF CARE -  DISCUSSING THE AFOREMENTIONED DIAGNOSES, TREATMENT, AND PLAN.      Body mass index is 22.45 kg/(m^2).    Lawrence Mckeon MD  Internal Medicine & Travel Medicine  [facility]  [address] [place] [address]  Voice: [phone]  Fax: [phone]    +++++++++++++++++++++++++++++++++++++++    Radha Mcmanus   86 y.o. female    Date of Visit: 7/6/2017    Chief Complaint   Patient presents with     Breast Pain     Left, sharp x 4 days     Headache     Insomnia     She seen today with her son present who acts as .  Subjective  1. Health Maintenance  -we did not deal with health maintenance issues today    2.  Chest wall pain and weight loss -patient has an abundant history of pain.  She has had chronic headaches for years.  She has had chronic abdominal pain.  She has had chronic chest wall pain.  What she is experiencing today is worse than when she has had before but is similar to what she had last year.  On August 30, 2016 I had put an order for a diagnostic mammogram on for a CT chest and she did not complete either 1.  Now she has lost about 16 pounds in the last 10-11 months and is complaining of increasing chest pain within the last 4 days.  The pain is worse with deep breathing or movement.  The pain is exacerbated by palpation or pressure on her chest.  The pain itself is exacerbating her chronic insomnia.  She is of course a non-smoker who never smoked.    She has come back repeatedly and we have given her tramadol and then she does not take it when she comes back complaining of pain.  Today's pain seems to be substantially worse than previous episodes.  She tried taking one tramadol which was not helpful.  Before we progressed to Vicodin or oxycodone or stronger narcotics I would try taking the tramadol at least 3 times daily as it should give her some benefit.  I would then see her in follow-up in 1 week to see if it works if not will be for her to give her stronger narcotics.  Of course and she is more likely to get nausea or constipation.    She also had some weight loss recently.  3.  Insomnia -this is a chronic issue but made worse by her chest  pain.    4.  Chronic pain syndrome- above the patient has an abundant history of various pains including headaches chest pain and abdominal pain.  She gets tramadol and that she does not take it.      ROS A comprehensive review of systems was performed and was negative other than the problems noted above.    Medications, allergies, and problem list were reviewed and updated    No past medical history on file.  No past surgical history on file.  Social History     Social History     Marital status: Single     Spouse name: N/A     Number of children: N/A     Years of education: N/A     Occupational History     Not on file.     Social History Main Topics     Smoking status: Never Smoker     Smokeless tobacco: Not on file     Alcohol use Not on file     Drug use: Not on file     Sexual activity: Not on file     Other Topics Concern     Not on file     Social History Narrative     No family history on file.    Current Outpatient Prescriptions   Medication Sig Dispense Refill     acetaminophen (TYLENOL) 500 MG tablet Take 500-1,000 mg by mouth.       amLODIPine (NORVASC) 5 MG tablet TAKE ONE TABLET BY MOUTH ONE TIME DAILY 90 tablet 2     cholecalciferol, vitamin D3, 1,000 unit capsule Take 1 capsule (1,000 Units total) by mouth daily. 100 capsule 3     GLUCERNA SHAKE Liqd DRINK ONE BOTTLE BY MOUTH TWICE DAILY AS NEEDED 28144 mL 4     lactose-reduced food with fibr Liqd Take 1 Bottle by mouth 2 (two) times a day as needed. 60 Bottle 6     olopatadine (PATANOL) 0.1 % ophthalmic solution Administer 1 drop to both eyes 2 (two) times a day. 5 mL 1     omeprazole (PRILOSEC) 20 MG capsule TAKE ONE CAPSULE BY MOUTH ONE TIME DAILY 90 capsule 2     polyethylene glycol (MIRALAX) 17 gram packet Take 1 packet (17 g total) by mouth daily. 30 each 3     psyllium (KONSYL) Powd Take by mouth.       traMADol (ULTRAM) 50 mg tablet TAKE ONE TABLET BY MOUTH THREE TIMES DAILY AS NEEDED FOR PAIN 90 tablet 1     fluticasone (FLONASE) 50  mcg/actuation nasal spray 2 sprays into each nostril daily. 16 g 11     No current facility-administered medications for this visit.        Allergies   Allergen Reactions     Erythromycin Base      Ioxaglate Sodium Unknown     Metrizamide Rash       Exam  Vitals:    07/06/17 1114   BP: 120/74   Pulse: 84     The patient is well-groomed and well-dressed alert and oriented ×3.   Head: Negative  HEENT: Normal  Lungs: Clear to auscultation without rales or wheezing.    She has exquisite chest wall tenderness on the left side.  Costochondral area and inframammary area are particularly tender.  We did a breast exam today and she does not truly have any pain or tenderness or mass in the breast tissue per se  Cardiac: No S3 murmur or ectopy.  Abdomen: Soft bowel sounds normal.  No significant local tenderness.  Extremities: No peripheral edema.  Pulses are normal and symmetrical.  Neuro: Gait abnormal and mentation ?? if normal. Cranial Nerves intact.            Lawrence Mckeon MD

## 2021-06-11 NOTE — PROGRESS NOTES
[facility] Follow Up Note  Assessment/Plan  1. Allergic rhinitis     2. Allergic conjunctivitis     3. Chronic pain syndrome     4. Hypertension     5. Cervicalgia  Ambulatory referral to PT/OT   6. Chronic Constipation     7. Hypothyroidism     8. Conjunctivitis, allergic, bilateral  olopatadine (PATADAY) 0.2 % Drop       Patient Instructions   1. Medications were reviewed and medication refills completed if requested.   A corrected Medication List is listed below on this After Visit    Summary.   New Medications, Refilled medications or Discontinued medications are also listed below.      2. Immunizations were reviewed and updated as necessary.   All up to date  3. If labs were done today, they will either be mailed to you or released to My Chart online if that has been activated.  4. Prednisone 20 mg daily x 7 days.  Prednisone will totally knocked out for allergic rhinitis and allergic conjunctivitis likely.  Primary reason to prescribe it is because of her severe cervicalgia.  She wishes to do physical therapy which will order, but my fear is that she has such severe neck pain currently that she will be able to tolerate the physical therapy without the prednisone.  5. Fluticasone nasal spray -refill as she is uses previously  6. Continue Claritin (Loratadine) -she is happy with Claritin so will not switch her back to Zyrtec.  7. Pataday eye drops - later we got a call that Pataday drops were no longer covered by her insurance as they were last year and so we switched the Patanol eyedrops.  8. Physical Therapy Referral -would like massage or therapy and of course massage is not covered.  Therapy will be problematic based on the severity of her neck pain and so I prescribed prednisone.  9. We discussed today that the patient will need to get a new primary care provider after September 1, 2017.    MORE THAN 40 MINUTES WAS SPENT WITH THE PATIENT TODAY OF WHICH GREATER THAN 50% WAS SPENT IN COUNSELING  AND COORDINATION OF CARE -  DISCUSSING THE AFOREMENTIONED DIAGNOSES, TREATMENT, AND PLAN.         Body mass index is 22.5 kg/(m^2).    Lawrence Mckeon MD  Internal Medicine [facility] [place] [address]  Voice: [phone]  Fax: [phone]    +++++++++++++++++++++++++++++++++++++++    Radha Mcmanus   86 y.o. female    Date of Visit: 6/13/2017    Chief Complaint   Patient presents with     Itchy Eye     bilateral      Neck Pain     back, 5 day      The patient is seen today with her son present who acts as .  Subjective  1. Health Maintenance  -immunizations are up-to-date.     2.  Allergic rhinitis and allergic conjunctivitis -patient is currently using Claritin but needs something for her eyes.  Rhinitis also is not completely controlled by Claritin.  Am not sure why she simply does not get refills but she has been treated in multiple years with fluticasone and Zyrtec and Pataday eyedrops.  She is happy with Claritin I will not refill the Zyrtec but I refilled the fluticasone and Pataday eyedrops.  Turns out this year her insurance will pay for Pataday so we had to switch that the Patanol after she left.    3.  Chronic pain syndrome -patient always has shoulder pain and neck pain but has an acute exacerbation of the neck pain today.  She has been maintained on tramadol which works pretty well and she needs a refill of that.    4.   Cervicalgia  - today her primary pain complaint is severe neck pain and limitation of movement.  She has midline cervical spine pain with paraspinal tenderness.  She has very limited range of motion of her neck.  At times her neck pain radiates to the shoulder especially on the left and occasionally it radiates all the way down her arm left side.    Like to get neck massage but of course that is not directly covered by insurance.  She can do physical therapy and would like to do so.  As part of physical therapy there may  be some component of massage.  Based on the severity of her neck pain that was determined after her exam today I do not think she can tolerate therapy very well and so I think were going to need to treat her with prednisone.  Clearly she is not a candidate for any surgical intervention and so imaging is probably not appropriate.    5.  Chronic dyspepsia and GERD -remains on omeprazole.    6.  Hypertension  -blood pressure is optimal today and she has no orthostatic side effects.      ROS A comprehensive review of systems was performed and was negative other than the problems noted above.    Medications, allergies, and problem list were reviewed and updated    No past medical history on file.  No past surgical history on file.  Social History     Social History     Marital status: Single     Spouse name: N/A     Number of children: N/A     Years of education: N/A     Occupational History     Not on file.     Social History Main Topics     Smoking status: Never Smoker     Smokeless tobacco: Not on file     Alcohol use Not on file     Drug use: Not on file     Sexual activity: Not on file     Other Topics Concern     Not on file     Social History Narrative     No family history on file.    Current Outpatient Prescriptions   Medication Sig Dispense Refill     acetaminophen (TYLENOL) 500 MG tablet Take 500-1,000 mg by mouth.       amLODIPine (NORVASC) 5 MG tablet TAKE ONE TABLET BY MOUTH ONE TIME DAILY 90 tablet 2     cholecalciferol, vitamin D3, 1,000 unit capsule Take 1 capsule (1,000 Units total) by mouth daily. 100 capsule 3     GLUCERNA SHAKE Liqd DRINK ONE BOTTLE BY MOUTH TWICE DAILY AS NEEDED 92015 mL 4     lactose-reduced food with fibr Liqd Take 1 Bottle by mouth 2 (two) times a day as needed. 60 Bottle 6     olopatadine (PATADAY) 0.2 % Drop 1 drop in each eye daily or twice daily 2.5 mL 6     omeprazole (PRILOSEC) 20 MG capsule TAKE ONE CAPSULE BY MOUTH ONE TIME DAILY 90 capsule 2     polyethylene glycol  (MIRALAX) 17 gram packet Take 1 packet (17 g total) by mouth daily. 30 each 3     psyllium (KONSYL) Powd Take by mouth.       traMADol (ULTRAM) 50 mg tablet TAKE ONE TABLET BY MOUTH THREE TIMES DAILY AS NEEDED FOR PAIN 90 tablet 1     fluticasone (FLONASE) 50 mcg/actuation nasal spray 2 sprays into each nostril daily. 16 g 11     predniSONE (DELTASONE) 20 MG tablet Take 1 tablet (20 mg total) by mouth daily for 7 days. 7 tablet 0     No current facility-administered medications for this visit.        Allergies   Allergen Reactions     Erythromycin Base      Ioxaglate Sodium Unknown     Metrizamide Rash       Exam  Vitals:    06/13/17 1404   BP: 116/68   Pulse: 72     The patient is well-groomed and well-dressed alert and oriented ×3.   Head: Negative  HEENT: Normal except poor dentition  NECK: She has exquisite cervical spine tenderness directly over the cervical spine.  He also has significant bilateral paraspinal cervical tenderness.  She has no significant trapezius tenderness.  Range of motion shows lateral rotation only 45  to the right and 45  to the left.  Lungs: Clear to auscultation without rales or wheezing.    Extremities: No peripheral edema.  Pulses are normal and symmetrical.  Neuro: Gait abnormal and mentation normal. Cranial Nerves intact.  I am unable to detect any mental status problems today but part of the problems that her son interprets for her.  We believe that she does have some component of memory loss but I cannot substantiate that by exam today.              Lawrence Mckeon MD

## 2021-06-11 NOTE — TELEPHONE ENCOUNTER
Please help schedule:    1. Lab appt for blood work and pneumonia shot, b/p check with nurse    2. DEXA at [place] in Nov

## 2021-06-11 NOTE — TELEPHONE ENCOUNTER
Medication Request  Medication name: tramadol 50 mg tab  Requested [facility]: [facility]  Reason for request: New Rx request  When did you use medication last?:  n/a  Patient offered appointment:  N/A - electronic request  Okay to leave a detailed message: no

## 2021-06-11 NOTE — PROGRESS NOTES
"Radha Mcmanus is a 89 y.o. female who is being evaluated via a billable telephone visit.      The patient has been notified of following:     \"This telephone visit will be conducted via a call between you and your physician/provider. We have found that certain health care needs can be provided without the need for a physical exam.  This service lets us provide the care you need with a short phone conversation.  If a prescription is necessary we can send it directly to your pharmacy.  If lab work is needed we can place an order for that and you can then stop by our lab to have the test done at a later time.    Telephone visits are billed at different rates depending on your insurance coverage. During this emergency period, for some insurers they may be billed the same as an in-person visit.  Please reach out to your insurance provider with any questions.    If during the course of the call the physician/provider feels a telephone visit is not appropriate, you will not be charged for this service.\"    Patient has given verbal consent to a Telephone visit? Yes    What phone number would you like to be contacted at? [phone]    Patient would like to receive their AVS by AVS Preference: Mail a copy.    Additional provider notes:  [facility] Follow Up Note    Assessment/Plan:    1. Dermatitis  She has chronic itching in her legs.  She did see dermatologist for that in the past.  Claritin has helped but not resolved the rash and will switch to Zyrtec.  For localized itchy spots I recommended topical steroid.  We also discussed using [place] cream for moisturization and dry skin.  - mometasone (ELOCON) 0.1 % cream; APPLY TOPICALLY TWICE A DAY FOR 2 WEEKS, THEN TAKE 1 WEEK OFF. MAY REPEAT  Dispense: 45 g; Refill: 3  - cetirizine (ZYRTEC) 10 MG tablet; Take 1 tablet (10 mg total) by mouth daily.  Dispense: 30 tablet; Refill: 2  - HM1(CBC and Differential); Future    2. Osteoporosis  Bone density in 2018 showed " osteoporosis.  She did take Fosamax but did not refill it after she ran out of it.  Discussed to continue on it.  I recommend that she has repeat bone density test done later this year in [place] clinic in November.  We will check her vitamin D levels.  - alendronate (FOSAMAX) 70 MG tablet; TAKE 1 TAB BY MOUTH EVERY 7 DAYS. TAKE WITH LARGE GLASS OR WATER & DO NOT LIE DOWN FOR 2HRS AFTER.  Dispense: 12 tablet; Refill: 2  - DXA Bone Density Scan; Future    3. Vitamin D deficiency  - Vitamin D, Total (25-Hydroxy); Future    4. Need for vaccination  She will schedule a nursing visit to get pneumonia vaccine.  She will get flu shot in the pharmacy  - Pneumococcal polysaccharide vaccine 23-valent 1 yo or older, subq/IM; Future    5. Post-menopausal  - DXA Bone Density Scan; Future    6. Seasonal allergic rhinitis, unspecified trigger  - fluticasone propionate (FLONASE ALLERGY RELIEF) 50 mcg/actuation nasal spray; 1 spray into each nostril 2 (two) times a day.  Dispense: 16 g; Refill: 11    7. Itchy eyes  Most likely symptoms are due to seasonal allergies.  - olopatadine (PATANOL) 0.1 % ophthalmic solution; Administer 1 drop to both eyes 2 (two) times a day.  Dispense: 5 mL; Refill: 1    8. Essential hypertension  She is on amlodipine.  Previously was switched her hydrochlorothiazide and potassium pill to losartan pill.  At home per son blood pressure has been 120-114.  - Comprehensive Metabolic Panel;   - Lipid [place] FASTING; Future    9. Hyperlipidemia LDL goal <130  - Comprehensive Metabolic Panel; Future  - Lipid [place] FASTING; Future      Anjelica Kapadia MD    Chief Complaint:  Chief Complaint   Patient presents with     Headache     Telephone CALL: [phone] (Said - son with her right now) intermittent HA's - not using anything otc      Skin Problems     Bilateral thighs/legs dry itchy skin - currently using regular lotion (with no relief)      Eye Drainage     pt reports itchy & watery bliateral eyes x 1  wk- no redness, no pain       History of Present Illness:  Radha is a 89 y.o. female who originally from [place] who is currently here with   and her son..  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  She is currently here for telephone visit with several concerns.  Her son is on the phone interpreting.    Patient has chronic itchy skin in her legs.  She has seen dermatologist for that in the past.  Currently she is having exacerbation of symptoms.  Son denies any visible rash.  Been using over-the-counter lotion but it does not help.  She is currently on Claritin which helps a little bit with itching.  She also has nasal congestion and postnasal drainage and itchy eyes.    Previously when I saw her in December we changed her hydrochlorothiazide and potassium supplement to losartan 25 mg a day.  She is also on amlodipine.  Son reports that at home blood pressure is 120-114.    She also has chronic low back pain.  She has not been taking Tylenol and I recommended that she does.  She has history of osteoporosis.  Previously I prescribed Fosamax for her to take but she did not refill it after she ran out of prescription.  She is due for repeat bone density test as well this year.    Review of Systems:  A comprehensive review of systems was performed and was otherwise negative    PFSH:  Social History: Viewed  Social History     Tobacco Use   Smoking Status Never Smoker   Smokeless Tobacco Never Used     Social History     Social History Narrative    Patient lives by herself but does have PCA who helps with cleaning and cooking.  She does have 2 sons in the area.  She is originally from [place].       Past History: Reviewed  Current Outpatient Medications   Medication Sig  Dispense Refill     acetaminophen (TYLENOL) 500 MG tablet Take 500-1,000 mg by mouth every 6 (six) hours as needed for pain.       amLODIPine (NORVASC) 5 MG tablet TAKE 1 TABLET BY MOUTH EVERY DAY 90 tablet 3     aspirin 81 MG EC tablet Take 81 mg by mouth daily.       diclofenac sodium (VOLTAREN) 1 % Gel Apply 4 g topically 2 (two) times a day. For shoulder pain.       lactose-reduced food with fibr Liqd Take 1 Bottle by mouth 2 (two) times a day as needed. 60 Bottle 6     losartan (COZAAR) 25 MG tablet Take 1 tablet (25 mg total) by mouth daily. 90 tablet 3     mometasone (ELOCON) 0.1 % cream APPLY TOPICALLY TWICE A DAY FOR 2 WEEKS, THEN TAKE 1 WEEK OFF. MAY REPEAT 45 g 3     nut.tx.gluc.intol,lac-free,soy (GLUCERNA SHAKE) Liqd DRINK ONE BOTTLE BY MOUTH TWICE DAILY AS NEEDED 41126 mL 4     omeprazole (PRILOSEC) 20 MG capsule TAKE 1 CAPSULE BY MOUTH EVERY DAY 90 capsule 2     psyllium (KONSYL) Powd Take by mouth as needed.        traZODone (DESYREL) 50 MG tablet Take 0.5 tablets (25 mg total) by mouth at bedtime. 45 tablet 3     alendronate (FOSAMAX) 70 MG tablet TAKE 1 TAB BY MOUTH EVERY 7 DAYS. TAKE WITH LARGE GLASS OR WATER & DO NOT LIE DOWN FOR 2HRS AFTER. 12 tablet 2     cetirizine (ZYRTEC) 10 MG tablet Take 1 tablet (10 mg total) by mouth daily. 30 tablet 2     fluticasone propionate (FLONASE ALLERGY RELIEF) 50 mcg/actuation nasal spray 1 spray into each nostril 2 (two) times a day. 16 g 11     meclizine (ANTIVERT) 25 mg tablet Take 1 tablet (25 mg total) by mouth 3 (three) times a day as needed for dizziness or nausea. 30 tablet 1     olopatadine (PATANOL) 0.1 % ophthalmic solution Administer 1 drop to both eyes 2 (two) times a day. 5 mL 1     traMADol (ULTRAM) 50 mg tablet Take 0.5 tablets (25 mg total) by mouth 2 (two) times a day as needed for pain. 20 tablet 0     No current facility-administered medications for this visit.          Physical Exam: Limited by telephone encounter    There were no vitals  filed for this visit.  Wt Readings from Last 3 Encounters:   12/23/19 135 lb 8 oz (61.5 kg)   12/18/19 132 lb 6.4 oz (60.1 kg)   11/26/19 138 lb 4 oz (62.7 kg)     There is no height or weight on file to calculate BMI.    Pleasant female, no acute distress, awake alert and oriented.  No  Congestion, cough or respiratory symptoms noted.    Data Review:    Analysis and Summary of Old Records (2): yes      Records Requested (1): no      Other History Summarized (from other people in the room) (2): no    Radiology Tests Summarized (XRAY/CT/MRI/DXA) (1): no    Labs Reviewed (1): yes    Medicine Tests Reviewed (EKG/ECHO/COLONOSCOPY/EGD) (1): no    Independent Review of EKG or X-RAY (2): no    Telephone start time: 12:25 PM  Telephone visit end time: 12:38 PM  Phone call duration: 12 minutes

## 2021-06-11 NOTE — TELEPHONE ENCOUNTER
Left message through  line to call and make appointment for Radha to have bloodwork, pneumonia vaccine and bp check

## 2021-06-11 NOTE — TELEPHONE ENCOUNTER
Controlled Substance Refill Request  Medication Name:   Requested Prescriptions     Pending Prescriptions Disp Refills     traMADoL (ULTRAM) 50 mg tablet 20 tablet 0     Sig: Take 0.5 tablets (25 mg total) by mouth 2 (two) times a day as needed for pain.     Date Last Fill: 12/18/19  [facility]  Submit electronically to pharmacy  Controlled Substance Agreement on file:   Encounter-Level CSA Scan Date:    There are no encounter-level csa scan date.        Last office visit:  12/23/19

## 2021-06-11 NOTE — TELEPHONE ENCOUNTER
Refill Request  Did you contact pharmacy: Yes  Medication name:   Requested Prescriptions     Pending Prescriptions Disp Refills     traMADoL (ULTRAM) 50 mg tablet 20 tablet 0     Sig: Take 0.5 tablets (25 mg total) by mouth 2 (two) times a day as needed for pain.     Who prescribed the medication: Abel Freire  [facility]  Is patient out of medication: N/A  Patient notified refills processed in 3 business days:  N/A  Okay to leave a detailed message: yes

## 2021-06-12 NOTE — TELEPHONE ENCOUNTER
Refill Approved    Rx renewed per Medication Renewal Policy. Medication was last renewed on 11/1/19.    Jacinta Ca, [facility] Triage/Med Refill 11/3/2020     Requested Prescriptions   Pending Prescriptions Disp Refills     amLODIPine (NORVASC) 5 MG tablet [Pharmacy Med Name: AMLODIPINE BESYLATE 5 MG TAB] 90 tablet 3     Sig: TAKE 1 TABLET BY MOUTH EVERY DAY       Calcium-Channel Blockers Protocol Passed - 10/30/2020 12:19 AM        Passed - PCP or prescribing provider visit in past 12 months or next 3 months     Last office visit with prescriber/PCP: 12/23/2019 Anjelica Kapadia MD OR same dept: Visit date not found OR same specialty: 12/23/2019 Anjelica Kapadia MD  Last physical: Visit date not found Last MTM visit: Visit date not found   Next visit within 3 mo: Visit date not found  Next physical within 3 mo: Visit date not found  Prescriber OR PCP: Anjelica Kapadia MD  Last diagnosis associated with med order: 1. Essential hypertension  - amLODIPine (NORVASC) 5 MG tablet [Pharmacy Med Name: AMLODIPINE BESYLATE 5 MG TAB]; TAKE 1 TABLET BY MOUTH EVERY DAY  Dispense: 90 tablet; Refill: 3    If protocol passes may refill for 12 months if within 3 months of last provider visit (or a total of 15 months).             Passed - Blood pressure filed in past 12 months     BP Readings from Last 1 Encounters:   09/29/20 112/64

## 2021-06-13 NOTE — PROGRESS NOTES
[facility] Follow Up Note    Assessment/Plan:    1. Influenza vaccination given  - Influenza High Dose, Seasonal 65+ yrs    2. Itchy skin  I suspect that she had some insect bites, possibly flea in her ankles which causes itchiness.  No evidence of peripheral vascular disease, swelling or dermatitis.  We will try triamcinolone cream twice a day.  Patient was instructed to use it for no more than 2 weeks.  If rash starts spreading she will let me know.  - Comprehensive Metabolic Panel  - Thyroid Stimulating Hormone (TSH)  - triamcinolone (KENALOG) 0.1 % cream; Apply 2ce a day for 2 weeks, then stop  Dispense: 80 g; Refill: 0    3. HTN (hypertension)  She does complain about mild orthostasis.  That could have been triggered by adding Neurontin to her medications as well but it does help with her pain.  We will decrease her amlodipine from 5-2.5 mg (patient will cut pill in half.  - Comprehensive Metabolic Panel  - Thyroid Stimulating Hormone (TSH)    4. Chronic pain  Patient has severe chronic lumbar DJD as reviewed on MRI, history of compression fractures.  She is on tramadol and Neurontin.  She reports that her pain is controlled.    5. Low blood potassium  Unclear etiology.  Potassium has been low on several occasions in the past.  She is not on a diuretic.  We will check her potassium levels  - Comprehensive Metabolic Panel    6. Leukopenia  White count and platelets were on the lower side in February.  Unclear what triggered it.  Will repeat CBC today and check for vitamin deficiencies.  - HM1(CBC and Differential)  - Comprehensive Metabolic Panel  - Vitamin B12  - Folate, Serum  - HM1 (CBC with Diff)    7. Vitamin D deficiency  Patient take multivitamin only  - Vitamin D, Total (25-Hydroxy)      Anjelica Kapadia MD    Chief Complaint:  Chief Complaint   Patient presents with     Rash     both legs 4-5days       History of Present Illness:  Radha is a 86 y.o. female originally from [place] who is  currently here with his  for acute visit.  She has history of osteoporosis, chronic low back pain, vitamin D deficiency, dementia (per chart), acid reflux, neuropathy, history of influenza in 2017, normal stress test in 2014, cataracts.  From reviewing her chart there is also question whether she is really [age] per previous neurosurgical note.    Patient is primarily concerned today is about each a rash on her legs.  It started 4 days ago.  She denies any insect bites.  She denies any pain.  She has been using Vaseline without help.  She denies itching anywhere else.  Upon examination it looks that she has small itchy bumps which could be an insect bite.  She denies any pets at home but her neighbor does have a dog.  Discussed that we will try and teach cream and if it is not helping her rash is worsening she will let me know.  Since she has no itchy spots anywhere else bedbugs a less likely.    Patient also complains about mild dizziness with standing up.  Her blood pressure today is normal.  She is on amlodipine 5 mg a day.  She was also started on Neurontin by Dr. Mckeon several weeks ago which could be causing mild dizziness.  She reports that Neurontin does help her sleep and with pain at night.  We discussed that we can decrease her amlodipine from 5-2.5 mg a day (she will cut her tablets).  She denies any syncope or presyncope.    From reviewing her chart it appears that her last blood work was done in February of this year and showed numerous abnormalities including low potassium level (patient is not on any diuretic), low white cell count and slightly low platelet count.  Unclear if patient was sick at that time.  We will repeat blood work today and include TSH, B12 and vitamin D testing.    Patient does suffer from chronic pain.  MRI of her spine was fairly impressive and showed compression fracture at L3, severe DJD at L4 and L5 and several disc desiccation.  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  She is currently on tramadol which she takes regularly.  Neurontin was started several months ago and it also helps.  She reports that her pain is controlled.  She does have constipation due to pain medications.  She is not taking any laxatives.  Once again I encouraged her to start taking MiraLAX every day and senna every second day for constipation as previously prescribed to her.    Review of Systems:  A comprehensive review of systems was performed and was otherwise negative.  Patient does have decreased vision due to cataracts but refuses surgery for it.  Patient denies claudication.    PFSH:  Social History: Reviewed  History   Smoking Status     Never Smoker   Smokeless Tobacco     Not on file     Social History     Social History Narrative    Patient lives by herself but does have PCA who helps with cleaning and cooking.  She does have 2 sons in the area.  She is originally from [facility].       Past History: Reviewed  Current Outpatient Prescriptions   Medication Sig Dispense Refill     acetaminophen (TYLENOL) 500 MG tablet Take 500-1,000 mg by mouth.       amLODIPine (NORVASC) 5 MG tablet TAKE ONE TABLET BY MOUTH ONE TIME DAILY 90 tablet 2     cetirizine (ZYRTEC) 10 MG tablet Take 1 tablet (10 mg total) by mouth daily. 90 tablet 0     cholecalciferol, vitamin D3, 1,000 unit capsule Take 1 capsule (1,000 Units total) by mouth daily. 100 capsule 3     fluticasone (FLONASE) 50 mcg/actuation nasal spray 2 sprays into each nostril daily. 16 g 11     gabapentin (NEURONTIN) 300 MG capsule 1 TAB AT BEDTIME 30 capsule 5     GLUCERNA SHAKE Liqd DRINK ONE BOTTLE BY MOUTH TWICE DAILY AS NEEDED 42621 mL 4     lactose-reduced food with fibr Liqd Take 1 Bottle by mouth 2 (two) times a day as needed. 60 Bottle 6     olopatadine (PATANOL) 0.1 % ophthalmic solution Administer 1 drop to both eyes 2 (two) times a day. 5 mL 1     omeprazole (PRILOSEC) 20 MG capsule TAKE ONE CAPSULE BY MOUTH ONE TIME DAILY 90 capsule 2      "polyethylene glycol (MIRALAX) 17 gram packet Take 1 packet (17 g total) by mouth daily. 30 each 3     psyllium (KONSYL) Powd Take by mouth.       senna-docusate (SENOKOT-S) 8.6-50 mg tablet Take 1 tablet by mouth daily. 30 tablet 6     traMADol (ULTRAM) 50 mg tablet TAKE ONE TABLET BY MOUTH THREE TIMES DAILY AS NEEDED FOR PAIN 90 tablet 1     triamcinolone (KENALOG) 0.1 % cream Apply 2ce a day for 2 weeks, then stop 80 g 0     No current facility-administered medications for this visit.        Family History: Reviewed parents are     Physical Exam:    Vitals:    10/03/17 1146   BP: 108/64   Patient Site: Left Arm   Patient Position: Sitting   Cuff Size: Adult Regular   Pulse: 78   Resp: 18   SpO2: 96%   Weight: 140 lb 1 oz (63.5 kg)   Height: 5' 6\" (1.676 m)     Wt Readings from Last 3 Encounters:   10/03/17 140 lb 1 oz (63.5 kg)   17 144 lb (65.3 kg)   17 139 lb 1.6 oz (63.1 kg)     Body mass index is 22.61 kg/(m^2).    Constitutional:  Reveals a pleasant  female accompanied by .  Vitals:  Per nursing notes.  HEENT:No cervical LAD, no thyromegaly,  conjunctiva is pink, no scleral icterus, I decreased still is very deem which is consistent with cataract.  oropharynx is clear, no exudates,   Cardiac:  Regular rate and rhythm,no murmurs, rubs, or gallops. Legs without edema. Palpation of the radial pulse regular.  Lungs: Clear to auscultation bl.  Respiratory effort normal.  Skin: Skin and lower extremities is normal color, I do not appreciate any rash but if you eat she bumps which could be secondary to insect bites or excoriations.  There is no rash on her feet or hands.  itchiness is only in her ankles.  DP pulses in her feet are good    Psychiatric: affect appropriate, memory intact.     Data Review:    Analysis and Summary of Old Records (2): Yes    Records Requested (1): No    Other History Summarized (from other people in the room) (2): No    Radiology Tests Summarized " (XRAY/CT/MRI/DXA) (1): Yes lumbar MRI, CT of the chest    Labs Reviewed (1): Yes including care everywhere    Medicine Tests Reviewed (EKG/ECHO/COLONOSCOPY/EGD) (1): Yes stress test    Independent Review of EKG or X-RAY (2): No

## 2021-06-14 NOTE — TELEPHONE ENCOUNTER
RN cannot approve Refill Request    RN can NOT refill this medication 6 month gap in refill requests.  PCP to determine refill. Thankyou!. Last office visit: 12/23/2019 Anjelica Kapadia MD Last Physical: Visit date not found Last MTM visit: Visit date not found Last visit same specialty: 12/23/2019 Anjelica Kapadia MD.  Next visit within 3 mo: Visit date not found  Next physical within 3 mo: Visit date not found      Rachael Tracy, [facility] [place] Connection Triage/Med Refill 2/1/2021    Requested Prescriptions   Pending Prescriptions Disp Refills     omeprazole (PRILOSEC) 20 MG capsule [Pharmacy Med Name: OMEPRAZOLE DR 20 MG CAPSULE] 90 capsule 2     Sig: TAKE 1 CAPSULE BY MOUTH EVERY DAY       GI Medications Refill Protocol Passed - 2/1/2021 12:17 AM        Passed - PCP or prescribing provider visit in last 12 or next 3 months.     Last office visit with prescriber/PCP: 12/23/2019 Anjelica Kapadia MD OR same dept: Visit date not found OR same specialty: 12/23/2019 Anjelica Kapadia MD  Last physical: Visit date not found Last MTM visit: Visit date not found   Next visit within 3 mo: Visit date not found  Next physical within 3 mo: Visit date not found  Prescriber OR PCP: Anjelica Kapadia MD  Last diagnosis associated with med order: 1. GERD (gastroesophageal reflux disease)  - omeprazole (PRILOSEC) 20 MG capsule [Pharmacy Med Name: OMEPRAZOLE DR 20 MG CAPSULE]; TAKE 1 CAPSULE BY MOUTH EVERY DAY  Dispense: 90 capsule; Refill: 2    If protocol passes may refill for 12 months if within 3 months of last provider visit (or a total of 15 months).

## 2021-06-14 NOTE — PROGRESS NOTES
[facility] Care Coordination Contact      [facility] Six-Month Telephone Assessment    6 month telephone assessment completed on 1/28/21.    ER visits: No  Hospitalizations: No  TCU stays: No  Significant health status changes: No  Falls/Injuries: No  ADL/IADL changes: No  Changes in services: No    Caregiver Assessment follow up:  Care Coordinator and [facility]  called Radha to complete her 6 month interim assessment.  Radha shared that she is doing well.  She shared she went to the doctor yesterday and found out she is anemic.  She was given medication for that and for acid reflux.  Radha reports she has had no falls in the past 6 months, no ER visits and no hospital stays.  Radha reports she tries to stay active with walking with her PCA and reports her mental health is stable.  Radha had no questions, concerns or needs at this time.  Radha asked if Care Coordinator will be coming out to see her at her annual or if it will be over the phone and Care Coordinator stated I was not sure at this time.  Care Coordinator will call her in April 2021 to schedule annual and let her know if it will be in person or via phone.    Goals: See POC in chart for goal progress documentation.      Will see member in 6 months for an annual health risk assessment.   Encouraged member to call CC with any questions or concerns in the meantime.     ANJEL Kearns  [facility]  [phone]

## 2021-06-14 NOTE — TELEPHONE ENCOUNTER
Please let pt's son  Know:    Bone density test shows significant osteoporosis (bone thinning) in her spine which increases risk for compression fracture. It is imporatnt that she takes her Fosamax medication once a week regularly for several years without stopping. It will slow bone loss. Continue vitamin D supplements. And take calcium citrate supplement 500 mg a day. I sent script in.

## 2021-06-14 NOTE — TELEPHONE ENCOUNTER
Son advised per provider message below. The patient indicates understanding of the result and instructions for follow up and continued care.

## 2021-06-14 NOTE — PROGRESS NOTES
[facility] Follow Up Note    Assessment/Plan:    1. Vertigo  Patient had mild upper respiratory infection recently which was viral.  Currently discussed that it could be sequela of inner ear inflammation.  She will continue with Flonase.  Since it bothers her mostly at night will try Antivert at night.  If symptoms persist she will see an ENT  - Ambulatory referral to ENT  - meclizine (ANTIVERT) 12.5 mg tablet; Take one tab as needed before bed  Dispense: 30 tablet; Refill: 0    2. Rash and itchy skin  Patient has itchiness in her legs of unclear etiology.  She also had mild hives on her upper body.  Currently she has not been taking her Zyrtec and we discussed that she should restart it.  Recent blood work showed normal liver and kidney function, thyroid function and B12 levels.  Triamcinolone did not help itchiness in her lower extremities.  I recommended that she sees a dermatologist  - Ambulatory referral to Dermatology    3.  Left sternal pain  This is chronic.  I did bilateral breast exam and it was normal, patient was tender to palpation of her sternum.  Discussed that most likely it is musculoskeletal.  Her recent vitamin D level was normal.    Anjelica Kapadia MD    Chief Complaint:  Chief Complaint   Patient presents with     Dizziness     x 5 days     Rash     on legs itchy        History of Present Illness:  Radha is a 86 y.o. female  female originally from [facility] who is currently here with her son who is interpreting..  She has history of osteoporosis, chronic low back pain, vitamin D deficiency, dementia (per chart), acid reflux, neuropathy, history of influenza in 2017, normal stress test in 2014, cataracts.  She is here because of ongoing dizziness and EEG skin.    On patient's last visit she complained about feeling dizzy and her blood pressure was on the lower side.  We discussed decreasing her amlodipine from 5-2.5 mg.  Per son they have done that and it has not improved her  dizziness and her blood pressure went up so currently she is taking a full tablet daily and systolic blood pressure is 120.  Patient reports that she is dizzy only when lying down.  It has been going on for several weeks.  She is not dizzy when she is standing up.  She has had upper respiratory infection couple of weeks ago but currently denies any headaches, nausea vomiting.  No ear pain or hearing loss.  She has been using Flonase daily.  Her ear exam is normal.  We discussed that most likely she has problems with an inner ear, possible inflammation due to recent viral infection or benign positional vertigo.  We discussed taking meclizine at bedtime as needed and if symptoms persist she can see an ENT.    Patient continues to have itchy skin.  On previous visit itchiness was mostly in her legs but not feet undressed small red bumps there.  I gave her prescription for triamcinolone cream and she used up the bottle and currently is not feeling better.  I do not appreciate any rash in her legs.  She also has developed some itchy bumps on her upper body on her arms.  She has 1 hive on her right arm and on her right chest.  She has not been taking her Zyrtec and we discussed that she should restart it.  I had her son take a picture of the hive.  If symptoms persist she should see a dermatologist.  I am not sure why her skin is itching her feet because currently there is no rash there.  Previous blood work showed normal renal and liver functions, thyroid function and B12.    Patient also complains about pain in her left thorax.  It is not in the breast but in the rib cage area and tender to palpation.  She reports that this is not new.  Sometimes it is painful for her to sleep on that side.    Review of Systems:  A comprehensive review of systems was performed and was otherwise negative    PFSH:  Social History: Reviewed  History   Smoking Status     Never Smoker   Smokeless Tobacco     Not on file     Social History      Social History Narrative    Patient lives by herself but does have PCA who helps with cleaning and cooking.  She does have 2 sons in the area.  She is originally from [place].       Past History: Reviewed  Current Outpatient Prescriptions   Medication Sig Dispense Refill     acetaminophen (TYLENOL) 500 MG tablet Take 500-1,000 mg by mouth.       amLODIPine (NORVASC) 5 MG tablet TAKE ONE TABLET BY MOUTH ONE TIME DAILY 90 tablet 2     cetirizine (ZYRTEC) 10 MG tablet Take 1 tablet (10 mg total) by mouth daily. 90 tablet 0     cholecalciferol, vitamin D3, 1,000 unit capsule Take 1 capsule (1,000 Units total) by mouth daily. 100 capsule 3     fluticasone (FLONASE) 50 mcg/actuation nasal spray 2 sprays into each nostril daily. 16 g 11     GLUCERNA SHAKE Liqd DRINK ONE BOTTLE BY MOUTH TWICE DAILY AS NEEDED 99276 mL 4     lactose-reduced food with fibr Liqd Take 1 Bottle by mouth 2 (two) times a day as needed. 60 Bottle 6     olopatadine (PATANOL) 0.1 % ophthalmic solution Administer 1 drop to both eyes 2 (two) times a day. 5 mL 1     omeprazole (PRILOSEC) 20 MG capsule TAKE ONE CAPSULE BY MOUTH ONE TIME DAILY 90 capsule 2     polyethylene glycol (MIRALAX) 17 gram packet Take 1 packet (17 g total) by mouth daily. 30 each 3     psyllium (KONSYL) Powd Take by mouth.       senna-docusate (SENOKOT-S) 8.6-50 mg tablet Take 1 tablet by mouth daily. 30 tablet 6     traMADol (ULTRAM) 50 mg tablet TAKE ONE TABLET BY MOUTH THREE TIMES DAILY AS NEEDED FOR PAIN 90 tablet 1     triamcinolone (KENALOG) 0.1 % cream Apply 2ce a day for 2 weeks, then stop 80 g 0     gabapentin (NEURONTIN) 300 MG capsule 1 TAB AT BEDTIME 30 capsule 5     meclizine (ANTIVERT) 12.5 mg tablet Take one tab as needed before bed 30 tablet 0     No current facility-administered medications for this visit.      Physical Exam:    Vitals:    11/16/17 1112   BP: 124/80   Patient Site: Left Arm   Patient Position: Sitting   Cuff Size: Adult Regular   Pulse: 81    SpO2: 98%   Weight: 140 lb 11.2 oz (63.8 kg)     Wt Readings from Last 3 Encounters:   11/16/17 140 lb 11.2 oz (63.8 kg)   10/03/17 140 lb 1 oz (63.5 kg)   07/13/17 144 lb (65.3 kg)     Body mass index is 22.71 kg/(m^2).    Constitutional:  Reveals a pleasant elderly female, mildly anxious, accompanied by her son.  Vitals:  Per nursing notes.  HEENT:, no thyromegaly,  conjunctiva is pink, no scleral icterus, TMs are visualized and normal bl, oropharynx is clear, no exudates, mild tender lymph node on the left side  Cardiac:  Regular rate and rhythm,no murmurs, rubs, or gallops. Legs without edema. Palpation of the radial pulse regular.  Lungs: Clear to auscultation bl.  Respiratory effort normal.  Abdomen:positive BS, soft, nontender, nondistended.  No hepato-splenomagaly  Skin: In her legs that do not appreciate any rash although skin is itchy per patient and she is scratching it and I can see scratch marks.  On the upper body there is a however in her right arm right chest and left forearm but no diffuse hives.  Rheumatologic: Normal joints and nails of the hands.  Neurologic:  Cranial nerves II-XII intact.     Psychiatric: affect appropriate, she gets anxious easily     Data Review:    Analysis and Summary of Old Records (2): Yes    Records Requested (1): No    Other History Summarized (from other people in the room) (2): Yes son    Radiology Tests Summarized (XRAY/CT/MRI/DXA) (1): No    Labs Reviewed (1): Yes    Medicine Tests Reviewed (EKG/ECHO/COLONOSCOPY/EGD) (1): No    Independent Review of EKG or X-RAY (2): No

## 2021-06-15 NOTE — PROGRESS NOTES
ASSESSMENT AND PLAN:      1. Dermatitis  Both LE with pruritis and soreness.  Exam is suggestive of nummular eczema, yet topical triamcinolone cream has not helped.  Given the somewhat nodular and tender elements, one can't r/o erythema nodosum.  She denies any history of TB.  An old xray report discusses apical scarring.  This could be idiopathic erythema nodosum.  It might require a short course of oral prednisone - 20mg daily for 10 days.  Yet, must r/o latent TB.  CXR today, read by me does not reveal sarcoidosis, or nodule.  Left apex seems to have possible scarring.  Need to get quantiferon - TB test.  If negative, could consider oral prednisone.  For now, she is reassured that there is nothing that looks serious..  Will try betamethasone ointment BID and follow up if fails to improve.  At her age, a positive TG-gold would be a relative contraindication to prednisone.  INH therapy risk and benefits would not favor treatment.   - XR Chest 2 Views  - QTF-Mycobacterium tuberculosis by QuantiFERON-TB Gold    ADDENDUM: QuantiFERON TB AG is positive suggesting past exposure to TB, essentially equivalent to positive TB.  CXR IS READ BY RADIOLOGY AS NEGATIVE FOR ACTIVE PROCESS. GIVEN PATIENT'S AGE, NO RX IS INDICATED.      Orders Placed This Encounter   Procedures     XR Chest 2 Views     Order Specific Question:   Reason for Exam (Describe Symptoms):     Answer:   possible erythema nodosum, history of granuloma, otherwise negative chest CT     Order Specific Question:   Can the procedure be changed per Radiologist protocol?     Answer:   Yes     QTF-Mycobacterium tuberculosis by QuantiFERON-TB Gold     There are no discontinued medications.    No Follow-up on file.    CHIEF COMPLAINT:  Chief Complaint   Patient presents with     Rash     on legs and back, itchy, x 2 months        HISTORY OF PRESENT ILLNESS:  Radha Mcmanus is a 87 y.o. female with continued itching and soreness of the lower anterior leg areas.  She  scratches and says that the triamcinolone cream has not helped.  No unusual cough, or dyspnea, or nausea, or fever, or chills or weight loss.  She denies exposure to TB or any history.  Has no other acute symptoms at this time.     REVIEW OF SYSTEMS:   See HPI, all other pertinent systems on review are negative.    VITALS:  Vitals:    01/25/18 1144   BP: 120/80   Patient Site: Left Arm   Patient Position: Sitting   Cuff Size: Adult Regular   Pulse: 72   Weight: 142 lb 3.2 oz (64.5 kg)     Wt Readings from Last 3 Encounters:   01/25/18 142 lb 3.2 oz (64.5 kg)   11/16/17 140 lb 11.2 oz (63.8 kg)   10/03/17 140 lb 1 oz (63.5 kg)       PHYSICAL EXAM:  Constitutional:  In NAD, alert and oriented  Neck:  no significant adenopathy.  Cardiac:  S1 S2 with/without murmur, rhythm regular  Lungs: Clear to auscultation  Abdomen:   Soft, flat and non-tender, no guarding, rebound, or mass.    Skin:   Nummular areas both anterior shins, somewhat indurated, tender, not fluctuant, and no drainage  Extremities: Joints without effusion or inflammation    REVIEW AND SUMMARIZATION OF OLD RECORDS, AND/OR OBTAINING HISTORY FROM SOMEONE OTHER THAN PATIENT, AND/OR DISCUSSION OF CASE WITH ANOTHER HEALTH CARE PROVIDER:  2 reviewed old records of imaging and lab testing from care everywhere    REVIEW AND/OR ORDER OF OF CLINICAL LAB TESTS: 1  quantiferon TB-gold.    REVIEW AND/OR ORDER OF RADIOLOGY TESTS: 1 CXR  .  INDEPENDENT  VISUALIZATION OF IMAGE, TRACING, OR SPECIMEN ITSELF (2 EACH):  Personally reviewed and interpreted CXR - no infiltrates, or hilar adenopathy.  ?apical scar MORENA?     TOTAL: 5    MEDICATIONS:  Current Outpatient Prescriptions   Medication Sig Dispense Refill     acetaminophen (TYLENOL) 500 MG tablet Take 500-1,000 mg by mouth.       amLODIPine (NORVASC) 5 MG tablet TAKE ONE TABLET BY MOUTH ONE TIME DAILY 90 tablet 2     cetirizine (ZYRTEC) 10 MG tablet TAKE 1 TABLET BY MOUTH DAILY 90 tablet 3     cholecalciferol, vitamin  D3, 1,000 unit capsule Take 1 capsule (1,000 Units total) by mouth daily. 100 capsule 3     fluticasone (FLONASE) 50 mcg/actuation nasal spray 2 sprays into each nostril daily. 16 g 11     gabapentin (NEURONTIN) 300 MG capsule 1 TAB AT BEDTIME 30 capsule 5     lactose-reduced food with fibr Liqd Take 1 Bottle by mouth 2 (two) times a day as needed. 60 Bottle 6     meclizine (ANTIVERT) 12.5 mg tablet Take one tab as needed before bed 30 tablet 0     nut.tx.gluc.intol,lac-free,soy (GLUCERNA SHAKE) Liqd DRINK ONE BOTTLE BY MOUTH TWICE DAILY AS NEEDED 84085 mL 4     olopatadine (PATANOL) 0.1 % ophthalmic solution Administer 1 drop to both eyes 2 (two) times a day. 5 mL 1     omeprazole (PRILOSEC) 20 MG capsule TAKE ONE CAPSULE BY MOUTH ONE TIME DAILY 90 capsule 2     polyethylene glycol (MIRALAX) 17 gram packet Take 1 packet (17 g total) by mouth daily. 30 each 3     psyllium (KONSYL) Powd Take by mouth.       senna-docusate (SENOKOT-S) 8.6-50 mg tablet Take 1 tablet by mouth daily. 30 tablet 6     traMADol (ULTRAM) 50 mg tablet TAKE ONE TABLET BY MOUTH THREE TIMES DAILY AS NEEDED FOR PAIN 90 tablet 1     triamcinolone (KENALOG) 0.1 % cream Apply 2ce a day for 2 weeks, then stop 80 g 0     betamethasone valerate (VALISONE) 0.1 % ointment Apply to rash area of legs twice daily until resolved. 30 g 0     No current facility-administered medications for this visit.      Luis Escamilla MD  Internal Medicine  [facility]

## 2021-06-16 NOTE — PROGRESS NOTES
[facility] Care Coordination    Received after visit chart from care coordinator.  Completed following tasks: Mailed copy of care plan to client, Updated services in access and Submitted referrals/auths for ADC and transportation   , Provider Signature - No POC Shared:  Member indicates that they do not want their POC shared with any EW providers.    [facility]:  Emailed completed PCA assessment to [facility].  Faxed copy of PCA assessment to PCA Agency and mailed copy to member.  Faxed MD Communication to PCP.     Member was mailed copies of the POC and PCA signature sheets and a EMILY to sign and return mail with a SASE.  This assessment was completed telephonically due to Covid-19.    Sydni Menjivar  Care Management Specialist  [facility]

## 2021-06-16 NOTE — PROGRESS NOTES
[facility] Care Coordination  [facility] Home Visit Assessment     Home visit for Health Risk Assessment with Radha Mcmanus completed on 4/6/2021    Type of residence:: Apartment  Current living arrangement:: I live alone     Assessment completed with: Patient    Current Care Plan  Member currently receiving the following home care services:     Member currently receiving the following community resources: PCA, County Programs, Day Care      Medication Review  Medication reconciliation completed in Epic: YES  Medication set-up & administration: Family/informal caregiver sets up every two weeks  Family caregiver administers medications  Medication Risk Assessment Medication (1 or more, place referral to MTM):  N/A: No risk factors identified  MTM Referral Placed: No: No risk factors idenified    Mental/Behavioral Health   Depression Screening:            Mental health DX:: No        Falls Assessment:   Fallen 2 or more times in the past year?: No   Any fall with injury in the past year?: No    ADL/IADL Dependencies:   Dependent ADLs:: Ambulation-cane, Bathing, Dressing, Eating, Grooming, Toileting, Transfers  Dependent IADLs:: Cleaning, Cooking, Laundry, Shopping, Meal Preparation, Medication Management, Money Management, Transportation    [facility] Plan sponsored benefits: Shared information re: Silver Sneakers/gym memberships, ASA, Calcium +D.    PCA Assessment completed at visit: Yes Annual PCA assessment indicated 27 units per day of PCA. This is the same as the previous assessment.     Elderly Waiver Eligibility: Yes - will continue on EW    Care Plan & Recommendations: Annual Health Risk Assessment/Waiver reassessment/PCA reassessment visit via phone with Radha and her son, Said.  Assessment via phone due to COVID-19 restrictions.  Radha reports she is doing very well.  She reports she just got her second COVID vaccine last Saturday so she is anxiously awaiting the two week justin so she can  go back to [facility] and back into the community.  Radha reports no falls, no ER visits and no Hospital stays.  Radha reports her PCA services are going.  She stated she is currently not attending [facility] but once it's been two weeks since her second vaccine she plans to go back to [facility] and wants to continue to go 3 days per week.  Radha continues to report dizziness and pain.  She reports she continues to try to walk at least 30 minutes several times per week in her apartment or in the hallway with her PCA.  Radha had no questions, concerns or needs at this time.  Radha wants to continue services as is.  No equipment/supplies needs at this time.        See RUST for detailed assessment information.    Follow-Up Plan: Member informed of future contact, plan to f/u with member with a 6 month telephone assessment.  Contact information shared with member and family, encouraged member to call with any questions or concerns at any time.    [place] care continuum providers: Please refer to Health Care Home on the Epic Problem List to view this patient's [facility] Care Plan Summary.    ANJEL Kearns  [facility]  [phone]

## 2021-06-16 NOTE — PROGRESS NOTES
[facility] Follow Up Note    Assessment/Plan:    1. Dermatitis  Unclear why she is itching and white topical steroids are not helpful.  I recommended that she follows up with dermatology for any further recommendations and allergist.  Will check MIGDALIA ESR and gluten and emerson allergens today.  For postnasal drainage recommended that she uses Flonase.  - Ambulatory referral to Dermatology  - Ambulatory referral to Allergy  - Antinuclear Antibody (MIGDALIA) Cascade  - Erythrocyte Sedimentation Rate  - Gluten IgE  - Emerson IgE, Food Allergen    2.  Positive QuantiFERON test.  Patient's chest x-ray was negative.  Discussed findings with son.  Discussed that she would not be a candidate for immunosuppressive medications like prednisone.    Anjelica Kapadia MD    Chief Complaint:  Chief Complaint   Patient presents with     Follow-up     itchy dry skin- tried bethamethasone didn't help       History of Present Illness:  Radha is a 87 y.o. female originally from [facility] who is currently here with her son who is interpreting..  She has history of osteoporosis, chronic low back pain, vitamin D deficiency, dementia (per chart), acid reflux, neuropathy, history of influenza in 2017, normal stress test in 2014, cataracts.  She is here because of ongoing itchy skin.    Patient is accompanied by her son who is translating.  Patient has never had any problems with itchy skin no rash but started having problems in the fall of last year.  I saw her in November and gave her some steroid cream and recommended that she see his dermatologist.  Note from dermatologist was reviewed.  Also gave her a steroid cream which has not helped.  Subsequently she was seen in the office and there was a concern for possible erythema nodosum.  There was a thought that maybe we could use oral prednisone to see if that helps with her rash.  Unfortunately her QuantiFERON test was positive that would preclude us from using prednisone.  Her chest  x-ray did not show any active TB.  Currently not of the topical steroids have helped.  Patient does take Claritin daily and it has not been very helpful.  She has other general allergy symptoms including itchy eyes for which she has to use drops and postnasal drainage.  She has a lot of postnasal mucus and recommended using Flonase.  Patient does have a lot of itchiness at night.  Son asked if she could use Benadryl.  Discussed that in her age group but it can cause confusion.    I wonder if she might be allergic to something that is causing this age.  Previous blood work including her liver and kidney and thyroid functions were normal.  Patient's diet includes a lot of homemade bread, pasta and rice.  She also eats bananas strawberries and mangoes.  Discussed that we will check for milka and repeat allergy and I also recommended that she see his allergist.  Also recommended that she follows up with dermatology to see if a biopsy of some of the lesions might be helpful.  It is hard to know if she is having palpable purpuric rash or excoriation due to itching.    Review of Systems:  A comprehensive review of systems was performed and was otherwise negative    PFSH:  Social History: Reviewed  History   Smoking Status     Never Smoker   Smokeless Tobacco     Never Used     Social History     Social History Narrative    Patient lives by herself but does have PCA who helps with cleaning and cooking.  She does have 2 sons in the area.  She is originally from [facility] [facility].       Past History: Reviewed  Current Outpatient Prescriptions   Medication Sig Dispense Refill     acetaminophen (TYLENOL) 500 MG tablet Take 500-1,000 mg by mouth.       amLODIPine (NORVASC) 5 MG tablet TAKE ONE TABLET BY MOUTH ONE TIME DAILY 90 tablet 2     cetirizine (ZYRTEC) 10 MG tablet TAKE 1 TABLET BY MOUTH DAILY 90 tablet 3     cholecalciferol, vitamin D3, 1,000 unit capsule Take 1 capsule (1,000 Units total) by mouth daily. 100 capsule 3      fluticasone (FLONASE) 50 mcg/actuation nasal spray 2 sprays into each nostril daily. 16 g 11     gabapentin (NEURONTIN) 300 MG capsule 1 TAB AT BEDTIME 30 capsule 5     lactose-reduced food with fibr Liqd Take 1 Bottle by mouth 2 (two) times a day as needed. 60 Bottle 6     meclizine (ANTIVERT) 12.5 mg tablet Take one tab as needed before bed 30 tablet 0     nut.tx.gluc.intol,lac-free,soy (GLUCERNA SHAKE) Liqd DRINK ONE BOTTLE BY MOUTH TWICE DAILY AS NEEDED 15966 mL 4     olopatadine (PATANOL) 0.1 % ophthalmic solution Administer 1 drop to both eyes 2 (two) times a day. 5 mL 1     omeprazole (PRILOSEC) 20 MG capsule TAKE ONE CAPSULE BY MOUTH ONE TIME DAILY 90 capsule 2     psyllium (KONSYL) Powd Take by mouth.       senna-docusate (SENOKOT-S) 8.6-50 mg tablet Take 1 tablet by mouth daily. 30 tablet 6     traMADol (ULTRAM) 50 mg tablet TAKE ONE TABLET BY MOUTH THREE TIMES DAILY AS NEEDED FOR PAIN 90 tablet 1     triamcinolone (KENALOG) 0.1 % cream Apply 2ce a day for 2 weeks, then stop 80 g 0     betamethasone valerate (VALISONE) 0.1 % ointment Apply to rash area of legs twice daily until resolved. 30 g 0     No current facility-administered medications for this visit.        Physical Exam:    Vitals:    03/14/18 1546   BP: 118/62   Patient Site: Left Arm   Patient Position: Sitting   Cuff Size: Adult Regular   Pulse: 86   SpO2: 95%   Weight: 144 lb 6.4 oz (65.5 kg)     Wt Readings from Last 3 Encounters:   03/14/18 144 lb 6.4 oz (65.5 kg)   01/25/18 142 lb 3.2 oz (64.5 kg)   11/16/17 140 lb 11.2 oz (63.8 kg)     Body mass index is 23.31 kg/(m^2).    Constitutional:  Reveals a pleasant  female.  Vitals:  Per nursing notes.  HEENT:No cervical LAD, no thyromegaly,  conjunctiva is pink, no scleral icterus, TMs are visualized and normal bl, oropharynx is clear, no exudates, she does have a slight polyp in her left nostril  Cardiac:  Regular rate and rhythm,no murmurs, rubs, or gallops.  Legs without edema.  Palpation of the radial pulse regular.  Lungs: Clear to auscultation bl.  Respiratory effort normal.  Abdomen:positive BS, soft, nontender, nondistended.  No hepato-splenomagaly  Skin: There are excoriations in her legs and some of them are painful but not all of them.  No hives on exam today.  Psychiatric: affect appropriate, memory intact.     Data Review:    Analysis and Summary of Old Records (2): Yes no    Records Requested (1): No    Other History Summarized (from other people in the room) (2): No    Radiology Tests Summarized (XRAY/CT/MRI/DXA) (1): *No    Labs Reviewed (1): Yes    Medicine Tests Reviewed (EKG/ECHO/COLONOSCOPY/EGD) (1): No    Independent Review of EKG or X-RAY (2): No

## 2021-06-16 NOTE — PROGRESS NOTES
[facility] Care Coordination    Called adult son Said to schedule annual HRA home visit. HRA has been scheduled for Tuesday, April 6 at 2:00pm.     ANJEL Kearns  [facility]  [phone]

## 2021-06-16 NOTE — TELEPHONE ENCOUNTER
Please call son.I received paperwork for back support for pt.  We have not discussed this before. Is she having back pain?  What are her symptoms?    If back pain is new, I may need to see her in the office.  If it is not new and we can try ordering back support, and if she is not better, she should have office visit.    Dr GAMBOA

## 2021-06-17 NOTE — PROGRESS NOTES
The POC sig page, PCA sig page and EMILY were received and saved in member folder.     Man Stovall  Care Management Specialist  [facility]  [phone]

## 2021-06-17 NOTE — TELEPHONE ENCOUNTER
Telephone Encounter by Stephania Santos LPN at 9/9/2020  3:08 PM     Author: Stephania Santos LPN Service: -- Author Type: Licensed Nurse    Filed: 9/9/2020  3:10 PM Encounter Date: 9/7/2020 Status: Signed    : Stephania Santos LPN (Licensed Nurse)       Medication: Tramadol  Last Date Filled 12/19/19   pulled: YES      Only PCP Prescribing? : NO  Taken as prescribed from physician notes? unknown    CSA in last year: NO  Random Utox in last year: NO  (if any of the above answer NO - schedule with PCP)     Opioids + benzodiazepines? NO  (if the above answer YES - schedule with PCP every 6 months)     Is patient on the Executive Review Team? no      All responses suggest: Scheduling with PCP for further intervention

## 2021-06-18 NOTE — PROGRESS NOTES
[facility] Follow Up Note    Assessment/Plan:    1. Dermatitis  Discussed to continue antihistamine and mometasone topical steroid  - loratadine (CLARITIN) 10 mg tablet; Take 1 tablet (10 mg total) by mouth daily.  Dispense: 30 tablet; Refill: 11  - mometasone (ELOCON) 0.1 % cream; Apply twice a day for 2 weeks, then take 1 week off. May repeat  Dispense: 50 g; Refill: 3    2. Low back pain  Patient had history of compression fracture.  She uses tramadol once or twice a week  - traMADol (ULTRAM) 50 mg tablet; TAKE ONE TABLET BY MOUTH THREE TIMES DAILY AS NEEDED FOR PAIN  Dispense: 30 tablet; Refill: 1  - HM2(CBC w/o Differential)    3.  History of osteoporosis  Patient had compression fraction in 2015 seen on x-ray.  She was started on Fosamax and completed about 2 years.  Unclear why she stopped.  We will repeat vitamin D levels and bone density.  Discussed with son that most likely she will need to go back on osteoporosis medication  - DXA Bone Density Scan; Future  - Vitamin D, Total (25-Hydroxy)    4. Breast pain, left  This is chronic and patient had x-rays done of her chest but no recent mammographic evaluation.  Discussed that we will do breast imaging  - Mammo Diagnostic Bilateral; Future  - US Breast Limited (Focal) Left; Future    5. Hypertension  Controlled on amlodipine  - Basic Metabolic Panel    Anjelica Kapadia MD    Chief Complaint:  Chief Complaint   Patient presents with     Travel Consult     [facility] leaving 6/15 x 1 month     Pruritus     left leg       History of Present Illness:  Radha is a 87 y.o. female   is originally from [place] who is currently here with her son who is interpreting..  She has history of osteoporosis, chronic low back pain, vitamin D deficiency, dementia (per chart), acid reflux, neuropathy, history of influenza in 2017, normal stress test in 2014, cataracts, positive QuantiFERON test (negative chest x-ray.  This appointment initially was made as a travel  consult however patient currently reports that she canceled her plans.    Patient complains about ongoing itchy skin in her legs.  She did see a dermatologist who diagnosed her with nummular dermatitis.  Is asked her to take Zyrtec and gave her mometasone cream.  Cream has helped but she currently ran out of it.  Instead of Zyrtec she does take Claritin daily.  On exam there is no cellulitis or open areas but some excoriation and mild skin changes due to excoriation on her lower extremities.  No edema.  Discussed refill on topical steroid and how to use it.    Patient does have low back pain and takes tramadol intermittently.  She reports that she takes that once or twice a week.  Refill was provided.    Patient has a history of osteoporosis.  She had compression fracture in 2015 and was started on Fosamax at that time.  It appears that she might have completed 2 years of it and stopped for some reason.  Son does not know if she had side effects.  Currently she denies any falls.  She does have cataracts and was recommended to have them repaired by ophthalmologist but does not want to.  Discussed that we will check her vitamin D levels and repeat bone density testing.  Most likely she will need to go back on Fosamax.  Currently she just takes multivitamin.  She does not take vitamin D thousand units anymore.    Review of Systems:  A comprehensive review of systems was performed and was otherwise negative    PFSH:  Social History: Reviewed  History   Smoking Status     Never Smoker   Smokeless Tobacco     Never Used     Social History     Social History Narrative    Patient lives by herself but does have PCA who helps with cleaning and cooking.  She does have 2 sons in the area.  She is originally from [facility].       Past History: Reviewed  Current Outpatient Prescriptions   Medication Sig Dispense Refill     acetaminophen (TYLENOL) 500 MG tablet Take 500-1,000 mg by mouth.       amLODIPine (NORVASC) 5 MG tablet TAKE  "ONE TABLET BY MOUTH ONE TIME DAILY 90 tablet 2     fluticasone (FLONASE) 50 mcg/actuation nasal spray 2 sprays into each nostril daily. 16 g 11     lactose-reduced food with fibr Liqd Take 1 Bottle by mouth 2 (two) times a day as needed. (Patient taking differently: Take 1 Bottle by mouth as needed. ) 60 Bottle 6     loratadine (CLARITIN) 10 mg tablet Take 1 tablet (10 mg total) by mouth daily. 30 tablet 11     mometasone (ELOCON) 0.1 % cream Apply twice a day for 2 weeks, then take 1 week off. May repeat 50 g 3     nut.tx.gluc.intol,lac-free,soy (GLUCERNA SHAKE) Liqd DRINK ONE BOTTLE BY MOUTH TWICE DAILY AS NEEDED 73751 mL 4     olopatadine (PATANOL) 0.1 % ophthalmic solution Administer 1 drop to both eyes 2 (two) times a day. 5 mL 1     omeprazole (PRILOSEC) 20 MG capsule TAKE ONE CAPSULE BY MOUTH ONE TIME DAILY 90 capsule 2     psyllium (KONSYL) Powd Take by mouth as needed.        senna-docusate (SENOKOT-S) 8.6-50 mg tablet Take 1 tablet by mouth daily. 30 tablet 6     traMADol (ULTRAM) 50 mg tablet TAKE ONE TABLET BY MOUTH THREE TIMES DAILY AS NEEDED FOR PAIN 30 tablet 1     No current facility-administered medications for this visit.        Family History: Reviewed    Physical Exam:    Vitals:    06/05/18 1512   BP: 120/72   Patient Site: Left Arm   Patient Position: Sitting   Cuff Size: Adult Regular   Pulse: 78   Resp: 16   SpO2: 96%   Weight: 139 lb 8 oz (63.3 kg)   Height: 5' 6\" (1.676 m)     Wt Readings from Last 3 Encounters:   06/05/18 139 lb 8 oz (63.3 kg)   03/14/18 144 lb 6.4 oz (65.5 kg)   01/25/18 142 lb 3.2 oz (64.5 kg)     Body mass index is 22.52 kg/(m^2).    Constitutional:  Reveals a pleasant  female who looks younger her stated age.  Vitals:  Per nursing notes.  HEENT:No cervical LAD, no thyromegaly,  conjunctiva is pink, no scleral icterus, TMs are visualized and normal bl, oropharynx is clear, no exudates, mild posterior nasopharyngeal cobblestoning noted.  Cardiac:  Regular rate and " rhythm,no murmurs, rubs, or gallops.  Legs without edema. Palpation of the radial pulse regular.  Lungs: Clear to auscultation bl.  Respiratory effort normal.  Abdomen:positive BS, soft, nontender, nondistended.  No hepato-splenomagaly  Skin:   Without rash, bruise, or palpable lesions.  There is mild excoriation in her lower extremities without discrete rash.  No lower extremity edema.  Rheumatologic: Normal joints and nails of the hands.  Neurologic:  Cranial nerves II-XII intact.     Psychiatric: affect appropriate, memory intact.   Breast exam: Patient does have tenderness to palpation of the left median breast without discrete palpable mass but she does have mild increased in density in that area.  Right breast exam is normal.  Sternum is nontender to palpation.    Data Review:    Analysis and Summary of Old Records (2): Yes    Records Requested (1): No    Other History Summarized (from other people in the room) (2): Yes son    Radiology Tests Summarized (XRAY/CT/MRI/DXA) (1): Yes previous back x-ray    Labs Reviewed (1): Yes    Medicine Tests Reviewed (EKG/ECHO/COLONOSCOPY/EGD) (1): No    Independent Review of EKG or X-RAY (2): No

## 2021-06-19 NOTE — LETTER
Letter by Anjelica Kapadia MD at      Author: Anjelica Kapadia MD Service: -- Author Type: --    Filed:  Encounter Date: 12/1/2019 Status: Signed         Radha Mcmanus  [address]  [place] [address]             December 1, 2019         Dear Ms. Mcmanus,    Below are the results from your recent visit:    Vitamin D level is in good range, continue current supplement.  Kidney function and red cell count are normal.  Face swelling can be due to lack of head elevation at night or too much salt in diet.  Resulted Orders   Vitamin D, Total (25-Hydroxy)   Result Value Ref Range    Vitamin D, Total (25-Hydroxy) 43.2 30.0 - 80.0 ng/mL    Narrative    Deficiency <10.0 ng/mL  Insufficiency 10.0-29.9 ng/mL  Sufficiency 30.0-80.0 ng/mL  Toxicity (possible) >100.0 ng/mL   Basic Metabolic Panel   Result Value Ref Range    Sodium 140 136 - 145 mmol/L    Potassium 3.7 3.5 - 5.0 mmol/L    Chloride 104 98 - 107 mmol/L    CO2 27 22 - 31 mmol/L    Anion Gap, Calculation 9 5 - 18 mmol/L    Glucose 102 70 - 125 mg/dL    Calcium 9.3 8.5 - 10.5 mg/dL    BUN 11 8 - 28 mg/dL    Creatinine 0.75 0.60 - 1.10 mg/dL    GFR MDRD Af Amer >60 >60 mL/min/1.73m2    GFR MDRD Non Af Amer >60 >60 mL/min/1.73m2    Narrative    Fasting Glucose reference range is 70-99 mg/dL per  American Diabetes Association (ADA) guidelines.   HM2(CBC w/o Differential)   Result Value Ref Range    WBC 3.9 (L) 4.0 - 11.0 thou/uL    RBC 4.38 3.80 - 5.40 mill/uL    Hemoglobin 13.2 12.0 - 16.0 g/dL    Hematocrit 39.3 35.0 - 47.0 %    MCV 90 80 - 100 fL    MCH 30.0 27.0 - 34.0 pg    MCHC 33.5 32.0 - 36.0 g/dL    RDW 12.5 11.0 - 14.5 %    Platelets 140 140 - 440 thou/uL    MPV 8.6 7.0 - 10.0 fL       Please call with questions or contact us using [employer].    Sincerely,      Electronically signed by Anjelica Kapadia MD

## 2021-06-19 NOTE — LETTER
Letter by Anjelica Kapadia MD at      Author: Anjelica Kapadia MD Service: -- Author Type: --    Filed:  Encounter Date: 9/2/2019 Status: (Other)         Radha Mcmanus  [address]  [place] [address]             September 2, 2019         Dear Ms. Mcmanus,    Below are the results from your recent visit:    Kidney, liver, thyroid functions and sugar are normal.  Cholesterol and red cell counts are normal.  Urine did not show infection.    Please let me know  If headache is not better after completing antibiotic and steroid.    Since EKG did show some abnormalities and you have been experiencing exertional chest pains, please schedule a  Stress test to make sure there are no blockages in the heart.  Resulted Orders   Urinalysis-UC if Indicated   Result Value Ref Range    Color, UA Yellow Colorless, Yellow, Straw, Light Yellow    Clarity, UA Clear Clear    Glucose, UA Negative Negative    Bilirubin, UA Negative Negative    Ketones, UA Negative Negative    Specific Gravity, UA 1.015 1.005 - 1.030    Blood, UA Small (!) Negative    pH, UA 7.0 5.0 - 8.0    Protein, UA Negative Negative mg/dL    Urobilinogen, UA 0.2 E.U./dL 0.2 E.U./dL, 1.0 E.U./dL    Nitrite, UA Negative Negative    Leukocytes, UA Negative Negative    Bacteria, UA Few (!) None Seen hpf    RBC, UA 0-2 None Seen, 0-2 hpf    WBC, UA None Seen None Seen, 0-5 hpf    Squam Epithel, UA None Seen None Seen, 0-5 lpf    Narrative    UC not indicated   Comprehensive Metabolic Panel   Result Value Ref Range    Sodium 142 136 - 145 mmol/L    Potassium 3.6 3.5 - 5.0 mmol/L    Chloride 104 98 - 107 mmol/L    CO2 27 22 - 31 mmol/L    Anion Gap, Calculation 11 5 - 18 mmol/L    Glucose 98 70 - 125 mg/dL    BUN 13 8 - 28 mg/dL    Creatinine 0.75 0.60 - 1.10 mg/dL    GFR MDRD Af Amer >60 >60 mL/min/1.73m2    GFR MDRD Non Af Amer >60 >60 mL/min/1.73m2    Bilirubin, Total 0.7 0.0 - 1.0 mg/dL    Calcium 9.3 8.5 - 10.5 mg/dL    Protein, Total 7.6 6.0 - 8.0 g/dL     Albumin 3.8 3.5 - 5.0 g/dL    Alkaline Phosphatase 77 45 - 120 U/L    AST 21 0 - 40 U/L    ALT 14 0 - 45 U/L    Narrative    Fasting Glucose reference range is 70-99 mg/dL per  American Diabetes Association (ADA) guidelines.   LDL Cholesterol, Direct   Result Value Ref Range    Direct  <=129 mg/dl   Thyroid Stimulating Hormone (TSH)   Result Value Ref Range    TSH 3.65 0.30 - 5.00 uIU/mL   Troponin I   Result Value Ref Range    Troponin I <0.01 0.00 - 0.29 ng/mL   HM1 (CBC with Diff)   Result Value Ref Range    WBC 3.8 (L) 4.0 - 11.0 thou/uL    RBC 4.41 3.80 - 5.40 mill/uL    Hemoglobin 13.3 12.0 - 16.0 g/dL    Hematocrit 40.1 35.0 - 47.0 %    MCV 91 80 - 100 fL    MCH 30.2 27.0 - 34.0 pg    MCHC 33.2 32.0 - 36.0 g/dL    RDW 11.7 11.0 - 14.5 %    Platelets 159 140 - 440 thou/uL    MPV 7.9 7.0 - 10.0 fL    Neutrophils % 40 (L) 50 - 70 %    Lymphocytes % 46 (H) 20 - 40 %    Monocytes % 9 2 - 10 %    Eosinophils % 5 0 - 6 %    Basophils % 1 0 - 2 %    Neutrophils Absolute 1.5 (L) 2.0 - 7.7 thou/uL    Lymphocytes Absolute 1.8 0.8 - 4.4 thou/uL    Monocytes Absolute 0.3 0.0 - 0.9 thou/uL    Eosinophils Absolute 0.2 0.0 - 0.4 thou/uL    Basophils Absolute 0.0 0.0 - 0.2 thou/uL   Erythrocyte Sedimentation Rate   Result Value Ref Range    Sed Rate 19 0 - 20 mm/hr       Please call with questions or contact us using [employer].    Sincerely,    Electronically signed by Anjelica Kapadia MD

## 2021-06-19 NOTE — LETTER
Letter by Lyn Erazo SW at      Author: Lyn Erazo SW Service: -- Author Type: --    Filed:  Encounter Date: 6/19/2019 Status: (Other)       June 24, 2019      LASHAY FABIAN  [address]  [place] [address]      Dear Lashay:    At [facility], we are dedicated to improving your health and well-being. Enclosed is the Comprehensive Care Plan that we developed with you on 6/13/19. Please review the Care Plan carefully.    As a reminder, some of the things we discussed at your visit include:    Your physical and mental health    Ways to reduce falls    Health care needs you may have    Dont forget to contact your care coordinator if you:    Have been hospitalized or plan to be hospitalized     Have had a fall     Have experienced a change in physical health    Are experiencing emotional problems     If you do not agree with your Care Plan, have questions about it, or have experienced a change in your needs, please call me at [phone]. If you are hearing impaired, please call the [place] Relay at [phone] or [phone] (vsyugt-th-[email or web address] relay service).    Sincerely,      ANJEL Kearns    E-mail: [email or web address]  [phone]      [facility] (hospitals) is a health plan that contracts with both Medicare and the [place] Medical Assistance (Medicaid) program to provide benefits of both programs to enrollees. Enrollment in [facility] depends on contract renewal.    [id number])     [id number] (11/18)

## 2021-06-19 NOTE — LETTER
Letter by Lyn Erazo SW at      Author: Lyn Erazo SW Service: -- Author Type: --    Filed:  Encounter Date: 6/19/2019 Status: (Other)       [address], [address]  [place] [address]  Phone:  [phone]  Fax:  [phone]        June 19, 2019    LASHAY FABIAN  [address]  [place] [address]    Dear Ro Garcia is a copy of your completed PCA Assessment and Service Plan.  This is for your records and no action is required by you.  If you have additional questions regarding your assessment please contact me at [phone]. If you feel that your needs are not being met, please contact the Clinical Supervisor at [phone].    Sincerely,      ANJEL Kearns    E-mail: [email or web address]  [phone]      [facility]            Enclosure:  Completed PCA assessment

## 2021-06-20 NOTE — LETTER
Letter by Anjelica Kapadia MD at      Author: Anjelica Kapadia MD Service: -- Author Type: --    Filed:  Encounter Date: 9/30/2020 Status: (Other)         Radha Mcmanus  [address]  [place] [address]             September 30, 2020         Dear Ms. Mcmanus,    Below are the results from your recent visit:    Kidney and liver functions are normal.  Cholesterol is at goal.  Vitamin d level is good, continue current supplement.   Red cell count is normal.    Sugar level was slightly elevated. Did you fast 8 hours before your blood work? If yes, then we will need additional blood work to check for diabetes ( put order in, please schedule lab only appoitnement, no fasting needed).. If blood work was done within 2-4 hours of you eating, then elevated sugar is normal.      Resulted Orders   Comprehensive Metabolic Panel   Result Value Ref Range    Sodium 141 136 - 145 mmol/L    Potassium 3.6 3.5 - 5.0 mmol/L    Chloride 104 98 - 107 mmol/L    CO2 25 22 - 31 mmol/L    Anion Gap, Calculation 12 5 - 18 mmol/L    Glucose 168 (H) 70 - 125 mg/dL    BUN 15 8 - 28 mg/dL    Creatinine 0.96 0.60 - 1.10 mg/dL    GFR MDRD Af Amer >60 >60 mL/min/1.73m2    GFR MDRD Non Af Amer 55 (L) >60 mL/min/1.73m2    Bilirubin, Total 0.5 0.0 - 1.0 mg/dL    Calcium 9.5 8.5 - 10.5 mg/dL    Protein, Total 7.5 6.0 - 8.0 g/dL    Albumin 4.0 3.5 - 5.0 g/dL    Alkaline Phosphatase 79 45 - 120 U/L    AST 21 0 - 40 U/L    ALT 14 0 - 45 U/L    Narrative    Fasting Glucose reference range is 70-99 mg/dL per  American Diabetes Association (ADA) guidelines.   Lipid [place] FASTING   Result Value Ref Range    Cholesterol 172 <=199 mg/dL    Triglycerides 66 <=149 mg/dL    HDL Cholesterol 61 >=50 mg/dL    LDL Calculated 98 <=129 mg/dL    Patient Fasting > 8hrs? Unknown    Vitamin D, Total (25-Hydroxy)   Result Value Ref Range    Vitamin D, Total (25-Hydroxy) 42.3 30.0 - 80.0 ng/mL    Narrative    Deficiency <10.0 ng/mL  Insufficiency 10.0-29.9  ng/mL  Sufficiency 30.0-80.0 ng/mL  Toxicity (possible) >100.0 ng/mL   HM1 (CBC with Diff)   Result Value Ref Range    WBC 4.5 4.0 - 11.0 thou/uL    RBC 4.51 3.80 - 5.40 mill/uL    Hemoglobin 13.6 12.0 - 16.0 g/dL    Hematocrit 40.8 35.0 - 47.0 %    MCV 91 80 - 100 fL    MCH 30.1 27.0 - 34.0 pg    MCHC 33.3 32.0 - 36.0 g/dL    RDW 12.6 11.0 - 14.5 %    Platelets 157 140 - 440 thou/uL    MPV 9.0 7.0 - 10.0 fL    Neutrophils % 48 (L) 50 - 70 %    Lymphocytes % 41 (H) 20 - 40 %    Monocytes % 6 2 - 10 %    Eosinophils % 5 0 - 6 %    Basophils % 1 0 - 2 %    Neutrophils Absolute 2.2 2.0 - 7.7 thou/uL    Lymphocytes Absolute 1.8 0.8 - 4.4 thou/uL    Monocytes Absolute 0.3 0.0 - 0.9 thou/uL    Eosinophils Absolute 0.2 0.0 - 0.4 thou/uL    Basophils Absolute 0.0 0.0 - 0.2 thou/uL           Please call with questions or contact us using [employer].    Sincerely,        Electronically signed by Anjelica Kapadia MD

## 2021-06-20 NOTE — LETTER
Letter by Lyn Erazo SW at      Author: Lyn Erazo SW Service: -- Author Type: --    Filed:  Encounter Date: 5/13/2020 Status: (Other)       [address], [address]  [place] [address]  Phone:  [phone]  Fax:  [phone]        May 13, 2020    LASHAY FABIAN  [address]  [place] [address]    Dear Ro Garcia is a copy of your completed PCA Assessment and Service Plan.  This is for your records and no action is required by you.  If you have additional questions regarding your assessment please contact me at [phone]. If you feel that your needs are not being met, please contact the Clinical Supervisor at [phone].    Sincerely,      ANJEL Kearns    E-mail: [email or web address]  [phone]      [facility]            Enclosure:  Completed PCA assessment

## 2021-06-20 NOTE — LETTER
Letter by Lyn Erazo SW at      Author: Lyn Erazo SW Service: -- Author Type: --    Filed:  Encounter Date: 5/13/2020 Status: (Other)       May 13, 2020      LASHAY FABIAN  [address]  [place] [address]      Dear Lashay:    At [facility], we are dedicated to improving your health and well-being. Enclosed is the Comprehensive Care Plan that we developed with you on 5/8/20. Please review the Care Plan carefully.    As a reminder, some of the things we discussed at your visit include:    Your physical and mental health    Ways to reduce falls    Health care needs you may have    Dont forget to contact your care coordinator if you:    Have been hospitalized or plan to be hospitalized     Have had a fall     Have experienced a change in physical health    Are experiencing emotional problems     If you do not agree with your Care Plan, have questions about it, or have experienced a change in your needs, please call me at [phone]. If you are hearing impaired, please call the [place] [employer]ay at [phone] or [phone] (ynotyk-zm-bgbojf relay service).    Sincerely,      ANJEL Kearns    E-mail: [email or web address]  [phone]      [facility] ([facility]) is a health plan that contracts with both Medicare and the [place] Medical Assistance (Medicaid) program to provide benefits of both programs to enrollees. Enrollment in [facility] depends on contract renewal.    [id number]     [id number] (11/18)

## 2021-06-20 NOTE — PROGRESS NOTES
ASSESSMENT AND PLAN:    1. Sleep disorder  A chronic fitfulness.  I suspect part of chronic anxiety disorder. She is resistive to considering treatment.  There may be underlying depression and/or PSTD.  Son is present. She tried ambien and didn't like it.  Will try trazodone 25mg po at hs.  Hopefully, she can continue this and get added benefit from treatment of anxiety and depression.     2. Anxiety Disorder NOS  See above.      3. Headache syndrome  This is chronic, as is her left chest/breast pain, and positional vertigo symptoms.  On exam there appears to be no change.  Suspect somatization related to anxiety.     CHIEF COMPLAINT:  Chief Complaint   Patient presents with     Headache     HISTORY OF PRESENT ILLNESS:  Radha Mcmanus is a 87 y.o. female with a sense bifrontal headache, some nausea without emesis, no diarrhea.  Also chronic complaints of vertigo with position change, and left breast/chest pain. She has a breast US planned.  No fever, or sore throat or cough.  No symptoms of bleeding or increased dyspnea.  Does report fitful sleep, awakening early and difficulty falling asleep. This is not new.  History and interpretation are by her son, today.  She has tried ambien and did not like that medication.     REVIEW OF SYSTEMS:   See HPI, all other pertinent systems on review are negative.    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     Resolved Ambulatory Problems     Diagnosis Date Noted     Sinusitis      Epistaxis      Pain During Urination (Dysuria)      Acute Sinusitis       Cough      Chronic Allergic Conjunctivitis      Urge Incontinence Of Urine      Throat pain      Costochondritis (Tietze's Syndrome)      Chest Pain      Abnormal Electrocardiogram      No Additional Past Medical History     VITALS:  Vitals:    09/17/18 1330   BP: 126/62   Patient Site: Left Arm   Patient Position: Sitting   Cuff Size: Adult Regular   Pulse: 78   Weight: 140 lb (63.5 kg)     Wt Readings from Last 3 Encounters:   09/17/18 140 lb (63.5 kg)   06/05/18 139 lb 8 oz (63.3 kg)   03/14/18 144 lb 6.4 oz (65.5 kg)     PHYSICAL EXAM:  Constitutional:  Well appearing in NAD, alert and oriented  Ears:  Clear.  Oropharynx:  No significant erythema  Nose: clear  Neck:  Supple, no significant adenopathy, there is some bilateral paracervical muscle spasm that is tender, but mildly so.  Cardiac:  S1 S2 without murmur, rhythm regular  Lungs: Clear to auscultation  Abdomen:   Soft, flat and non-tender, without guarding, rebound, or mass.    Extremities: Joints without effusion or inflammation, distal pulses diminished, minor edema  Neurologic:  Speech clear, motor  intact, gait is aged but normal     Psychiatric:  Worried, subdued, alert.      DECISION TO OBTAIN OLD RECORDS AND/OR OBTAIN HISTORY FROM SOMEONE OTHER THAN PATIENT, AND/OR ACCESSING CARE EVERYWHERE):  1  Reviewed previous outpatient care records.      REVIEW AND SUMMARIZATION OF OLD RECORDS, AND/OR OBTAINING HISTORY FROM SOMEONE OTHER THAN PATIENT, AND/OR DISCUSSION OF CASE WITH ANOTHER HEALTH CARE PROVIDER:  2 0    REVIEW AND/OR ORDER OF OF CLINICAL LAB TESTS: 1  0.    REVIEW AND/OR ORDER OF RADIOLOGY TESTS: 1 0.    REVIEW AND/OR ORDER OF MEDICAL TESTS (EKG/ECHO/COLONOSCOPY/EGD): 1  0    INDEPENDENT  VISUALIZATION OF IMAGE, TRACING, OR SPECIMEN ITSELF (2 EACH):  0     TOTAL: 1    Current Outpatient Prescriptions   Medication Sig Dispense Refill     acetaminophen (TYLENOL) 500 MG tablet Take 500-1,000 mg by mouth.       amLODIPine (NORVASC) 5 MG tablet TAKE  ONE TABLET BY MOUTH ONE TIME DAILY 90 tablet 2     fluticasone (FLONASE) 50 mcg/actuation nasal spray 2 sprays into each nostril daily. 16 g 11     lactose-reduced food with fibr Liqd Take 1 Bottle by mouth 2 (two) times a day as needed. (Patient taking differently: Take 1 Bottle by mouth as needed. ) 60 Bottle 6     loratadine (CLARITIN) 10 mg tablet Take 1 tablet (10 mg total) by mouth daily. 30 tablet 11     mometasone (ELOCON) 0.1 % cream Apply twice a day for 2 weeks, then take 1 week off. May repeat 50 g 3     nut.tx.gluc.intol,lac-free,soy (GLUCERNA SHAKE) Liqd DRINK ONE BOTTLE BY MOUTH TWICE DAILY AS NEEDED 67620 mL 4     omeprazole (PRILOSEC) 20 MG capsule Take 1 capsule (20 mg total) by mouth daily. 90 capsule 1     psyllium (KONSYL) Powd Take by mouth as needed.        senna-docusate (SENOKOT-S) 8.6-50 mg tablet Take 1 tablet by mouth daily. 30 tablet 6     traMADol (ULTRAM) 50 mg tablet TAKE ONE TABLET BY MOUTH THREE TIMES DAILY AS NEEDED FOR PAIN 30 tablet 1     traZODone (DESYREL) 50 MG tablet Take 0.5 tablets (25 mg total) by mouth at bedtime. 15 tablet 5     No current facility-administered medications for this visit.      Luis Escamilla MD  Internal Medicine  [facility]

## 2021-06-20 NOTE — LETTER
Letter by Anjelica Kapadia MD at      Author: Anjelica Kapadia MD Service: -- Author Type: --    Filed:  Encounter Date: 1/7/2020 Status: Signed         Radha Mcmanus  [address]  [place] [address]             January 7, 2020         Dear Ms. Mcmanus,    Below are the results from your recent visit:    Potassium level and kidney functions are normal.  Continue losartan for b/p control. Let me know how b/p readings are at home (goal <130).    Resulted Orders   Basic Metabolic Panel   Result Value Ref Range    Sodium 141 136 - 145 mmol/L    Potassium 3.9 3.5 - 5.0 mmol/L    Chloride 101 98 - 107 mmol/L    CO2 31 22 - 31 mmol/L    Anion Gap, Calculation 9 5 - 18 mmol/L    Glucose 119 70 - 125 mg/dL    Calcium 9.6 8.5 - 10.5 mg/dL    BUN 9 8 - 28 mg/dL    Creatinine 0.75 0.60 - 1.10 mg/dL    GFR MDRD Af Amer >60 >60 mL/min/1.73m2    GFR MDRD Non Af Amer >60 >60 mL/min/1.73m2    Narrative    Fasting Glucose reference range is 70-99 mg/dL per  American Diabetes Association (ADA) guidelines.       Please call with questions or contact us using [id number].    Sincerely,        Electronically signed by Anjelica Kapadia MD

## 2021-06-21 NOTE — PROGRESS NOTES
[facility] Follow Up Note    Assessment/Plan:    1. Nummular dermatitis  She will go back to using topical steroids and continue Claritin.  Discussed to minimize use of detergent and use nonallergic detergent and rinse her clothes twice.  She can also try histidine supplement.  If itching is not better recommended that she sees dermatologist and brings all of her lotions that she uses with her.  Consideration can be make to use light therapy in her.  Her symptoms always worse in the winter.    2. Itching  Due to #1.  However we will check her thyroid liver and kidney function today  - Thyroid Stimulating Hormone (TSH)  - Hepatic Profile    3.  Osteoporosis  She is on Fosamax.  Will check vitamin D levels    4. Right shoulder pain  Full range of motion on exam and no weakness.  Suspect rotator cuff tendinitis.  She refused physical therapy.  We will do an x-ray and try topical diclofenac.  - XR Shoulder Right 2 or More VWS; Future  - diclofenac sodium (VOLTAREN) 1 % Gel; Apply twice a day for shoulder pain  Dispense: 100 g; Refill: 1    5.  Insomnia.  Trazodone 25 mg has not helped, will try 50 mg.  If it does not help we can do a trial off gabapentin.  Not sure if it might be helpful with her itching as well.    Anjelica Kapadia MD    Chief Complaint:  Chief Complaint   Patient presents with     Eczema       History of Present Illness:  Radha is a 87 y.o. female who is originally from [facility] who is currently here with her son who is interpreting..  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   She is currently here for follow-up and complaint of worsening itching.    Patient developed problem with itching in the last year.  She did see dermatologist twice who  recommended her of his nummular dome dermatitis.  Initially they gave her steroids ointment and subsequently recommended Claritin.  She ran out of ointment and will be picking it up again but takes Claritin daily.  Overall she describes her itching and mild papular rash throughout her body.  There are excoriations on her body.  We went over general precautions.  Patient does take baths only every other day.  She uses Vaseline and other lotions topically to keep her skin moisturized.  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  Her diet has not changed and she has not observed any specific foods triggering her rash and itching.  Discussed that she should continue Claritin and restart steroid cream, use nonallergic detergent and arrange her closed twice, she can also try histidine supplement and if everything else fails see dermatologist again for possible light therapy.    Patient does have mild pain in her right shoulder.  She sleeps on that side.  Symptoms are mild.  She does have intact range of motion and no weakness.  Discussed that most likely tendinitis.  Patient refused physical therapy.  We will do an x-ray and try topical Voltaren.    Patient does have significant osteoporosis in her spine.  She is back on Fosamax and tolerating it well.  We will check her vitamin D levels today.    Review of Systems:  A comprehensive review of systems was performed and was otherwise negative and as per HPI.  On previous visit she was giving trazodone and has been taking 25 mg at bedtime for insomnia but it has not helped.  Discussed that she can take a full tablet.    PFSH:  Social History: Reviewed  History   Smoking Status     Never Smoker   Smokeless Tobacco     Never Used     Social History     Social History Narrative    Patient lives by herself but does have PCA who helps with cleaning  and cooking.  She does have 2 sons in the area.  She is originally from [facility].       Past History: Viewed  Current Outpatient Prescriptions   Medication Sig Dispense Refill     acetaminophen (TYLENOL) 500 MG tablet Take 500-1,000 mg by mouth.       alendronate (FOSAMAX) 70 MG tablet Take 1 tablet (70 mg total) by mouth every 7 days. Take with large glass of water and do not lie down for 2 hours after taking it. 4 tablet 11     amLODIPine (NORVASC) 5 MG tablet TAKE ONE TABLET BY MOUTH ONE TIME DAILY 90 tablet 2     fluticasone (FLONASE) 50 mcg/actuation nasal spray 2 sprays into each nostril daily. 16 g 11     lactose-reduced food with fibr Liqd Take 1 Bottle by mouth 2 (two) times a day as needed. (Patient taking differently: Take 1 Bottle by mouth as needed. ) 60 Bottle 6     loratadine (CLARITIN) 10 mg tablet Take 1 tablet (10 mg total) by mouth daily. 30 tablet 11     mometasone (ELOCON) 0.1 % cream Apply twice a day for 2 weeks, then take 1 week off. May repeat 50 g 3     nut.tx.gluc.intol,lac-free,soy (GLUCERNA SHAKE) Liqd DRINK ONE BOTTLE BY MOUTH TWICE DAILY AS NEEDED 56742 mL 4     omeprazole (PRILOSEC) 20 MG capsule Take 1 capsule (20 mg total) by mouth daily. 90 capsule 1     psyllium (KONSYL) Powd Take by mouth as needed.        senna-docusate (SENOKOT-S) 8.6-50 mg tablet Take 1 tablet by mouth daily. 30 tablet 6     traMADol (ULTRAM) 50 mg tablet TAKE ONE TABLET BY MOUTH THREE TIMES DAILY AS NEEDED FOR PAIN 30 tablet 1     traZODone (DESYREL) 50 MG tablet Take 0.5 tablets (25 mg total) by mouth at bedtime. 15 tablet 5     diclofenac sodium (VOLTAREN) 1 % Gel Apply twice a day for shoulder pain 100 g 1     No current facility-administered medications for this visit.        Family History: Reviewed    Physical Exam:    Vitals:    10/23/18 1432   BP: 116/78   Patient Site: Left Arm   Patient Position: Sitting   Cuff Size: Adult Regular   Pulse: 88   Resp: 16   Weight: 142 lb 12.8 oz (64.8 kg)     Wt  Readings from Last 3 Encounters:   10/23/18 142 lb 12.8 oz (64.8 kg)   09/17/18 140 lb (63.5 kg)   06/05/18 139 lb 8 oz (63.3 kg)     Body mass index is 23.05 kg/(m^2).    Constitutional:  Reveals a pleasant female.  Vitals:  Per nursing notes.  HEENT:No cervical LAD, no thyromegaly,  conjunctiva is pink, no scleral icterus, TMs are visualized and normal bl, oropharynx is clear, no exudates, mild cobblestoning on posterior pharynx noted  Cardiac:  Regular rate and rhythm,no murmurs, rubs, or gallops.Legs without edema. Palpation of the radial pulse regular.  Lungs: Clear to auscultation bl.  Respiratory effort normal.  No wheezes or rales  Abdomen:positive BS, soft, nontender, nondistended.  No hepato-splenomagaly  Skin: Excoriations throughout especially in the lower extremity and left buttock, mild sporadic macular lesions but no hives.  Rheumatologic: Mild arthritic changes in her hands  Neurologic:  Cranial nerves II-XII intact.     Psychiatric: affect appropriate, memory intact.     Data Review:    Analysis and Summary of Old Records (2): Yes    Records Requested (1): No    Other History Summarized (from other people in the room) (2): Yes son    Radiology Tests Summarized (XRAY/CT/MRI/DXA) (1): Bone density    Labs Reviewed (1): Yes    Medicine Tests Reviewed (EKG/ECHO/COLONOSCOPY/EGD) (1): No    Independent Review of EKG or X-RAY (2): No

## 2021-06-21 NOTE — LETTER
Letter by Lyn Erazo SW at      Author: Lyn Erazo SW Service: -- Author Type: --    Filed:  Encounter Date: 4/15/2021 Status: (Other)       April 15, 2021      LASHAY FABIAN  [address]  [place] [address]      Dear Lashay:    At [facility], we are dedicated to improving your health and well-being. Enclosed is the Comprehensive Care Plan that we developed with you on 04/06/2021. Please review the Care Plan carefully.    As a reminder, some of the things we discussed at your visit include:    Your physical and mental health    Ways to reduce falls    Health care needs you may have    Dont forget to contact your care coordinator if you:    Have been hospitalized or plan to be hospitalized     Have had a fall     Have experienced a change in physical health    Are experiencing emotional problems     If you do not agree with your Care Plan, have questions about it, or have experienced a change in your needs, please call me at [phone]. If you are hearing impaired, please call the [place] [employer] at 059 or [phone] ([email or web address] relay service).    Sincerely,      ANJEL Kearns    E-mail: [email or web address]  [phone]      [facility] ([facility]) is a health plan that contracts with both Medicare and the [place] Medical Assistance (Medicaid) program to provide benefits of both programs to enrollees. Enrollment in [facility] depends on contract renewal.    [id number]     [id number] (11/18)

## 2021-06-22 NOTE — PROGRESS NOTES
[facility] Certification Request    January 8, 2019      Patient: Radha Mcmanus  MR Number: [id number]  YOB: 1931  Date of Visit: 1/8/2019      Dear Dr. Owen Fisher:    Thank you for this referral.   We are seeing Radha Mcmanus in Occupational Therapy for vertigo.    Medicare and/or Medicaid requires physician review and approval of the treatment plan. Please review the plan of care and verify that you agree with the therapy plan of care by co-signing this note.      Plan of Care  Authorization / Certification Start Date: 01/08/19  Authorization / Certification End Date: 03/09/19  Authorization / Certification Number of Visits: 8  Communication with: Referral Source;Patient Caregiver  Patient Related Instruction: Nature of Condition;Treatment plan and rationale;Basis of treatment;Expected outcome  Times per Week: 1  Number of Weeks: 8  Number of Visits: 8  Neuromuscular Reeducation: vestibular  Canolith Repostioning:      Goals:  Patient will be able to walk: for community mobility;without loss of balance;in 8 weeks  Patient able to perform bed mobility: without dizziness;in 8 weeks  Patient will turn head: without dizziness;for conversation;in 8 weeks      If you have any questions or concerns, please don't hesitate to call.    Sincerely,      Karen Ny, OT        Physician recommendation:     ___ Follow therapist's recommendation        ___ Modify therapy      *Physician co-signature indicates they certify the need for these services furnished within this plan and while under their care.      [facility]   Vestibular Initial Evaluation    Patient Name: Radha Mcmanus  Date of evaluation: 1/8/2019  Referral Diagnosis: BPPV  Referring provider: Owen Fisher MD  Visit Diagnosis:     ICD-10-CM    1. Benign paroxysmal positional vertigo of right ear H81.11    2. Vertigo R42    3. Unsteadiness on feet R26.81    4. Decreased activities of daily living (ADL) R68.89         Assessment:      Patient has positive right Benson - Hallpikes. Treated with right Epley maneuver. Plan to see back if symptoms persist.    Goals:  Patient will be able to walk: for community mobility;without loss of balance;in 8 weeks  Patient able to perform bed mobility: without dizziness;in 8 weeks  Patient will turn head: without dizziness;for conversation;in 8 weeks      Patient's expectations/goals are realistic.    Barriers to Learning or Achieving Goals:  Language barriers.       Plan / Patient Instructions:        Plan of Care:   Authorization / Certification Start Date: 19  Authorization / Certification End Date: 19  Authorization / Certification Number of Visits: 8  Communication with: Referral Source;Patient Caregiver  Patient Related Instruction: Nature of Condition;Treatment plan and rationale;Basis of treatment;Expected outcome  Times per Week: 1  Number of Weeks: 8  Number of Visits: 8  Neuromuscular Reeducation: vestibular  Canolith Repostioning:        Plan for next visit: repeat positional tests     Subjective:         History of Present Illness:    Radha is a 88 y.o. female who presents to therapy today with complaints of vertigo and unsteadiness. Patient had sudden onset of symptoms in 2018. Symptoms are not improving. She denies history of similar symptoms. Patient denies ear pain. Patient denies hearing changes. Functional limitations are described as occurring with balance, bending, bed mobility, head turns.    Pain Ratin         Objective:      Note: Items left blank indicates the item was not performed or not indicated at the time of the evaluation.    Patient Outcome Measures:  Dizziness Handicap Inventory  Score in %: 4       Vestibular Disorder Examination  1. Benign paroxysmal positional vertigo of right ear     2. Vertigo     3. Unsteadiness on feet     4. Decreased activities of daily living (ADL)       Precautions/Restrictions: None  Posture Observation:       General standing posture is fair.    ROM:  Not Tested    Strength: Not Tested    Functional Mobility: fair      Oculomotor Assessment: No spontaneous nystagmus. No gaze evoked nystagmus. Normal saccades. Saccadic intrusions on smooth pursuit.    VOR Function: NT    Positional Tests:  Hallpike Right:  Abnormal - Nystagmus right torsional, up beating, unable to determine latency or fatigue as patient couln't keep her eyes open  Hallpike Left:  Negative, but patient was unable to keep eyes open.    Balance Assessment: will test next visit    Treatment Today: Treated with right Epley maneuver X2.  TREATMENT MINUTES COMMENTS   Evaluation 20    Self-care/ Home management     Neuromuscular Re-education     Canalith repositioning procedure 15          Total 35    Blank areas are intentional and mean the treatment did not include these items.     GOALS AND PLAN OF CARE WERE ESTABLISHED IN COOPERATION WITH THE PATIENT    OT Evaluation Code: (Please list factors)   Comorbidities: anxiety, HTN, osteoporosis  Profile/History Review: Brief    Need for eval modification: No     # Treatment options: Limited    Clinical Decision Making:  Low      Occupational Profile/ Medical and Therapy History and Comorbidities Occupational Performance Clinical Decision Making   (Complexity)   brief history with review of medical/therapy records related to the presenting problem.  No comorbidities 1-3 Performance deficits that result in activity limitations and/or participation restrictions.    No Assessment Modification  Low complexity, which includes  problem-focused assessments, and consideration of a limited number of treatment options.      expanded review of medical/therapy records and additional review of physical, cognitive and psychosocial history.    May have comorbidities 3-5 Performance deficits that result in activity limitations and/or participation restrictions.    Minimal to moderate modification of assessment Moderate  complexity, which includes analysis of data from detailed assessments, and consideration of several treatment options.         Review of medical/therapy records and extensive additional review of physical, cognitive and psychosocial history.  Comorbidities affect occupational performance 5 or more Performance deficits that result in activity limitations and/or participation restrictions.    Significant modification of assessment High complexity, analysis of  Occupational profile and data,  Comprehensive assessments, multiple treatment options.            Karen Ny  1/8/2019  7:12 AM

## 2021-06-22 NOTE — PROGRESS NOTES
ASSESSMENT:  1. Benign paroxysmal positional vertigo, left  Radha reports a 4-day history of a spinning sensation with laying down, particularly with turning to the left.  Symptoms generally resolve within minutes.  Is accompanied by headache and neck tightness.  Suspect symptoms are related to benign positional vertigo.  - Ambulatory referral to PT/OT    2.  Health maintenance:  Her lab studies from her last visit with  were reviewed.    PLAN:  Patient Instructions   Benign positional vertigo    1.  Refer for repositioning maneuvers    2.  Meclizine 25 mg three times daily as needed for vertigo    3.  See if symptoms do not improve with therapy      Orders Placed This Encounter   Procedures     Ambulatory referral to PT/OT     Referral Priority:   Routine     Referral Type:   Physical Therapy or Occupational Therapy     Referral Reason:   Evaluation and Treatment     Requested Specialty:   Physical Therapy     Number of Visits Requested:   1     There are no discontinued medications.          ASSESSED PROBLEMS:  1. Benign paroxysmal positional vertigo, left  Ambulatory referral to PT/OT       CHIEF COMPLAINT:  Chief Complaint   Patient presents with     Dizziness     x 3 days     Abdominal Pain     x 3 days     Rash     hands& legs     go over lab results     Urinary Tract Infection     burns when urinates        HISTORY OF PRESENT ILLNESS:  Radha is a 87 y.o. female presenting to the clinic today for dizziness for 4 days when she lays down and complains of headache, shoulder/neck tightness, and sore throat.   She is accompanied by an .     BPPV: She is having some dizziness. She feel fine while she is sitting up. She gets dizzy like the room is spinning when she lays down and a little when she sits up from laying. This has been going on for 4 days. The dizziness lasts for a matter of seconds then resolves. When she lays on her left side it is worse. She hear bells in her ears when the  dizziness is there.     Headache: She gets a headache when she sits for over an hour. She has not tried tylenol or anything for the headaches. She is taking tramadol however.     Shoulder and neck tightness: She is getting some pain and a lot of tightness in her neck and shoulders.     Sore Throat: She has been having a dry and sore throat.     REVIEW OF SYSTEMS:   Denies vision problems  Admits to tinnitus, dry/sore throat, neck and shoulder tightness  All other systems are negative.    PFSH:  Reviewed as below.     TOBACCO USE:  Social History     Tobacco Use   Smoking Status Never Smoker   Smokeless Tobacco Never Used       VITALS:  Vitals:    12/28/18 1445   BP: 110/70   Patient Site: Left Arm   Patient Position: Sitting   Cuff Size: Adult Regular   Pulse: 90   SpO2: 98%   Weight: 142 lb 12.8 oz (64.8 kg)     Wt Readings from Last 3 Encounters:   12/28/18 142 lb 12.8 oz (64.8 kg)   10/23/18 142 lb 12.8 oz (64.8 kg)   09/17/18 140 lb (63.5 kg)     Body mass index is 23.05 kg/m .    PHYSICAL EXAM:  Constitutional:   Reveals an alert talkative elderly woman who appears somewhat uncomfortable.  Vitals: per nursing notes.  HEENT:  Ears: hearing is symetric  External canals, TMs clear.    Eyes: prominent arcus synilis with lilateral cataracts, no nystagmus EOMs full, PERRL.  Oropharynx: poor dentition, Mouth and throat clear, no thrush or exudate.  Neck:  Supple, no carotid bruits or adenopathy.  Back:  No spine or CVA pain.  Thorax:  No bony deformities.  Lungs: Clear to A&P without rales or wheezes.  Respiratory effort normal.  Cardiac:   Regular rate and rhythm, normal S1, S2, no murmur or gallop.  Abdomen:  Soft, active bowel sounds without bruits, mass, or tenderness.  Extremities:   No peripheral edema   Skin:  No jaundice, peripheral cyanosis or lesions to suggest malignancy.  Neuro: she notes a sensation of vertigo with laying down and rolling to the left, no nystagmus detected. No gross focal  deficits.  Psychiatric:  Memory intact, mood appropriate.    ADDITIONAL HISTORY SUMMARIZED (2): None.  DECISION TO OBTAIN EXTRA INFORMATION (1): None.   RADIOLOGY TESTS (1): None.  LABS (1): 10/23/18 labs reviewed   MEDICINE TESTS (1): None.  INDEPENDENT REVIEW (2 each): None.     The visit lasted a total of 18 minutes face to face with the patient. Over 50% of the time was spent counseling and educating the patient about BPPV.    IYasmany, am scribing for and in the presence of, Dr. Fisher.    IOwen, personally performed the services described in this documentation, as scribed by Yasmany Mota in my presence, and it is both accurate and complete.    Dragon dictation was used for this note.  Speech recognition errors are a possibility.    MEDICATIONS:  Current Outpatient Medications   Medication Sig Dispense Refill     acetaminophen (TYLENOL) 500 MG tablet Take 500-1,000 mg by mouth.       alendronate (FOSAMAX) 70 MG tablet Take 1 tablet (70 mg total) by mouth every 7 days. Take with large glass of water and do not lie down for 2 hours after taking it. 4 tablet 11     amLODIPine (NORVASC) 5 MG tablet Take 1 tablet (5 mg total) by mouth daily. 90 tablet 3     diclofenac sodium (VOLTAREN) 1 % Gel Apply 4 grams twice a day for shoulder pain 100 g 1     fluticasone (FLONASE) 50 mcg/actuation nasal spray 2 sprays into each nostril daily. 16 g 11     lactose-reduced food with fibr Liqd Take 1 Bottle by mouth 2 (two) times a day as needed. (Patient taking differently: Take 1 Bottle by mouth as needed. ) 60 Bottle 6     loratadine (CLARITIN) 10 mg tablet Take 1 tablet (10 mg total) by mouth daily. 30 tablet 11     mometasone (ELOCON) 0.1 % cream Apply twice a day for 2 weeks, then take 1 week off. May repeat 50 g 3     nut.tx.gluc.intol,lac-free,soy (GLUCERNA SHAKE) Liqd DRINK ONE BOTTLE BY MOUTH TWICE DAILY AS NEEDED 64108 mL 4     omeprazole (PRILOSEC) 20 MG capsule Take 1 capsule (20 mg total) by  mouth daily. 90 capsule 1     psyllium (KONSYL) Powd Take by mouth as needed.        senna-docusate (SENOKOT-S) 8.6-50 mg tablet Take 1 tablet by mouth daily. 30 tablet 6     traMADol (ULTRAM) 50 mg tablet TAKE ONE TABLET BY MOUTH THREE TIMES DAILY AS NEEDED FOR PAIN 30 tablet 1     traZODone (DESYREL) 50 MG tablet Take 0.5 tablets (25 mg total) by mouth at bedtime. 15 tablet 5     meclizine (ANTIVERT) 25 mg tablet Take 1 tablet (25 mg total) by mouth 3 (three) times a day as needed for dizziness or nausea. 30 tablet 1     No current facility-administered medications for this visit.        Total data points: 1

## 2021-06-23 ENCOUNTER — COMMUNICATION - HEALTHEAST (OUTPATIENT)
Dept: INTERNAL MEDICINE | Facility: CLINIC | Age: 86
End: 2021-06-23

## 2021-06-23 DIAGNOSIS — M81.0 OSTEOPOROSIS: ICD-10-CM

## 2021-06-23 NOTE — TELEPHONE ENCOUNTER
Please let pt's son know results:    Urine test is normal, no UTI.  Sugars are normal.  Red cell count and platelet counts are normal.    Let me know if muscle relaxer is not helping with pain.    Dr GAMBOA

## 2021-06-23 NOTE — PATIENT INSTRUCTIONS - HE
1. O'K to take Tylenol 3 times a day    2. Continue PT for dizziness, if no improvement in next 3 sessions, then see ENT (I put orders in)    3. Try muscle relaxer at bedtime: Tizanidine. Do not take it with Meclizine (dizziness medication) at the same time. If it does not help, then stop it.    4. See podiatrist for the toe pain. Use topical Lidocaine numbing medication on your toe to help with pain.

## 2021-06-23 NOTE — TELEPHONE ENCOUNTER
Patient Returning Call  Reason for call:  Patient's son returning call to the clinic.  Information relayed to patient:  Yes aas instructed below and he agrees with plan.  Patient has additional questions:  No  If YES, what are your questions/concerns:  none  Okay to leave a detailed message?: Yes

## 2021-06-23 NOTE — PROGRESS NOTES
[facility] Follow Up Note    Assessment/Plan:    1. Benign paroxysmal positional vertigo, unspecified laterality  This is chronic but currently is worse.  She will continue meclizine and physical therapy.  Discussed if in the next 2-3 treatments with physical therapy symptoms do not improve she should see an ENT.  - Ambulatory referral to ENT  - Ambulatory referral to PT/OT    2. Chronic midline low back pain without sciatica  Discussed increasing Tylenol to 3 times a day.  Will add Zanaflex at bedtime.  Discussed not to mix it with meclizine.  Will have physical therapy work with her on the back pain as well  - tiZANidine (ZANAFLEX) 2 MG tablet; 1-2 tabs at bed time as needed for neck and back pain  Dispense: 60 tablet; Refill: 1  - Ambulatory referral to PT/OT    3. Abnormal glucose  Screen for diabetes  - Glycosylated Hemoglobin A1c    4. Dysuria  Screen for UTI, no CVA tenderness  - Urinalysis-UC if Indicated  - Culture, Urine    5. Leukopenia, unspecified type  We will check a follow-up blood work today  - HM1(CBC and Differential)  - HM1 (CBC with Diff)    6. Neck pain  Suspect muscle tension  - tiZANidine (ZANAFLEX) 2 MG tablet; 1-2 tabs at bed time as needed for neck and back pain  Dispense: 60 tablet; Refill: 1  - Ambulatory referral to PT/OT    7. Corn of toe  No sign of infection  - Ambulatory referral to Podiatry    Anjelica Kapadia MD    Chief Complaint:  Chief Complaint   Patient presents with     Headache     [id number]     Back Pain     x3-4days     Dizziness     3-4weeks       History of Present Illness:  Radha is a 88 y.o. female who is originally from [place] who is currently here with her son who is interpreting..  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   She is currently here for follow-up and complaint of worsening headache dizziness and not feeling well.    Patient has history of chronic vertigo but it has been getting worse.  It is when she lies down and moves around.  If she sits still her symptoms are better.  She was referred to physical therapy and had one session there which has not helped.  Due to vertigo patient does have some balance problems and almost fell but denies any recent full-blown falls.  She denies any nausea.  She continues to have pain on top of her head and cervical paraspinal muscle tension.    Her low back pain also has gotten worse and she states that it is 8-9 out of 10.  It has gotten worse 4 days ago.  She denies any falls.  She also have been getting some burning with urination and wants to have her urine.    She also developed a painful callus-wart on her left fourth toe.  It is painful.  There is no cellulitis or drainage from it.    For pain she has been taking Tylenol 650 mg at bedtime which does not help much.  Tramadol has not helped in the past.    Of note on her previous blood work she was also noted to have slightly low platelets and low white cell count.  We will repeat it again today.  If platelets continue to be low will avoid ibuprofen.    Review of Systems:  A comprehensive review of systems was performed and was otherwise negative with no fevers or chills    PFSH:  Social History: Reviewed  Social History     Tobacco Use   Smoking Status Never Smoker   Smokeless Tobacco Never Used     Social History     Social History Narrative    Patient lives by herself but does have PCA who helps with cleaning and cooking.  She does have 2 sons in the area.  She is originally from [place].       Past History: Viewed  Current Outpatient Medications   Medication Sig Dispense Refill     acetaminophen (TYLENOL) 500 MG tablet Take 500-1,000 mg by mouth.       alendronate (FOSAMAX) 70 MG tablet Take 1 tablet (70 mg total) by  mouth every 7 days. Take with large glass of water and do not lie down for 2 hours after taking it. 4 tablet 11     amLODIPine (NORVASC) 5 MG tablet Take 1 tablet (5 mg total) by mouth daily. 90 tablet 3     diclofenac sodium (VOLTAREN) 1 % Gel Apply 4 grams twice a day for shoulder pain 100 g 1     fluticasone (FLONASE) 50 mcg/actuation nasal spray 2 sprays into each nostril daily. 16 g 11     lactose-reduced food with fibr Liqd Take 1 Bottle by mouth 2 (two) times a day as needed. (Patient taking differently: Take 1 Bottle by mouth as needed. ) 60 Bottle 6     loratadine (CLARITIN) 10 mg tablet Take 1 tablet (10 mg total) by mouth daily. 30 tablet 11     meclizine (ANTIVERT) 25 mg tablet Take 1 tablet (25 mg total) by mouth 3 (three) times a day as needed for dizziness or nausea. 30 tablet 1     mometasone (ELOCON) 0.1 % cream Apply twice a day for 2 weeks, then take 1 week off. May repeat 50 g 3     nut.tx.gluc.intol,lac-free,soy (GLUCERNA SHAKE) Liqd DRINK ONE BOTTLE BY MOUTH TWICE DAILY AS NEEDED 51294 mL 4     omeprazole (PRILOSEC) 20 MG capsule Take 1 capsule (20 mg total) by mouth daily. 90 capsule 1     psyllium (KONSYL) Powd Take by mouth as needed.        senna-docusate (SENOKOT-S) 8.6-50 mg tablet Take 1 tablet by mouth daily. 30 tablet 6     traZODone (DESYREL) 50 MG tablet Take 0.5 tablets (25 mg total) by mouth at bedtime. 15 tablet 5     tiZANidine (ZANAFLEX) 2 MG tablet 1-2 tabs at bed time as needed for neck and back pain 60 tablet 1     No current facility-administered medications for this visit.        Family History: Viewed    Physical Exam:    Vitals:    01/23/19 1530   BP: 120/72   Patient Site: Left Arm   Patient Position: Sitting   Cuff Size: Adult Regular   Pulse: 77   SpO2: 98%   Weight: 142 lb 12.8 oz (64.8 kg)     Wt Readings from Last 3 Encounters:   01/23/19 142 lb 12.8 oz (64.8 kg)   12/28/18 142 lb 12.8 oz (64.8 kg)   10/23/18 142 lb 12.8 oz (64.8 kg)     Body mass index is 23.05  kg/m .    Constitutional:  Reveals a pleasant  female.  Vitals:  Per nursing notes.  HEENT:No cervical LAD, no thyromegaly,  conjunctiva is pink, no scleral icterus, TMs are visualized and normal bl, oropharynx is clear, no exudates, bilateral opacification of crystal noted.  Cardiac:  Regular rate and rhythm,no murmurs, rubs, or gallops.Legs without edema. Palpation of the radial pulse regular.  Lungs: Clear to auscultation bl.  Respiratory effort normal.  Abdomen:positive BS, soft, nontender, nondistended.  No hepato-splenomagaly  Skin:   Without rash, bruise, or palpable lesions.  Rheumatologic: Mild tenderness of palpation of her right shoulder.  Full range of motion in her neck.  Tenderness to palpation of her lumbar spine.  No CVA tenderness.  Neurologic:  Cranial nerves II-XII intact.     Psychiatric: affect is down, memory intact.     Data Review:    Analysis and Summary of Old Records (2): yes      Records Requested (1): no      Other History Summarized (from other people in the room) (2): no    Radiology Tests Summarized (XRAY/CT/MRI/DXA) (1): no    Labs Reviewed (1): yes    Medicine Tests Reviewed (EKG/ECHO/COLONOSCOPY/EGD) (1): no    Independent Review of EKG or X-RAY (2): no

## 2021-06-24 NOTE — TELEPHONE ENCOUNTER
Refill Approved    Rx renewed per Medication Renewal Policy. Medication was last renewed on 8/21/18.    Bina Kothari, [facility] Triage/Med Refill 2/18/2019     Requested Prescriptions   Pending Prescriptions Disp Refills     omeprazole (PRILOSEC) 20 MG capsule [Pharmacy Med Name: OMEPRAZOLE DR 20 MG CAPSULE] 90 capsule 0     Sig: TAKE 1 CAPSULE BY MOUTH EVERY DAY    GI Medications Refill Protocol Passed - 2/15/2019  1:10 AM       Passed - PCP or prescribing provider visit in last 12 or next 3 months.    Last office visit with prescriber/PCP: 1/23/2019 Anjelica Kapadia MD OR same dept: 1/23/2019 Anjelica Kapadia MD OR same specialty: 1/23/2019 Anjelica Kapadia MD  Last physical: Visit date not found Last MTM visit: Visit date not found   Next visit within 3 mo: Visit date not found  Next physical within 3 mo: Visit date not found  Prescriber OR PCP: Anjelica Kapadia MD  Last diagnosis associated with med order: 1. GERD (gastroesophageal reflux disease)  - omeprazole (PRILOSEC) 20 MG capsule [Pharmacy Med Name: OMEPRAZOLE DR 20 MG CAPSULE]; TAKE 1 CAPSULE BY MOUTH EVERY DAY  Dispense: 90 capsule; Refill: 0    If protocol passes may refill for 12 months if within 3 months of last provider visit (or a total of 15 months).

## 2021-06-24 NOTE — PATIENT INSTRUCTIONS - HE
Exercises:  Exercise #1: Shoulder flex with wand, seated or supine, 3x-H  Comment #1: Assisted knee to chest, seated or supine, 3x alternating-H  Exercise #2: Instructed in proper sitting posture with HO issued; HO issued for body mechanics, lifting for neck and back care

## 2021-06-24 NOTE — PROGRESS NOTES
Radha Mcmanus is a 88 y.o. female seen in consultation at the request of Dr. Santoyo for dizziness.  Onset: 1 month  Episodic vs Chronic: episodic  Length of episodes: seconds  Description of episodes: spnning, nausea  Last episode: today  Frequency of episodes: daily  Positional: yes, worse with head back or laying to the right  Change in hearing with episodes:  no  Otologic history of infections or surgery: no  History of headaches: no  Headaches with episodes: no  History of head trauma: no  Previous evaluations: PT - Yue Ny OT, she diagnosed right BPPV      ALLERGY:    Allergies   Allergen Reactions     Erythromycin Base      Ioxaglate Sodium Unknown     Metrizamide Rash       MEDICATIONS:     Current Outpatient Medications on File Prior to Visit   Medication Sig Dispense Refill     acetaminophen (TYLENOL) 500 MG tablet Take 500-1,000 mg by mouth.       alendronate (FOSAMAX) 70 MG tablet Take 1 tablet (70 mg total) by mouth every 7 days. Take with large glass of water and do not lie down for 2 hours after taking it. 4 tablet 11     amLODIPine (NORVASC) 5 MG tablet Take 1 tablet (5 mg total) by mouth daily. 90 tablet 3     diclofenac sodium (VOLTAREN) 1 % Gel Apply 4 grams twice a day for shoulder pain 100 g 1     fluticasone (FLONASE) 50 mcg/actuation nasal spray 2 sprays into each nostril daily. 16 g 11     lactose-reduced food with fibr Liqd Take 1 Bottle by mouth 2 (two) times a day as needed. (Patient taking differently: Take 1 Bottle by mouth as needed. ) 60 Bottle 6     loratadine (CLARITIN) 10 mg tablet Take 1 tablet (10 mg total) by mouth daily. 30 tablet 11     meclizine (ANTIVERT) 25 mg tablet Take 1 tablet (25 mg total) by mouth 3 (three) times a day as needed for dizziness or nausea. 30 tablet 1     mometasone (ELOCON) 0.1 % cream Apply twice a day for 2 weeks, then take 1 week off. May repeat 50 g 3     nut.tx.gluc.intol,lac-free,soy (GLUCERNA SHAKE) Liqd DRINK ONE BOTTLE BY MOUTH TWICE DAILY  AS NEEDED 67002 mL 4     omeprazole (PRILOSEC) 20 MG capsule Take 1 capsule (20 mg total) by mouth daily. 90 capsule 1     psyllium (KONSYL) Powd Take by mouth as needed.        senna-docusate (SENOKOT-S) 8.6-50 mg tablet Take 1 tablet by mouth daily. 30 tablet 6     tiZANidine (ZANAFLEX) 2 MG tablet 1-2 tabs at bed time as needed for neck and back pain 60 tablet 1     traZODone (DESYREL) 50 MG tablet Take 0.5 tablets (25 mg total) by mouth at bedtime. 15 tablet 5     No current facility-administered medications on file prior to visit.        Past Medical/Surgical History, Family History and Social History reviewed in detail and documented separately in the medical record.    Complete Review of Systems:  A 10-point review was performed.  Pertinent positives are noted in the HPI and on a separate scanned document in the chart.    EXAM:  There were no vitals filed for this visit.    Nurse documentation reviewed  and documented separately.    General Appearance: Pleasant, alert, appropriate appearance for age. No acute distress    Head Exam: Normal. Normocephalic, atraumatic.    Eye Exam: Normal external eye, conjunctiva, lids, cornea. Extra-ocular movements are intact.    Left external ear: normal  Left otoscopic exam: Normal EAC. Normal TM     Right external ear: normal  Right otoscopic exam: Normal EAC. Normal TM    Nose Exam: Normal external nose. Septum midline. Nasal mucosa normal.  Inferior turbinates normal.    OroPharynx Exam: Dental hygiene adequate. Normal tongue. Normal buccal mucosa. Normal palate.  Normal pharynx. Normal tonsils.    Neck Exam: Supple, no masses or nodes. Trachea and larynx midline.    Thyroid Exam: No tenderness, nodules or enlargement.    Salivary Glands: nontender without masses    Neuro: Alert and oriented times 3, CN 2-12 grossly intact, no nystagmus, PERRL, EOMI, normal speech and gait    Chest/Respiratory Exam: Normal chest wall motion and respiratory effort. No audible stridor or  wheezing.    Cardiovascular Exam: Regular rate and rhythm.  No cyanosis, clubbing or edema.    Pulses: carotid pulses normal    Vestibular:  Gait is normal, tandem gait is normal, Romberg is normal, Lavern-Hallpike is abnormal with rotary nystagmus to the right, Finger-nose-finger is normal, Fukuda is normal    ASSESSMENT:  1. BPPV (benign paroxysmal positional vertigo), right        PLAN: Findings, assessment, and management options were discussed. I would suggest heading back to [facility] for further canalith repositioning.  Agree with the diagnosis.

## 2021-06-24 NOTE — PROGRESS NOTES
[facility] Daily Progress     Patient Name: Radha Mcmanus  Date: 3/5/2019  Visit #: 2  Referral Diagnosis: BPPV  Referring provider: Service, Moses Smith,*  Visit Diagnosis:     ICD-10-CM    1. Benign paroxysmal positional vertigo of right ear H81.11    2. Vertigo R42    3. Unsteadiness on feet R26.81    4. Decreased activities of daily living (ADL) R68.89          Assessment:     Patient is appropriate to continue with skilled occupational therapy intervention, as indicated by initial plan of care.    Goal Status:  Patient will be able to walk: for community mobility;without loss of balance;in 8 weeks  Patient able to perform bed mobility: without dizziness;in 8 weeks  Patient will turn head: without dizziness;for conversation;in 8 weeks      Plan / Patient Education:     Continue with initial plan of care. Progress with home program as tolerated.    Subjective:     Pain Ratin    Objective:       Subjective progress relative to last visit:  Intensity of dizziness is:  less  Frequency of dizziness is: less  Duration of dizziness is: less  Balance is:  unchanged    Positional Tests:  Hallpike Right:  NT  Hallpike Left:  Abnormal - Nystagmus difficult to determine direction of nystagmus as patient could not keep eyes open  Head Roll Right: NT  Head Roll Left:  NT    Treatment Today: Treated with left Epley maneuver x2. Plan to treat right ear next visit.  TREATMENT MINUTES COMMENTS   Evaluation     Self-care/ Home management     Neuromuscular Re-education     Canalith repositioning procedure 20          Total 20    Blank areas are intentional and mean the treatment did not include these items.          Karen Ny  3/5/2019  9:02 AM

## 2021-06-24 NOTE — PROGRESS NOTES
[facility] Daily Progress     Patient Name: Radha Mcmanus  Date: 3/12/2019  Visit #: 3  Referral Diagnosis: BPPV  Referring provider: Anjelica Kapadia MD  Visit Diagnosis:     ICD-10-CM    1. Vertigo R42    2. Benign paroxysmal positional vertigo of left ear H81.12    3. Unsteadiness on feet R26.81    4. Decreased activities of daily living (ADL) R68.89          Assessment:     Patient is appropriate to continue with skilled occupational therapy intervention, as indicated by initial plan of care.    Goal Status:  Patient will be able to walk: for community mobility;without loss of balance;in 8 weeks  Patient able to perform bed mobility: without dizziness;in 8 weeks  Patient will turn head: without dizziness;for conversation;in 8 weeks      Plan / Patient Education:     Continue with initial plan of care. Progress with home program as tolerated.    Subjective:     Pain Ratin    Objective:       Subjective progress relative to last visit:  Intensity of dizziness is:  less  Frequency of dizziness is: less  Duration of dizziness is: less  Balance is:  unchanged    Positional Tests:  Hallpike Right:  Abnormal - Nystagmus right torsional, up beating, patient unable to keep eyes open to determine latency and fatigue  Hallpike Left:  Negative  Head Roll Right: NT  Head Roll Left:  NT    Treatment Today: Treated with right Epley maneuver x2. Left [place]-Hallpike is negative today after last visit.  TREATMENT MINUTES COMMENTS   Evaluation     Self-care/ Home management     Neuromuscular Re-education     Canalith repositioning procedure 20          Total 20    Blank areas are intentional and mean the treatment did not include these items.          Karen Ny  3/12/2019  11:02 AM

## 2021-06-24 NOTE — PROGRESS NOTES
I called [facility] OT and scheduled an appointment for patient March 5th 8:45am at the [place] [facility] requested by Dr. Gillette. Patient notified via  language line.     Sima Ching RN  [facility] ENT  [phone]

## 2021-06-25 NOTE — TELEPHONE ENCOUNTER
Refill Approved    Rx renewed per Medication Renewal Policy. Medication was last renewed on 9/17/2018 with 5 refills.   Last office visit: 1/23/2019 with PCP Dr DAPHNEY Muñiz, [facility] Triage/Med Refill 3/17/2019     Requested Prescriptions   Pending Prescriptions Disp Refills     traZODone (DESYREL) 50 MG tablet 45 tablet 1     Sig: Take 0.5 tablets (25 mg total) by mouth at bedtime.    Tricyclics/Misc Antidepressant/Antianxiety Meds Refill Protocol Passed - 3/15/2019 12:04 PM       Passed - PCP or prescribing provider visit in last year    Last office visit with prescriber/PCP: 1/23/2019 Anjelica Kapadia MD OR same dept: 1/23/2019 Anjelica Kapadia MD OR same specialty: 1/23/2019 Anjelica Kapadia MD  Last physical: Visit date not found Last MTM visit: Visit date not found   Next visit within 3 mo: Visit date not found  Next physical within 3 mo: Visit date not found  Prescriber OR PCP: Anjelica Kapadia MD  Last diagnosis associated with med order: There are no diagnoses linked to this encounter.  If protocol passes may refill for 12 months if within 3 months of last provider visit (or a total of 15 months).

## 2021-06-25 NOTE — PROGRESS NOTES
[facility] Daily Progress     Patient Name: Radha Mcmanus  Date: 3/19/2019  Visit #: 4  Referral Diagnosis: BPPV  Referring provider: Anjelica Kapadia MD  Visit Diagnosis:     ICD-10-CM    1. Unsteadiness on feet R26.81    2. Decreased activities of daily living (ADL) R68.89    3. Vertigo R42          Assessment:     Patient is appropriate to continue with skilled occupational therapy intervention, as indicated by initial plan of care.    Goal Status:  Patient will be able to walk: for community mobility;without loss of balance;in 8 weeks  Patient able to perform bed mobility: without dizziness;in 8 weeks  Patient will turn head: without dizziness;for conversation;in 8 weeks      Plan / Patient Education:     Continue with initial plan of care. Progress with home program as tolerated.    Subjective:     Pain Ratin    Objective:       Subjective progress relative to last visit:  Intensity of dizziness is:  less  Frequency of dizziness is: less  Duration of dizziness is: less  Balance is:  unchanged    Positional Tests:  Hallpike Right:  Abnormal - Nystagmus right torsional, up beating, patient unable to keep eyes open to determine latency and fatigue  Hallpike Left:  vertigo but unable to keep eyes open to determine nystagmus  Head Roll Right: NT  Head Roll Left:  NT    Treatment Today: Treated with right Epley maneuver x2 and left Epley maneuver x1. Patient and family instructed on home Epley maneuver as well.  TREATMENT MINUTES COMMENTS   Evaluation     Self-care/ Home management     Neuromuscular Re-education     Canalith repositioning procedure 25          Total 25    Blank areas are intentional and mean the treatment did not include these items.          Karen Ny  3/19/2019  11:02 AM

## 2021-06-25 NOTE — PROGRESS NOTES
[facility] Care Coordination    Internal CC change effective 6/1/21.  Mailed member CC Change letter.  Additional tasks to be completed by CMS include: update database & EPIC, enter CC Change in MMIS, and move member files on [address].  Charmaine Moncada  Case Management Specialist  [facility]  [phone]

## 2021-06-25 NOTE — PROGRESS NOTES
[facility] Daily Progress     Patient Name: Radha Mcmanus  PRECAUTIONS/RESTRICTIONS:  Latent TB, Osteoporosis, Dementia, Chronic Pain Syndrome, Vertigo  Date: 3/21/2019  Visit #: 2  PTA visit #:  0  Referral Diagnosis: Chronic LBP w/o Sciatica/Neck Pain  Referring provider: Anjelica Kapadia MD  Visit Diagnosis:     ICD-10-CM    1. Cervicalgia M54.2    2. Chronic midline low back pain without sciatica M54.5     G89.29    3. Decreased ROM of lumbar spine M53.86    4. Decreased ROM of neck R29.898    5. Poor posture R29.3    6. Muscle weakness (generalized) M62.81          Assessment:     HEP/POC compliance is  poor/non-compliant.  Response to Intervention limited as patient came for only initial visit only.  Patient is appropriate to continue with skilled physical therapy intervention, as indicated by initial plan of care.    Goal Status:  Pt. will demonstrate/verbalize independence in self-management of condition in : Comment-no met  Pt. will be independent with home exercise program in : 6 weeks-unmet  Pt. will report decreased intensity, frequency of : Headache;in 6 weeks;Comment-unmet daily HA worse at sleep and upon walking in the morning  Comment:: decrease frequency to 1-2/week  Pt. will have improved quality of sleep: with less pain;waking less times/night;in 6 weeks;Comment-sleep interruptions 2x/night and unable to lay on right side due to dizziness  Comment:: tolerate right SL for sleep    Patient will decrease : BJ score;NDI score;in 6 weeks;by _ points;Comment  by ___ points: 5  Comment: NDI Initial 42.225 Current 87.5%; BJ Initial 32% Current 38%  Pt will: be able to pray with forehead on floor with less neck and LBP in 6 weeks -unmet-prays in the chair      Plan / Patient Education:     Continue with initial plan of care.  Progress with home program as tolerated.   Plan on seeing 1x/week for 2 more visits then re assess needs.  Continue with manual therapy, MFR to L-S and UT, Add US to  neck, review previously instructed exercises, add neck rot/nodding, cerv/scap retraction, add hooklying trunk rotation,supine morning stretch, supine core bent knee lift, row/pull down, UBE,   .    Subjective:     Pain Rating: neck pain 5/10; LBP 9/10  Has HA today and is present on the Left side.  Receiving treatment for dizziness but treatment isn't helpful  Has not been compliant with initial exercises shown.  Indicating pain in left anterior thigh and leg pain, new since initial visit.  Response to PT/benefits:  None but had some benefit from initial visit.    Continued difficultisleep at right SL with 1 interruption, HA 3x/week, pray position with increased HA and LBP:      Outcome Measures:  NDI Initial 42.225 Current 87.5%     BJ Initial 32% Current 38%    Objective:     Neck pain is worse right>left.  Palpation:  Right>left neck/UT/lumbar paraspinals    Shoulder/Elbow ROM           Date: 2/12/2019    3/21/2019      Shoulder and Elbow ROM ( )    AROM in degrees AROM in degrees AROM in degrees    Right Left Right Left Right Left   Shoulder Flexion (0-180 ) 125-130 pain    125 shoulder pain 125 shoulder pain       Shoulder Abduction (0-180 )      90 shoulder pain  90 shoulder pain       Shoulder Extension (0-60 ) 35-40 pain    40 shoulder pain  40 shoulder pain       Shoulder ER (0-90 ) Unable to touch back of neck pain T1  70 right shoulder pain 70 less left shoulder pain        Shoulder IR (0-70 ) L3 pain L3 L2 shoulder pain  L2-3 shoulder pain       Shoulder IR/EXT               Elbow Flexion (150 )               Elbow Extension (0 )                 PROM in degrees PROM in degrees PROM in degrees     Right Left Right Left Right Left   Shoulder Flexion (0-180 )               Shoulder Abduction (0-180 ) 150 pain             Shoulder Extension (0-60 )               Shoulder ER (0-90 )               Shoulder IR (0-70 )               Elbow Flexion (150 )               Elbow Extension (0 )                         Cervical ROM           Date: 2/12/2019    3/21/2019      *Indicate scale AROM AROM AROM   Cervical Flexion Min loss, bilat neck pain Min loss, bilat neck pain      Cervical Extension NT NT        Right Left Right Left Right Left   Cervical Sidebending Nil-min loss, NE Min-nil loss, left neck pain  Mod loss, pain  Min loss, Pain       Cervical Rotation Mod loss, tight right neck Min loss, left neck pain MOd loss, pain  Min loss, pain        Cervical Protraction         Cervical Retraction         Thoracic Flexion         Thoracic Extension         Thoracic Sidebending               Thoracic Rotation                     Lumbar ROM:             Date: 2/12/2019    3/21/2019      *Indicate scale AROM AROM AROM   Lumbar Flexion Seated, to mid legs, pain pressure Seated to ankles with increased HA pain and LBP      Lumbar Extension Mod-severe loss, LBP         Right Left Right Left Right Left   Lumbar Sidebending Mod -severe loss, LBP Mod-severe loss, LBP           Lumbar Rotation Mod loss,neck pain Mod loss, neck pain Mod loss,  Upper back pain and LBP   Mod loss with greater upper back and LBP       Thoracic Flexion         Thoracic Extension         Thoracic Sidebending               Thoracic Rotation                     Today's Exercises:  Verbal review of previously instructed ex with patients son        Treatment Today    TREATMENT MINUTES COMMENTS   Evaluation     Self-care/ Home management     Manual therapy 15 Prone with soft blue mask:  STM neck to lumbar   Neuromuscular Re-education     Therapeutic Activity     Therapeutic Exercises 25 ROM neck/shoulder/lumbar and verbal review of previously instructed exercises.   Gait training     Modality__________________                Total 40    Blank areas are intentional and mean the treatment did not include these items.       Brianna Romero  3/21/2019

## 2021-06-27 NOTE — PROGRESS NOTES
Progress Notes by Brianna Romero PT at 2/12/2019  1:30 PM     Author: Brianna Romero PT Service: -- Author Type: Physical Therapist    Filed: 3/21/2019  1:33 PM Encounter Date: 2/12/2019 Status: Attested Addendum    : Brianna Romero PT (Physical Therapist)    Related Notes: Original Note by Brianna Romero PT (Physical Therapist) filed at 3/21/2019  1:30 PM    Cosigner: Anjelica Kapadia MD at 3/21/2019  1:54 PM    Attestation signed by Anjelica Kapadia MD at 3/21/2019  1:54 PM    Agree with plan of care                    [facility] Certification Request    March 21, 2019      Patient: Radha Mcmanus  MR Number: [id number]  YOB: 1931  Date of Visit: 2/12/2019      Dear Dr. Anjelica Kapadia:    Thank you for this referral.   We are seeing Radha Mcmanus for Physical Therapy of  Chronic LBP w/o Sciatica/Neck Pain.    Medicare and/or Medicaid requires physician review and approval of the treatment plan. Please review the plan of care and verify that you agree with the therapy plan of care by co-signing this note.      Plan of Care:   [facility] PLAN OF CARE 2/12/2019   Communication with Referral Source   Patient Related Instruction Nature of Condition;Treatment plan and rationale;Self Care instruction;Basis of treatment;Body mechanics;Posture;Precautions;Expected outcome;Next steps   Times per Week 2 x3 weeks then 1x/6 weeks   Number of Weeks 9   Number of Visits 12   Discharge Planning INdependent HEP and self-management of symptoms   Therapeutic Exercise ROM;Stretching;Strengthening   Neuromuscular Reeducation kinesio tape;posture;core   Manual Therapy soft tissue mobilization;myofascial release   Modalities ultrasound   Equipment theraband;other         Goals:  Pt. will demonstrate/verbalize independence in self-management of condition in : Comment  Pt. will be independent with home exercise program in : 6 weeks  Pt. will report decreased intensity, frequency of :  Headache;in 6 weeks;Comment  Comment:: decrease frequency to 1-2/week  Pt. will have improved quality of sleep: with less pain;waking less times/night;in 6 weeks;Comment  Comment:: tolerate right SL for sleep    Patient will decrease : BJ score;NDI score;in 6 weeks;by _ points;Comment  by ___ points: 5  Comment: NDI 42.225; BJ 32%  Pt will: be able to pray with forehead on floor with less neck and LBP in 6 weeks        If you have any questions or concerns, please don't hesitate to call.    Sincerely,      Brianna Romero, PT        Physician recommendation:     ___ Follow therapist's recommendation        ___ Modify therapy      *Physician co-signature indicates they certify the need for these services furnished within this plan and while under their care.        [facility]   [facility] Initial Evaluation    Patient Name: Radha Mcmanus  PRECAUTIONS/RESTRICTIONS: Latent TB, Osteoporosis, Dementia, Chronic Pain Syndrome, Vertigo  Date of evaluation: 2/12/2019  Referral Diagnosis: Chronic LBP w/o Sciatica/Neck Pain  Referring provider: Anjelica Kapadia MD  Visit Diagnosis:     ICD-10-CM    1. Cervicalgia M54.2    2. Chronic midline low back pain without sciatica M54.5     G89.29    3. Decreased ROM of lumbar spine M53.86    4. Decreased ROM of neck R29.898    5. Poor posture R29.3    6. Muscle weakness (generalized) M62.81        Assessment:      Skilled PT is required to modulate pain, increase ROM/strength, posture, and function through modalities, manual therapy, exercise and education  Pt. is appropriate for skilled PT intervention as outlined in the Plan of Care (POC).  Pt. is a good candidate for skilled PT services to improve pain levels and function.    Goals:  Pt. will demonstrate/verbalize independence in self-management of condition in : Comment  Pt. will be independent with home exercise program in : 6 weeks  Pt. will report decreased intensity, frequency of : Headache;in 6  weeks;Comment  Comment:: decrease frequency to 1-2/week  Pt. will have improved quality of sleep: with less pain;waking less times/night;in 6 weeks;Comment  Comment:: tolerate right SL for sleep    Patient will decrease : BJ score;NDI score;in 6 weeks;by _ points;Comment  by ___ points: 5  Comment: NDI 42.225; BJ 32%  Pt will: be able to pray with forehead on floor with less neck and LBP in 6 weeks      Patient's expectations/goals are realistic.    Barriers to Learning or Achieving Goals:  Co-morbidities or other medical factors.  osteoporosis, vertigo       Plan / Patient Instructions:        Plan of Care:   [facility] PLAN OF CARE 2/12/2019   Communication with Referral Source   Patient Related Instruction Nature of Condition;Treatment plan and rationale;Self Care instruction;Basis of treatment;Body mechanics;Posture;Precautions;Expected outcome;Next steps   Times per Week 2 x3 weeks then 1x/6 weeks   Number of Weeks 9   Number of Visits 12   Discharge Planning INdependent HEP and self-management of symptoms   Therapeutic Exercise ROM;Stretching;Strengthening   Neuromuscular Reeducation kinesio tape;posture;core   Manual Therapy soft tissue mobilization;myofascial release   Modalities ultrasound   Equipment theraband;other     POC and pathology of condition were reviewed with patient.  Pt. is in agreement with the Plan of Care  A Home Exercise Program (HEP) was initiated today.  Pt. was instructed in exercises by PT and patient was given a handout with detailed instructions.    The goals and plan of care were established in collaboration with the patient.    Plan for next visit: see 2x/week for 5 more visits then reassess progress/needs.  Next:  Assess response to manual therapy, try MFR to lumbosacral, consider US to neck at 1MHZ, 100%, 1.2-1.5 w/c2; gradually add gentle neck rotation and nodding, cerv/scap retraction add hooklying trunk rotation, supine morning stretch, supine core bent knee lift, row/pull  down, UBE,     Subjective:         Social information:      Occupation:   NA   Work Status:  NA   Equipment Available: None    History of Present Illness:    Radha Mcmanus is a 88 y.o. female who presents to therapy today with chief complaints of neck pain, HA, dizziness and LBP.  Onset gradual 2 weeks ago, approximately 2/1/2019 without injury or incident.  Patient states she has had similar pain in the past but it has been manageable.   She reports increased HA and dizziness when lying down and when transitioning from chair and bed.  She denies UE LE pain/numbness/tingle except for right shoulder pain.  Pt demo's signs and sx consistent with DDD/DJD/poor posture.     Pain Rating current:  7  Pain rating at best: 5  Pain rating at worst: 8  Pain description: ache/sharp    Functional limitations are described as occurring with:   sleep at right SL with 1 interruption, HA 3x/week, pray position with increased HA and LBP    Patient reports benefit from:  pain medication, heat         Objective:      Note: Items left blank indicates the item was not performed or not indicated at the time of the evaluation.    Patient Outcome Measures :    Modified Oswestry Low Back Pain Disablity Questionnaire  in %: 32     Scores range from 0-100%, where a score of 0% represents minimal pain and maximal function. The minimal clinically important difference is a score reduction of 12%.  Neck Disability Score in %: 42.22     Scores range from 0-100%, where a score of 0% represents minimal pain and maximal function. The minmal clinically important difference is a score reduction of 10%.    Examination  1. Cervicalgia     2. Chronic midline low back pain without sciatica     3. Decreased ROM of lumbar spine     4. Decreased ROM of neck     5. Poor posture     6. Muscle weakness (generalized)         Involved side: Bilateral    Posture Observation: Standing Cerv protraction/rounded shoulders, high cerv extension, left shoulder/scap/iliac  crest high, left knee bent    Palpation:  Moderated tenderness bilat neck/UT, right shoulder, bilat lumbar paraspinals and inferior iliac crest.    Repeated Motion Testing:  NA    Passive Mobility - Joint Integrity:  NA    LE Screen:  not tested/deferred   Shoulder/Elbow ROM  Date: 2/12/2019       Shoulder and Elbow ROM ( )   AROM in degrees AROM in degrees AROM in degrees    Right Left Right Left Right Left   Shoulder Flexion (0-180 ) 125-130 pain        Shoulder Abduction (0-180 )         Shoulder Extension (0-60 ) 35-40 pain        Shoulder ER (0-90 ) Unable to touch back of neck pain T1       Shoulder IR (0-70 ) L3 pain L3       Shoulder IR/EXT         Elbow Flexion (150 )         Elbow Extension (0 )          PROM in degrees PROM in degrees PROM in degrees    Right Left Right Left Right Left   Shoulder Flexion (0-180 )         Shoulder Abduction (0-180 ) 150 pain        Shoulder Extension (0-60 )         Shoulder ER (0-90 )         Shoulder IR (0-70 )         Elbow Flexion (150 )         Elbow Extension (0 )               Cervical ROM  Date: 2/12/2019       *Indicate scale AROM AROM AROM   Cervical Flexion Min loss, bilat neck pain     Cervical Extension NT      Right Left Right Left Right Left   Cervical Sidebending Nil-min loss, NE Min-nil loss, left neck pain       Cervical Rotation Mod loss, tight right neck Min loss, left neck pain       Cervical Protraction      Cervical Retraction      Thoracic Flexion      Thoracic Extension      Thoracic Sidebending         Thoracic Rotation             Lumbar ROM:    Date: 2/12/2019       *Indicate scale AROM AROM AROM   Lumbar Flexion Seated, to mid legs, pain pressure     Lumbar Extension Mod-severe loss, LBP      Right Left Right Left Right Left   Lumbar Sidebending Mod -severe loss, LBP Mod-severe loss, LBP       Lumbar Rotation Mod loss,neck pain Mod loss, neck pain       Thoracic Flexion      Thoracic Extension      Thoracic Sidebending         Thoracic Rotation            Lower Extremity Strength:     Date:      LE strength/5 Right Left Right Left Right Left   Hip Flexion (L1-3)         Hip Extension (L5-S1)         Hip Abduction (L4-5)         Hip Adduction (L2-3)         Hip External Rotation         Hip Internal Rotation         Knee Extension (L3-4)         Knee Flexion         Ankle Dorsiflexion (L4-5)         Great Toe Extension (L5)         Ankle Plantar flexion (S1)         Abdominals        Sensation           Reflex Testing  Lumbar Dermatomes Right Left UE Reflexes Right Left   Iliac Crest and Groin (L1)   Biceps (C5-6)     Anterior Medial Thigh (L2)   Brachioradialis (C5-6)     Anterior Thigh, Medial Epicondyle Femur (L3)   Triceps (C7-8)     Lateral Thigh, Anterior Knee, Medial Leg/Malleolus (L4)   Hoffmanns test     Lateral Leg, Dorsal Foot (L5)   LE Reflexes     Lateral Foot (S1)   Patellar (L3-4)     Posterior Leg (S2)   Achilles (S1-2)     Other:   Babinski Response           Lumbar Special Tests:    Lumbar Special Tests Right Left SI Tests Right  Left   Quadrant test   SI Compression     Straight leg raise   SI Distraction     Crossover response   POSH Test     Slump   Sacral Thrust     Sit-up test  FADIR     Trunk extensor endurance test  Resisted Abduction     Prone instability test  Other:     Pubic shotgun  Other:       Today's HEP:  Exercises:  Exercise #1: Shoulder flex with wand, seated or supine, 3x-H  Comment #1: Assisted knee to chest, seated or supine, 3x alternating-H  Exercise #2: Instructed in proper sitting posture with HO issued; HO issued for body mechanics, lifting for neck and back care    Patient reported decreased back pain and HA.  Some dizziness when lying supine with shoulder wand exercises so performed seated.    Treatment Today    TREATMENT MINUTES COMMENTS   Evaluation 30 Neck/back/shoulder   Self-care/ Home management     Manual therapy 12 STM to neck and low back prone with blue mask and pelvic pillow   Neuromuscular  Re-education     Therapeutic Activity     Therapeutic Exercises 12 See flow sheet   Gait training     Modality__________________                Total 54    Blank areas are intentional and mean the treatment did not include these items.     PT Evaluation Code: (Please list factors)  Patient History/Comorbidities: see above  Examination: see above  Clinical Presentation: uncomplicated  Clinical Decision Making: low    Patient History/  Comorbidities Examination  (body structures and functions, activity limitations, and/or participation restrictions) Clinical Presentation Clinical Decision Making (Complexity)   No documented Comorbidities or personal factors 1-2 Elements Stable and/or uncomplicated Low   1-2 documented comorbidities or personal factor 3 Elements Evolving clinical presentation with changing characteristics Moderate   3-4 documented comorbidities or personal factors 4 or more Unstable and unpredictable High              Brianna Romero  2/12/2019  1:52 PM

## 2021-06-27 NOTE — PROGRESS NOTES
"Progress Notes by Letty Pate AuD at 2/27/2019  9:30 AM     Author: Letty Pate AuD Service: -- Author Type: Audiologist    Filed: 2/27/2019  3:59 PM Encounter Date: 2/27/2019 Status: Signed    : Letty Pate AuD (Audiologist)       Audiology Report    Referring Provider:  Dr. Gillette    Summary: Audiology visit completed. Radha is accompanied by a family member and an  at the visit today. Please see audiogram below or in \"media\" tab for case history and results.      Transducer: Insert earphones and Circumaural headphones    Reliability was good  and there was good  SRT to PTA agreement.       Plan:  The patient is returned to ENT for follow up.  She should return for retesting with ENT recommendation.  The patient is a candidate for hearing aids, and should consider pending medical clearance.    Fatuma Bryson, [facility]  [place] Licensed Audiologist #[id number]                    "

## 2021-07-03 NOTE — ADDENDUM NOTE
Addendum Note by Eda Kapadia MD at 10/3/2017  6:04 PM     Author: Eda Kapadia MD Service: -- Author Type: Physician    Filed: 10/3/2017  6:04 PM Encounter Date: 10/3/2017 Status: Signed    : Eda Kapadia MD (Physician)    Addended by: EDA KAPADIA on: 10/3/2017 06:04 PM        Modules accepted: Orders

## 2021-07-03 NOTE — ADDENDUM NOTE
Addendum Note by Sima Shea RN at 2/27/2019 10:00 AM     Author: Sima Shea RN Service: -- Author Type: Registered Nurse    Filed: 2/27/2019 12:25 PM Encounter Date: 2/27/2019 Status: Signed    : Sima Shea RN (Registered Nurse)    Addended by: SIMA SHEA on: 2/27/2019 12:25 PM        Modules accepted: Orders

## 2021-07-03 NOTE — ADDENDUM NOTE
Addendum Note by Peter Jessica LPN at 6/6/2018  4:34 PM     Author: Peter Jessica LPN Service: -- Author Type: Licensed Nurse    Filed: 6/6/2018  4:34 PM Encounter Date: 6/5/2018 Status: Signed    : Peter Jessica LPN (Licensed Nurse)    Addended by: PETER JESSICA on: 6/6/2018 04:34 PM        Modules accepted: Orders

## 2021-07-03 NOTE — ADDENDUM NOTE
Addendum Note by Laurie Espino LPN at 9/10/2020  4:06 PM     Author: Laurie Espino LPN Service: -- Author Type: Licensed Nurse    Filed: 9/10/2020  4:06 PM Encounter Date: 9/7/2020 Status: Signed    : Laurie Espino LPN (Licensed Nurse)    Addended by: LAURIE ESPINO on: 9/10/2020 04:06 PM        Modules accepted: Orders

## 2021-07-04 NOTE — LETTER
Letter by Tello Ferris RN at      Author: Tello Ferris RN Service: -- Author Type: --    Filed:  Encounter Date: 5/26/2021 Status: (Other)           May 26, 2021    LASHAY FABIAN  [address]  [place] [address]      Dear Lashay:    As a member of [facility] ([facility]), you are provided a care coordinator. I will be your new care coordinator as of 6/1/21. I will be calling you soon to see how you are doing and determine your needs.    If you have any questions, please feel free to call me at  . If you reach my voice mail, please leave a message and your phone number. If you are hearing impaired, please call the [place] Relay at 114 or [phone] ([id number] relay service).    I look forward to speaking with you soon.    Sincerely,      Tello Ferris RN, PHN    E-mail: [email or web address]  Phone: [phone]      [facility] [place] is a health plan that contracts with both Medicare and the [place] Medical Assistance (Medicaid) program to provide benefits of both programs to enrollees. Enrollment in [facility] depends on contract renewal.      [facility]  [id number] DHS Approved ([id number])  [id number] (11/18)

## 2021-08-09 ENCOUNTER — PATIENT OUTREACH (OUTPATIENT)
Dept: GERIATRIC MEDICINE | Facility: CLINIC | Age: 86
End: 2021-08-09

## 2021-08-09 NOTE — PROGRESS NOTES
[facility] Care Coordination Contact    Member called to report new adult day care-[facility] effective 8/1/21. Updated POC and notified CMS.    Tello Ferris RN BSN PHN  [facility] Care Coordinator  [phone]  Fax:[phone]

## 2021-08-10 NOTE — PROGRESS NOTES
[facility] Care Coordination Contact    Member Signature - POC Change:  Per CC, member has made a change to their POC.  Care Plan Change Letter mailed to member for signature with a self-addressed return envelope.   Charmaine Moncada  Case Management Specialist  [facility]  [phone]

## 2021-08-16 ENCOUNTER — OFFICE VISIT (OUTPATIENT)
Dept: INTERNAL MEDICINE | Facility: CLINIC | Age: 86
End: 2021-08-16
Payer: COMMERCIAL

## 2021-08-16 VITALS
OXYGEN SATURATION: 95 % | DIASTOLIC BLOOD PRESSURE: 74 MMHG | TEMPERATURE: 99 F | BODY MASS INDEX: 22.91 KG/M2 | HEART RATE: 96 BPM | SYSTOLIC BLOOD PRESSURE: 118 MMHG | WEIGHT: 137.7 LBS

## 2021-08-16 DIAGNOSIS — G44.209 TENSION HEADACHE: ICD-10-CM

## 2021-08-16 DIAGNOSIS — J04.0 LARYNGITIS: ICD-10-CM

## 2021-08-16 DIAGNOSIS — I10 ESSENTIAL HYPERTENSION: ICD-10-CM

## 2021-08-16 DIAGNOSIS — R42 VERTIGO: Primary | ICD-10-CM

## 2021-08-16 DIAGNOSIS — R51.9 TEMPORAL PAIN: ICD-10-CM

## 2021-08-16 LAB
ANION GAP SERPL CALCULATED.3IONS-SCNC: 11 MMOL/L (ref 5–18)
BUN SERPL-MCNC: 9 MG/DL (ref 8–28)
CALCIUM SERPL-MCNC: 10.2 MG/DL (ref 8.5–10.5)
CHLORIDE BLD-SCNC: 99 MMOL/L (ref 98–107)
CO2 SERPL-SCNC: 28 MMOL/L (ref 22–31)
CREAT SERPL-MCNC: 0.76 MG/DL (ref 0.6–1.1)
ERYTHROCYTE [DISTWIDTH] IN BLOOD BY AUTOMATED COUNT: 12.7 % (ref 10–15)
ERYTHROCYTE [SEDIMENTATION RATE] IN BLOOD BY WESTERGREN METHOD: 42 MM/HR (ref 0–20)
GFR SERPL CREATININE-BSD FRML MDRD: 69 ML/MIN/1.73M2
GLUCOSE BLD-MCNC: 107 MG/DL (ref 70–125)
HCT VFR BLD AUTO: 41.3 % (ref 35–47)
HGB BLD-MCNC: 13.6 G/DL (ref 11.7–15.7)
MCH RBC QN AUTO: 29.3 PG (ref 26.5–33)
MCHC RBC AUTO-ENTMCNC: 32.9 G/DL (ref 31.5–36.5)
MCV RBC AUTO: 89 FL (ref 78–100)
PLATELET # BLD AUTO: 215 10E3/UL (ref 150–450)
POTASSIUM BLD-SCNC: 4 MMOL/L (ref 3.5–5)
RBC # BLD AUTO: 4.64 10E6/UL (ref 3.8–5.2)
SODIUM SERPL-SCNC: 138 MMOL/L (ref 136–145)
WBC # BLD AUTO: 6.6 10E3/UL (ref 4–11)

## 2021-08-16 PROCEDURE — 85652 RBC SED RATE AUTOMATED: CPT | Performed by: INTERNAL MEDICINE

## 2021-08-16 PROCEDURE — 80048 BASIC METABOLIC PNL TOTAL CA: CPT | Performed by: INTERNAL MEDICINE

## 2021-08-16 PROCEDURE — 36415 COLL VENOUS BLD VENIPUNCTURE: CPT | Performed by: INTERNAL MEDICINE

## 2021-08-16 PROCEDURE — 85027 COMPLETE CBC AUTOMATED: CPT | Performed by: INTERNAL MEDICINE

## 2021-08-16 PROCEDURE — U0005 INFEC AGEN DETEC AMPLI PROBE: HCPCS | Performed by: INTERNAL MEDICINE

## 2021-08-16 PROCEDURE — U0003 INFECTIOUS AGENT DETECTION BY NUCLEIC ACID (DNA OR RNA); SEVERE ACUTE RESPIRATORY SYNDROME CORONAVIRUS 2 (SARS-COV-2) (CORONAVIRUS DISEASE [COVID-19]), AMPLIFIED PROBE TECHNIQUE, MAKING USE OF HIGH THROUGHPUT TECHNOLOGIES AS DESCRIBED BY CMS-2020-01-R: HCPCS | Performed by: INTERNAL MEDICINE

## 2021-08-16 PROCEDURE — 99214 OFFICE O/P EST MOD 30 MIN: CPT | Performed by: INTERNAL MEDICINE

## 2021-08-16 RX ORDER — LOSARTAN POTASSIUM 25 MG/1
25 TABLET ORAL
COMMUNITY
Start: 2020-01-06 | End: 2021-11-26

## 2021-08-16 RX ORDER — CETIRIZINE HYDROCHLORIDE 10 MG/1
TABLET ORAL
COMMUNITY
Start: 2020-09-14 | End: 2021-11-26

## 2021-08-16 RX ORDER — MECLIZINE HYDROCHLORIDE 25 MG/1
25 TABLET ORAL 3 TIMES DAILY PRN
Qty: 20 TABLET | Refills: 0 | Status: SHIPPED | OUTPATIENT
Start: 2021-08-16 | End: 2021-11-26

## 2021-08-16 RX ORDER — FLUTICASONE PROPIONATE 50 MCG
SPRAY, SUSPENSION (ML) NASAL
COMMUNITY
Start: 2020-09-14

## 2021-08-16 NOTE — PROGRESS NOTES
ASSESSMENT AND PLAN:    1. Vertigo and right temporal headache  It appears that symptoms started at the same time.  Is also started having some sore throat will check for Covid.    Does have history of vertigo.  Symptomatically will give her meclizine and discussed if she develops speech difficulty, mental status changes, numbness or weakness anywhere to come to the emergency room.    I recommended that she uses walker to prevent falls.    - ESR: Erythrocyte sedimentation rate; Future  - CBC with platelets; Future  - Basic metabolic panel  (Ca, Cl, CO2, Creat, Gluc, K, Na, BUN); Future  - meclizine (ANTIVERT) 25 MG tablet; Take 1 tablet (25 mg) by mouth 3 times daily as needed for dizziness  Dispense: 20 tablet; Refill: 0  - Walker Order for DME - ONLY FOR DME  - ESR: Erythrocyte sedimentation rate  - CBC with platelets  - Basic metabolic panel  (Ca, Cl, CO2, Creat, Gluc, K, Na, BUN)    2.  Right temporal headache  We will check ESR to make sure it is not temporal arteritis.  She denies any change in her vision.  If labs are all negative and Covid screen is negative we can continue doing brain imaging if symptoms persist.  - ESR: Erythrocyte sedimentation rate; Future  - CBC with platelets; Future  - Symptomatic COVID-19 Virus (Coronavirus) by PCR; Future  - Symptomatic COVID-19 Virus (Coronavirus) by PCR Nasopharyngeal  - ESR: Erythrocyte sedimentation rate  - CBC with platelets  - Basic metabolic panel  (Ca, Cl, CO2, Creat, Gluc, K, Na, BUN)    3. Essential hypertension  Blood pressure well controlled, not too high or too low  - Basic metabolic panel  (Ca, Cl, CO2, Creat, Gluc, K, Na, BUN)    4. Laryngitis  But sample was collected.  - Symptomatic COVID-19 Virus (Coronavirus) by PCR Nasopharyngeal      Patient Instructions   1. Spinning and headache:  -can be from virus, will check Covid test  -can be due to inner ear crystal/sand    2. If develop speech problem, weakness, numbness anywhere in addition to  vertigo, come to ER.    3. Try meclizine for spinning, it can make you tired. Do not make sudden head movements or bend down, sleep with head elevated. Use walker to prevent falls.     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  She is currently here accompanied by her son who is translating.  She has not been feeling well.    3 days ago she started experiencing vertigo which is moderate in severity and a headache.  Symptoms are worse when she is lying down.  She denies any hearing deficit or vision changes.  Headache is there all the time.  She points to her right temple.  Occasionally she has some jaw pain but on exam today there is no TMJ tenderness.  She denies any ear pain.  When pain is severe sometimes she feels that she has blurry vision but no loss of vision.  She has had intermittent nausea but no vomiting.  She grades her headache is 9 out of 10.  She denies any numbness weakness or speech abnormalities.    She also has had sore throat for the last 3 days and mild cough, no body aches.  She has had Covid vaccine.    Additionally she does have mild right-sided neck pain in the paraspinal area.    REVIEW OF SYSTEMS:   See HPI, all other systems on review are negative.    Past Medical History:   Diagnosis Date     Hypertension      Ulcer      History   Smoking Status     Never Smoker   Smokeless Tobacco     Never Used     Family History   Problem Relation Age of Onset     No Known Problems Mother      No Known Problems Father      Past Surgical History:   Procedure Laterality Date     ORTHOPEDIC SURGERY       VITALS:  Vitals:    08/16/21 1041   BP: 118/74   BP Location: Left arm   Patient  Position: Sitting   Cuff Size: Adult Regular   Pulse: 96   Temp: 99  F (37.2  C)   TempSrc: Tympanic   SpO2: 95%   Weight: 62.5 kg (137 lb 11.2 oz)     Wt Readings from Last 3 Encounters:   08/16/21 62.5 kg (137 lb 11.2 oz)   12/23/19 61.5 kg (135 lb 8 oz)   11/26/19 62.7 kg (138 lb 4 oz)     PHYSICAL EXAM:  Constitutional: Pleasant elderly female, awake alert and oriented, concentration is good, she is in mild to moderate distress when I tried to lie her down due to dizziness and headache.  She points to her right temple where the headache is.  HEENT: nose and throat clear, ears normal, no tenderness to palpation of her frontal and maxillary sinus, no TMJ tenderness, mild tenderness to palpation of her right temporal area.  EYE: No light sensitivity, pupils are reactive to light.  Neck: no cervical or axillary adenopathy, oropharynx is clear  Cardiac:  S1 S2 , no murmurs rubs or gallops.  Lungs: Clear to auscultation, no wheezes or rales.  Abdomen:   Soft, flat and non-tender, without guarding, rebound, or mass.    Extremities: no joint effusion, no significant edema  Neurologic:  Speech clear, no arm or leg weakness, gait normal, cranial nerves are intact.  Psychiatric:  Mood and behavior appropriate, thinking is clear.     DECISION TO OBTAIN OLD RECORDS AND/OR OBTAIN HISTORY FROM SOMEONE OTHER THAN PATIENT, AND/OR ACCESSING CARE EVERYWHERE): No    REVIEW AND SUMMARIZATION OF OLD RECORDS, AND/OR OBTAINING HISTORY FROM SOMEONE OTHER THAN PATIENT, AND/OR DISCUSSION OF CASE WITH ANOTHER HEALTH CARE PROVIDER:  2, chart reviewed    REVIEW AND/OR ORDER OF OF CLINICAL LAB TESTS: 1 labs reviewed.    REVIEW AND/OR ORDER OF RADIOLOGY TESTS: No    REVIEW AND/OR ORDER OF MEDICAL TESTS (EKG/ECHO/COLONOSCOPY/EGD): No    INDEPENDENT  VISUALIZATION OF IMAGE, TRACING, OR SPECIMEN ITSELF (2 EACH): No    TOTAL: 3    Current Outpatient Medications   Medication Sig Dispense Refill     acetaminophen (TYLENOL) 500 MG tablet Take 1-2  tablets by mouth every 6 hours as needed for pain and fever. 30 tablet 0     amLODIPine (NORVASC) 5 MG tablet Take 5 mg by mouth daily.       aspirin (ASA) 81 MG EC tablet Take 81 mg by mouth       Calcium Citrate-Vitamin D 250-200 MG-UNIT TABS Take 1 tablet by mouth       cetirizine (ZYRTEC) 10 MG tablet        fluticasone (FLONASE) 50 MCG/ACT nasal spray        losartan (COZAAR) 25 MG tablet Take 25 mg by mouth       meclizine (ANTIVERT) 25 MG tablet Take 1 tablet (25 mg) by mouth 3 times daily as needed for dizziness 20 tablet 0     OMEPRAZOLE PO Take 40 mg by mouth every morning       ORDER FOR DME Equipment being ordered: Long handled shoe horn and reacher grabber 1 each 0     polyethylene glycol (MIRALAX/GLYCOLAX) Packet Take 17 g by mouth daily 30 packet 3     psyllium 0.52 G capsule Take 1 capsule (0.52 g) by mouth daily 540 capsule 3   Anjelica Kapadia MD  Internal Medicine  [facility]

## 2021-08-16 NOTE — PATIENT INSTRUCTIONS
1. Spinning and headache:  -can be from virus, will check Covid test  -can be due to inner ear crystal/sand    2. If develop speech problem, weakness, numbness anywhere in addition to vertigo, come to ER.    3. Try meclizine for spinning, it can make you tired. Do not make sudden head movements or bend down, sleep with head elevated. Use walker to prevent falls.

## 2021-08-17 LAB — SARS-COV-2 RNA RESP QL NAA+PROBE: NEGATIVE

## 2021-08-18 ENCOUNTER — TELEPHONE (OUTPATIENT)
Dept: INTERNAL MEDICINE | Facility: CLINIC | Age: 86
End: 2021-08-18

## 2021-08-18 NOTE — TELEPHONE ENCOUNTER
Spoke with son, she is feeling better, dizziness has improved with meclizine and she is able to sleep now.  Headache is still there but not persistent, on and off.  Discussed that labs all normal.  Slight ESR elevation is not impressive, temporal arteritis is unlikely.  He has follow-up with me again in a week.  Since her condition is improving we will monitor for now.  Discussed if she develop any neurological symptoms to come to ER.  If symptoms are persistent or worsening we can order brain imaging in the future.

## 2021-09-03 ENCOUNTER — TELEPHONE (OUTPATIENT)
Dept: INTERNAL MEDICINE | Facility: CLINIC | Age: 86
End: 2021-09-03

## 2021-09-14 ENCOUNTER — TELEPHONE (OUTPATIENT)
Dept: INTERNAL MEDICINE | Facility: CLINIC | Age: 86
End: 2021-09-14

## 2021-09-14 NOTE — TELEPHONE ENCOUNTER
1. What is the nature of the form? Adult Day Care    2. Has patient signed form if applicable? n/a    3. Where should completed form go? Fax [phone]    4. When was patient's last appointment with [facility]? Within one year    5. If further questions or when form is completed, is it okay to leave detailed message on patient's phone? YES

## 2021-09-14 NOTE — PROGRESS NOTES
[facility] Care Coordination Contact    2nd Attempt: Signed Letter not received from member, resent per process.   Charmaine Moncada  Case Management Specialist  [facility]  [phone]

## 2021-09-21 NOTE — TELEPHONE ENCOUNTER
I think I saw the form recently.  I saw her on 08.16.21 and after Covid test came back negative I signed her adult day care paperwork, I think.  Please check.  Dr GAMBOA

## 2021-09-22 NOTE — TELEPHONE ENCOUNTER
Talked with Said and informed him that the form possibly got mailed about a week ago however we cannot locate the form    He will bring in another copy for the form

## 2021-09-23 ENCOUNTER — TELEPHONE (OUTPATIENT)
Dept: INTERNAL MEDICINE | Facility: CLINIC | Age: 86
End: 2021-09-23

## 2021-09-23 NOTE — TELEPHONE ENCOUNTER
1. What is the nature of the form? Medical Report    2. Has patient signed form if applicable? NA    3. Where should completed form go? Patient will     4. When was patient's last appointment with [facility]? Within one year    5. If further questions or when form is completed, is it okay to leave detailed message on patient's phone? YES       Please call patient once form is complete.

## 2021-09-24 NOTE — TELEPHONE ENCOUNTER
Paper signed.  Please attach PE and medication list notes to it and provide to family as requested.  Scan to [employer].

## 2021-09-30 NOTE — TELEPHONE ENCOUNTER
Pt informed through VM that form has been placed down stairs for      Copy is in file for 2 weeks

## 2021-10-23 DIAGNOSIS — I10 ESSENTIAL (PRIMARY) HYPERTENSION: ICD-10-CM

## 2021-10-24 RX ORDER — AMLODIPINE BESYLATE 5 MG/1
5 TABLET ORAL DAILY
Qty: 90 TABLET | Refills: 3 | Status: SHIPPED | OUTPATIENT
Start: 2021-10-24 | End: 2022-07-16

## 2021-10-24 NOTE — TELEPHONE ENCOUNTER
"  Disp Refills Start End MARIA T    amLODIPine (NORVASC) 5 MG tablet 90 tablet 3 11/3/2020  No   Sig: TAKE 1 TABLET BY MOUTH EVERY DAY   Sent to pharmacy as: amLODIPine 5 mg tablet (NORVASC)   E-Prescribing Status: Receipt confirmed by pharmacy (11/3/2020 11:05 AM CST)       Last office visit provider:  8/16/21     Requested Prescriptions   Pending Prescriptions Disp Refills     amLODIPine (NORVASC) 5 MG tablet [Pharmacy Med Name: AMLODIPINE BESYLATE 5 MG TAB] 90 tablet 3     Sig: TAKE 1 TABLET BY MOUTH EVERY DAY       Calcium Channel Blockers Protocol  Passed - 10/23/2021 12:18 AM        Passed - Blood pressure under 140/90 in past 12 months     BP Readings from Last 3 Encounters:   08/16/21 118/74   09/29/20 112/64   12/23/19 118/66                 Passed - Recent (12 mo) or future (30 days) visit within the authorizing provider's specialty     Patient has had an office visit with the authorizing provider or a provider within the authorizing providers department within the previous 12 mos or has a future within next 30 days. See \"Patient Info\" tab in inbasket, or \"Choose Columns\" in Meds & Orders section of the refill encounter.              Passed - Medication is active on med list        Passed - Patient is age 18 or older        Passed - No active pregnancy on record        Passed - Normal serum creatinine on file in past 12 months     Recent Labs   Lab Test 08/16/21  1118   CR 0.76       Ok to refill medication if creatinine is low          Passed - No positive pregnancy test in past 12 months             Zhang Daily RN 10/24/21 3:11 PM  "

## 2021-11-14 DIAGNOSIS — K21.9 GASTRO-ESOPHAGEAL REFLUX DISEASE WITHOUT ESOPHAGITIS: ICD-10-CM

## 2021-11-15 ENCOUNTER — APPOINTMENT (OUTPATIENT)
Dept: GENERAL RADIOLOGY | Facility: CLINIC | Age: 86
End: 2021-11-15
Attending: EMERGENCY MEDICINE
Payer: COMMERCIAL

## 2021-11-15 ENCOUNTER — HOSPITAL ENCOUNTER (EMERGENCY)
Facility: CLINIC | Age: 86
Discharge: HOME | End: 2021-11-15
Attending: EMERGENCY MEDICINE | Admitting: EMERGENCY MEDICINE
Payer: COMMERCIAL

## 2021-11-15 VITALS
HEART RATE: 77 BPM | OXYGEN SATURATION: 98 % | BODY MASS INDEX: 22.8 KG/M2 | TEMPERATURE: 98 F | SYSTOLIC BLOOD PRESSURE: 156 MMHG | DIASTOLIC BLOOD PRESSURE: 86 MMHG | WEIGHT: 137 LBS | RESPIRATION RATE: 18 BRPM

## 2021-11-15 DIAGNOSIS — N30.00 ACUTE CYSTITIS WITHOUT HEMATURIA: ICD-10-CM

## 2021-11-15 DIAGNOSIS — J18.9 PNEUMONIA DUE TO INFECTIOUS ORGANISM, UNSPECIFIED LATERALITY, UNSPECIFIED PART OF LUNG: ICD-10-CM

## 2021-11-15 LAB
ALBUMIN SERPL-MCNC: 3.7 G/DL (ref 3.4–5)
ALBUMIN UR-MCNC: 30 MG/DL
ALP SERPL-CCNC: 78 U/L (ref 40–150)
ALT SERPL W P-5'-P-CCNC: 24 U/L (ref 0–50)
ANION GAP SERPL CALCULATED.3IONS-SCNC: 6 MMOL/L (ref 3–14)
APPEARANCE UR: ABNORMAL
AST SERPL W P-5'-P-CCNC: 22 U/L (ref 0–45)
BACTERIA #/AREA URNS HPF: ABNORMAL /HPF
BASOPHILS # BLD AUTO: 0.1 10E3/UL (ref 0–0.2)
BASOPHILS NFR BLD AUTO: 1 %
BILIRUB SERPL-MCNC: 0.5 MG/DL (ref 0.2–1.3)
BILIRUB UR QL STRIP: NEGATIVE
BUN SERPL-MCNC: 10 MG/DL (ref 7–30)
CALCIUM SERPL-MCNC: 10 MG/DL (ref 8.5–10.1)
CHLORIDE BLD-SCNC: 102 MMOL/L (ref 94–109)
CO2 SERPL-SCNC: 28 MMOL/L (ref 20–32)
COLOR UR AUTO: YELLOW
CREAT SERPL-MCNC: 0.67 MG/DL (ref 0.52–1.04)
EOSINOPHIL # BLD AUTO: 0.3 10E3/UL (ref 0–0.7)
EOSINOPHIL NFR BLD AUTO: 5 %
ERYTHROCYTE [DISTWIDTH] IN BLOOD BY AUTOMATED COUNT: 13.2 % (ref 10–15)
FLUAV RNA SPEC QL NAA+PROBE: NEGATIVE
FLUBV RNA RESP QL NAA+PROBE: NEGATIVE
GFR SERPL CREATININE-BSD FRML MDRD: 78 ML/MIN/1.73M2
GLUCOSE BLD-MCNC: 86 MG/DL (ref 70–99)
GLUCOSE UR STRIP-MCNC: NEGATIVE MG/DL
HCT VFR BLD AUTO: 41.8 % (ref 35–47)
HGB BLD-MCNC: 13.8 G/DL (ref 11.7–15.7)
HGB UR QL STRIP: ABNORMAL
HYALINE CASTS: 3 /LPF
IMM GRANULOCYTES # BLD: 0 10E3/UL
IMM GRANULOCYTES NFR BLD: 0 %
KETONES UR STRIP-MCNC: ABNORMAL MG/DL
LEUKOCYTE ESTERASE UR QL STRIP: ABNORMAL
LIPASE SERPL-CCNC: 157 U/L (ref 73–393)
LYMPHOCYTES # BLD AUTO: 2 10E3/UL (ref 0.8–5.3)
LYMPHOCYTES NFR BLD AUTO: 37 %
MCH RBC QN AUTO: 29.1 PG (ref 26.5–33)
MCHC RBC AUTO-ENTMCNC: 33 G/DL (ref 31.5–36.5)
MCV RBC AUTO: 88 FL (ref 78–100)
MONOCYTES # BLD AUTO: 0.5 10E3/UL (ref 0–1.3)
MONOCYTES NFR BLD AUTO: 9 %
MUCOUS THREADS #/AREA URNS LPF: PRESENT /LPF
NEUTROPHILS # BLD AUTO: 2.6 10E3/UL (ref 1.6–8.3)
NEUTROPHILS NFR BLD AUTO: 48 %
NITRATE UR QL: NEGATIVE
NRBC # BLD AUTO: 0 10E3/UL
NRBC BLD AUTO-RTO: 0 /100
PH UR STRIP: 5.5 [PH] (ref 5–7)
PLATELET # BLD AUTO: 175 10E3/UL (ref 150–450)
POTASSIUM BLD-SCNC: 3.5 MMOL/L (ref 3.4–5.3)
PROT SERPL-MCNC: 9 G/DL (ref 6.8–8.8)
RBC # BLD AUTO: 4.74 10E6/UL (ref 3.8–5.2)
RBC URINE: 6 /HPF
SARS-COV-2 RNA RESP QL NAA+PROBE: NEGATIVE
SODIUM SERPL-SCNC: 136 MMOL/L (ref 133–144)
SP GR UR STRIP: 1.01 (ref 1–1.03)
SQUAMOUS EPITHELIAL: 23 /HPF
TRANSITIONAL EPI: 4 /HPF
TROPONIN I SERPL-MCNC: <0.015 UG/L (ref 0–0.04)
UROBILINOGEN UR STRIP-MCNC: NORMAL MG/DL
WBC # BLD AUTO: 5.5 10E3/UL (ref 4–11)
WBC URINE: 91 /HPF

## 2021-11-15 PROCEDURE — 87086 URINE CULTURE/COLONY COUNT: CPT | Performed by: EMERGENCY MEDICINE

## 2021-11-15 PROCEDURE — 93005 ELECTROCARDIOGRAM TRACING: CPT | Performed by: EMERGENCY MEDICINE

## 2021-11-15 PROCEDURE — 36415 COLL VENOUS BLD VENIPUNCTURE: CPT | Performed by: EMERGENCY MEDICINE

## 2021-11-15 PROCEDURE — 250N000013 HC RX MED GY IP 250 OP 250 PS 637: Performed by: EMERGENCY MEDICINE

## 2021-11-15 PROCEDURE — 99285 EMERGENCY DEPT VISIT HI MDM: CPT | Mod: 25 | Performed by: EMERGENCY MEDICINE

## 2021-11-15 PROCEDURE — 85025 COMPLETE CBC W/AUTO DIFF WBC: CPT | Performed by: EMERGENCY MEDICINE

## 2021-11-15 PROCEDURE — 81001 URINALYSIS AUTO W/SCOPE: CPT | Performed by: EMERGENCY MEDICINE

## 2021-11-15 PROCEDURE — 83690 ASSAY OF LIPASE: CPT | Performed by: EMERGENCY MEDICINE

## 2021-11-15 PROCEDURE — 87636 SARSCOV2 & INF A&B AMP PRB: CPT | Performed by: EMERGENCY MEDICINE

## 2021-11-15 PROCEDURE — C9803 HOPD COVID-19 SPEC COLLECT: HCPCS | Performed by: EMERGENCY MEDICINE

## 2021-11-15 PROCEDURE — 71046 X-RAY EXAM CHEST 2 VIEWS: CPT

## 2021-11-15 PROCEDURE — 82040 ASSAY OF SERUM ALBUMIN: CPT | Performed by: EMERGENCY MEDICINE

## 2021-11-15 PROCEDURE — 93010 ELECTROCARDIOGRAM REPORT: CPT | Performed by: EMERGENCY MEDICINE

## 2021-11-15 PROCEDURE — 84484 ASSAY OF TROPONIN QUANT: CPT | Performed by: EMERGENCY MEDICINE

## 2021-11-15 RX ORDER — ACETAMINOPHEN 500 MG
1000 TABLET ORAL ONCE
Status: COMPLETED | OUTPATIENT
Start: 2021-11-15 | End: 2021-11-15

## 2021-11-15 RX ORDER — LEVOFLOXACIN 750 MG/1
750 TABLET, FILM COATED ORAL DAILY
Qty: 5 TABLET | Refills: 0 | Status: SHIPPED | OUTPATIENT
Start: 2021-11-15 | End: 2021-11-20

## 2021-11-15 RX ADMIN — ACETAMINOPHEN 1000 MG: 500 TABLET ORAL at 14:02

## 2021-11-15 NOTE — ED TRIAGE NOTES
Patient presents today with flu-like symptoms. Patient reports having cough, fever, chest pain, and sore throat that started yesterday. Medial 10/10 Chest pain with headache.

## 2021-11-16 LAB — BACTERIA UR CULT: NORMAL

## 2021-11-16 NOTE — TELEPHONE ENCOUNTER
"Last Written Prescription Date:    omeprazole (PRILOSEC) 20 MG capsule 90 capsule 2 2/1/2021  No   Sig: TAKE 1 CAPSULE BY MOUTH EVERY DAY   Sent to pharmacy as: omeprazole 20 mg capsule,delayed release (PriLOSEC)   E-Prescribing Status: Receipt confirmed by pharmacy (2/1/2021  3:53 PM CST)     Last office visit provider:  8/16/21     Requested Prescriptions   Pending Prescriptions Disp Refills     omeprazole (PRILOSEC) 20 MG DR capsule [Pharmacy Med Name: OMEPRAZOLE DR 20 MG CAPSULE] 90 capsule 2     Sig: TAKE 1 CAPSULE BY MOUTH EVERY DAY       PPI Protocol Passed - 11/14/2021 12:20 AM        Passed - Not on Clopidogrel (unless Pantoprazole ordered)        Passed - No diagnosis of osteoporosis on record        Passed - Recent (12 mo) or future (30 days) visit within the authorizing provider's specialty     Patient has had an office visit with the authorizing provider or a provider within the authorizing providers department within the previous 12 mos or has a future within next 30 days. See \"Patient Info\" tab in inbasket, or \"Choose Columns\" in Meds & Orders section of the refill encounter.              Passed - Medication is active on med list        Passed - Patient is age 18 or older        Passed - No active pregnacy on record        Passed - No positive pregnancy test in past 12 months             Tosin Price RN 11/15/21 10:51 PM    "

## 2021-11-16 NOTE — DISCHARGE INSTRUCTIONS
Please make an appointment to follow up with Your Primary Care Provider as soon as possible.    Take the levaquin antibiotic as prescribed. Can take tylenol at home for pain or fever.     If your oxygen saturation falls to 90 or below return to the ED.     Return to the ED if you develop worsening pain, fever, shortness of breath, chest pain, confusion, light headedness, or any new or worsening concerns.     Return to the

## 2021-11-17 LAB
ATRIAL RATE - MUSE: 80 BPM
DIASTOLIC BLOOD PRESSURE - MUSE: NORMAL MMHG
INTERPRETATION ECG - MUSE: NORMAL
P AXIS - MUSE: 61 DEGREES
PR INTERVAL - MUSE: 178 MS
QRS DURATION - MUSE: 82 MS
QT - MUSE: 384 MS
QTC - MUSE: 442 MS
R AXIS - MUSE: -47 DEGREES
SYSTOLIC BLOOD PRESSURE - MUSE: NORMAL MMHG
T AXIS - MUSE: 20 DEGREES
VENTRICULAR RATE- MUSE: 80 BPM

## 2021-11-18 ENCOUNTER — HOSPITAL ENCOUNTER (EMERGENCY)
Facility: CLINIC | Age: 86
Discharge: HOME | End: 2021-11-18
Attending: FAMILY MEDICINE | Admitting: FAMILY MEDICINE
Payer: COMMERCIAL

## 2021-11-18 ENCOUNTER — PATIENT OUTREACH (OUTPATIENT)
Dept: GERIATRIC MEDICINE | Facility: CLINIC | Age: 86
End: 2021-11-18

## 2021-11-18 VITALS
DIASTOLIC BLOOD PRESSURE: 83 MMHG | HEART RATE: 86 BPM | OXYGEN SATURATION: 99 % | RESPIRATION RATE: 18 BRPM | SYSTOLIC BLOOD PRESSURE: 153 MMHG | TEMPERATURE: 98 F

## 2021-11-18 DIAGNOSIS — J18.1 UNRESOLVED LOBAR PNEUMONIA (H): ICD-10-CM

## 2021-11-18 DIAGNOSIS — I44.4 LEFT ANTERIOR FASCICULAR BLOCK: ICD-10-CM

## 2021-11-18 DIAGNOSIS — R07.89 CHEST WALL PAIN: ICD-10-CM

## 2021-11-18 DIAGNOSIS — R07.89 OTHER CHEST PAIN: ICD-10-CM

## 2021-11-18 DIAGNOSIS — J18.9 PNEUMONIA OF LOWER LOBE DUE TO INFECTIOUS ORGANISM, UNSPECIFIED LATERALITY: ICD-10-CM

## 2021-11-18 LAB
ALBUMIN SERPL-MCNC: 3.5 G/DL (ref 3.4–5)
ALBUMIN UR-MCNC: NEGATIVE MG/DL
ALP SERPL-CCNC: 68 U/L (ref 40–150)
ALT SERPL W P-5'-P-CCNC: 20 U/L (ref 0–50)
ANION GAP SERPL CALCULATED.3IONS-SCNC: 7 MMOL/L (ref 3–14)
APPEARANCE UR: CLEAR
AST SERPL W P-5'-P-CCNC: 23 U/L (ref 0–45)
ATRIAL RATE - MUSE: 81 BPM
BASOPHILS # BLD AUTO: 0 10E3/UL (ref 0–0.2)
BASOPHILS NFR BLD AUTO: 1 %
BILIRUB SERPL-MCNC: 0.5 MG/DL (ref 0.2–1.3)
BILIRUB UR QL STRIP: NEGATIVE
BUN SERPL-MCNC: 10 MG/DL (ref 7–30)
CALCIUM SERPL-MCNC: 9.2 MG/DL (ref 8.5–10.1)
CHLORIDE BLD-SCNC: 102 MMOL/L (ref 94–109)
CO2 SERPL-SCNC: 26 MMOL/L (ref 20–32)
COLOR UR AUTO: ABNORMAL
CREAT SERPL-MCNC: 0.82 MG/DL (ref 0.52–1.04)
DIASTOLIC BLOOD PRESSURE - MUSE: NORMAL MMHG
EOSINOPHIL # BLD AUTO: 0.1 10E3/UL (ref 0–0.7)
EOSINOPHIL NFR BLD AUTO: 2 %
ERYTHROCYTE [DISTWIDTH] IN BLOOD BY AUTOMATED COUNT: 13 % (ref 10–15)
GFR SERPL CREATININE-BSD FRML MDRD: 63 ML/MIN/1.73M2
GLUCOSE BLD-MCNC: 100 MG/DL (ref 70–99)
GLUCOSE UR STRIP-MCNC: NEGATIVE MG/DL
HCT VFR BLD AUTO: 42.1 % (ref 35–47)
HGB BLD-MCNC: 14.3 G/DL (ref 11.7–15.7)
HGB UR QL STRIP: ABNORMAL
HOLD SPECIMEN: NORMAL
HOLD SPECIMEN: NORMAL
IMM GRANULOCYTES # BLD: 0 10E3/UL
IMM GRANULOCYTES NFR BLD: 0 %
INTERPRETATION ECG - MUSE: NORMAL
KETONES UR STRIP-MCNC: NEGATIVE MG/DL
LACTATE SERPL-SCNC: 1.3 MMOL/L (ref 0.7–2)
LEUKOCYTE ESTERASE UR QL STRIP: NEGATIVE
LYMPHOCYTES # BLD AUTO: 2.2 10E3/UL (ref 0.8–5.3)
LYMPHOCYTES NFR BLD AUTO: 40 %
MCH RBC QN AUTO: 29.4 PG (ref 26.5–33)
MCHC RBC AUTO-ENTMCNC: 34 G/DL (ref 31.5–36.5)
MCV RBC AUTO: 86 FL (ref 78–100)
MONOCYTES # BLD AUTO: 0.5 10E3/UL (ref 0–1.3)
MONOCYTES NFR BLD AUTO: 8 %
NEUTROPHILS # BLD AUTO: 2.8 10E3/UL (ref 1.6–8.3)
NEUTROPHILS NFR BLD AUTO: 49 %
NITRATE UR QL: NEGATIVE
NRBC # BLD AUTO: 0 10E3/UL
NRBC BLD AUTO-RTO: 0 /100
P AXIS - MUSE: NORMAL DEGREES
PH UR STRIP: 6 [PH] (ref 5–7)
PLATELET # BLD AUTO: 169 10E3/UL (ref 150–450)
POTASSIUM BLD-SCNC: 3.3 MMOL/L (ref 3.4–5.3)
PR INTERVAL - MUSE: 136 MS
PROCALCITONIN SERPL-MCNC: <0.05 NG/ML
PROT SERPL-MCNC: 8.5 G/DL (ref 6.8–8.8)
QRS DURATION - MUSE: 84 MS
QT - MUSE: 352 MS
QTC - MUSE: 408 MS
R AXIS - MUSE: -55 DEGREES
RBC # BLD AUTO: 4.87 10E6/UL (ref 3.8–5.2)
RBC URINE: 1 /HPF
SODIUM SERPL-SCNC: 135 MMOL/L (ref 133–144)
SP GR UR STRIP: 1.01 (ref 1–1.03)
SQUAMOUS EPITHELIAL: 1 /HPF
SYSTOLIC BLOOD PRESSURE - MUSE: NORMAL MMHG
T AXIS - MUSE: -33 DEGREES
TROPONIN I SERPL-MCNC: <0.015 UG/L (ref 0–0.04)
UROBILINOGEN UR STRIP-MCNC: NORMAL MG/DL
VENTRICULAR RATE- MUSE: 81 BPM
WBC # BLD AUTO: 5.7 10E3/UL (ref 4–11)
WBC URINE: 1 /HPF

## 2021-11-18 PROCEDURE — 83605 ASSAY OF LACTIC ACID: CPT | Performed by: FAMILY MEDICINE

## 2021-11-18 PROCEDURE — 81001 URINALYSIS AUTO W/SCOPE: CPT | Performed by: FAMILY MEDICINE

## 2021-11-18 PROCEDURE — 85025 COMPLETE CBC W/AUTO DIFF WBC: CPT | Performed by: FAMILY MEDICINE

## 2021-11-18 PROCEDURE — 93010 ELECTROCARDIOGRAM REPORT: CPT | Performed by: FAMILY MEDICINE

## 2021-11-18 PROCEDURE — 36415 COLL VENOUS BLD VENIPUNCTURE: CPT | Performed by: FAMILY MEDICINE

## 2021-11-18 PROCEDURE — 93005 ELECTROCARDIOGRAM TRACING: CPT

## 2021-11-18 PROCEDURE — 99285 EMERGENCY DEPT VISIT HI MDM: CPT | Mod: 25 | Performed by: FAMILY MEDICINE

## 2021-11-18 PROCEDURE — 80053 COMPREHEN METABOLIC PANEL: CPT | Performed by: FAMILY MEDICINE

## 2021-11-18 PROCEDURE — 84145 PROCALCITONIN (PCT): CPT | Performed by: FAMILY MEDICINE

## 2021-11-18 PROCEDURE — 250N000013 HC RX MED GY IP 250 OP 250 PS 637: Performed by: FAMILY MEDICINE

## 2021-11-18 PROCEDURE — 84484 ASSAY OF TROPONIN QUANT: CPT | Performed by: FAMILY MEDICINE

## 2021-11-18 PROCEDURE — 99285 EMERGENCY DEPT VISIT HI MDM: CPT | Mod: 25

## 2021-11-18 RX ORDER — ACETAMINOPHEN 500 MG
1000 TABLET ORAL ONCE
Status: COMPLETED | OUTPATIENT
Start: 2021-11-18 | End: 2021-11-18

## 2021-11-18 RX ADMIN — ACETAMINOPHEN 1000 MG: 500 TABLET ORAL at 16:03

## 2021-11-18 RX ADMIN — BENZOCAINE AND MENTHOL 1 LOZENGE: 15; 3.6 LOZENGE ORAL at 16:04

## 2021-11-18 ASSESSMENT — ENCOUNTER SYMPTOMS
COUGH: 1
SHORTNESS OF BREATH: 0
HEADACHES: 1
FATIGUE: 1
DIZZINESS: 1
SORE THROAT: 1

## 2021-11-18 NOTE — ED PROVIDER NOTES
"    [facility] EMERGENCY DEPARTMENT ([facility])       11/18/21  History     Chief Complaint   Patient presents with     Chest Pain     chest pain, diziness, pt was at the ER two days ago, abx was prescriped for 5 days. no improvement yet. Vs stable, no SOB noted     The history is provided by the patient.     Radha Mcmanus is a 89 year old female with a recent pneumonia diagnosis on 11/15/2021 who presents to the Emergency Department for evaluation of chest pain, cough, sore throat, dizziness, headaches, and acid reflux.  Patient presents to the ED with her daughter.  An  was used.  Patient reports she presented to the hospital 3 days ago, was diagnosed with pneumonia and prescribed 5-day course of levofloxacin, patient has 2 days left of antibiotic course.  She reports experiencing dizziness, slight cough, and chest pain associated with deep breathing.  She denies experiencing dysuria.  She states that her urine volume is \"too much\".  She reports experiencing a subjective fever, sore throat, dizziness and a headache.  She denies experiencing loss of taste or smell. She reports experiencing acid reflux.  She reports being tested for COVID-19 and influenza at her last ED visit, both tested negative.  She states that she uses an  \"inhaler\" for her stuffy nose every day.  Her daughter states that patient takes 2 tablets of Tylenol every morning, with some relief, last dose was this morning. Daughter reports that she is sick and experiencing similar symptoms.    Past Medical History  Past Medical History:   Diagnosis Date     Hypertension      Ulcer      Past Surgical History:   Procedure Laterality Date     ORTHOPEDIC SURGERY       acetaminophen (TYLENOL) 500 MG tablet  amLODIPine (NORVASC) 5 MG tablet  aspirin (ASA) 81 MG EC tablet  Calcium Citrate-Vitamin D 250-200 MG-UNIT TABS  cetirizine (ZYRTEC) 10 MG tablet  fluticasone (FLONASE) 50 MCG/ACT nasal spray  levofloxacin (LEVAQUIN) 750 MG " tablet  losartan (COZAAR) 25 MG tablet  meclizine (ANTIVERT) 25 MG tablet  omeprazole (PRILOSEC) 20 MG DR capsule  polyethylene glycol (MIRALAX/GLYCOLAX) Packet  psyllium 0.52 G capsule  OMEPRAZOLE PO  ORDER FOR DME      Allergies   Allergen Reactions     Contrast Dye      Erythromycin      Family History  Family History   Problem Relation Age of Onset     No Known Problems Mother      No Known Problems Father      Social History   Social History     Tobacco Use     Smoking status: Never Smoker     Smokeless tobacco: Never Used   Substance Use Topics     Alcohol use: Never     Drug use: Never      Past medical history, past surgical history, medications, allergies, family history, and social history were reviewed with the patient. No additional pertinent items.     I have reviewed the Medications, Allergies, Past Medical and Surgical History, and Social History in the Epic system.    Review of Systems   Constitutional: Positive for fatigue.   HENT: Positive for sore throat.    Respiratory: Positive for cough. Negative for shortness of breath.    Cardiovascular: Positive for chest pain.   Genitourinary:        Increased urine output   Neurological: Positive for dizziness and headaches.     A complete review of systems was performed with pertinent positives and negatives noted in the HPI, and all other systems negative.    Physical Exam   BP: 133/82  Pulse: 85  Temp: 98.4  F (36.9  C)  Resp: 20  SpO2: 98 %      Physical Exam  Vitals and nursing note reviewed.   Constitutional:       General: She is not in acute distress.     Appearance: She is not diaphoretic.   HENT:      Head: Atraumatic.      Mouth/Throat:      Pharynx: No oropharyngeal exudate.   Eyes:      General: No scleral icterus.     Pupils: Pupils are equal, round, and reactive to light.   Cardiovascular:      Rate and Rhythm: Normal rate.      Pulses:           Carotid pulses are 2+ on the right side and 2+ on the left side.       Radial pulses are 2+ on  the right side and 2+ on the left side.        Dorsalis pedis pulses are 2+ on the right side and 2+ on the left side.        Posterior tibial pulses are 2+ on the right side and 2+ on the left side.      Heart sounds: Normal heart sounds.   Pulmonary:      Effort: No respiratory distress.      Breath sounds: Normal breath sounds.   Chest:      Chest wall: Tenderness present. No crepitus.       Abdominal:      General: Bowel sounds are normal.      Palpations: Abdomen is soft.      Tenderness: There is no abdominal tenderness.   Musculoskeletal:         General: No tenderness.      Cervical back: Neck supple.      Right lower leg: No edema.      Left lower leg: No edema.   Skin:     General: Skin is warm.      Findings: No rash.   Neurological:      General: No focal deficit present.      Mental Status: She is oriented to person, place, and time.         ED Course     At 2:20 PM the patient was seen and examined by Catalino Morocho MD in Room ED06.        Procedures            EKG Interpretation:      Interpreted by Catalino Morocho MD  Time reviewed: 1315  Symptoms at time of EKG: Chest pain for several days with fever  Rhythm: normal sinus   Rate: Normal  Axis: Left Axis Deviation  Ectopy: none  Conduction: left anterior fasciclar block  ST Segments/ T Waves: Non-specific ST-T wave changes  Q Waves: none  Comparison to prior: Unchanged from 11/15/2021    Clinical Impression: no acute changes and non-specific EKG                The medical record was reviewed and interpreted.  Current labs reviewed and interpreted.  Previous labs reviewed and interpreted.  Previous images reviewed and interpreted: CXR.   utilized.         Results for orders placed or performed during the hospital encounter of 11/18/21 (from the past 24 hour(s))   EKG 12-lead, tracing only   Result Value Ref Range    Systolic Blood Pressure  mmHg    Diastolic Blood Pressure  mmHg    Ventricular Rate 81 BPM    Atrial Rate 81 BPM    MO Interval  136 ms    QRS Duration 84 ms     ms    QTc 408 ms    P Axis  degrees    R AXIS -55 degrees    T Axis -33 degrees    Interpretation ECG       Sinus rhythm  Left axis deviation  ST & T wave abnormality, consider anterior ischemia  Abnormal ECG  Unconfirmed report - interpretation of this ECG is computer generated - see medical record for final interpretation  Confirmed by - EMERGENCY ROOM, PHYSICIAN ([id number]),  LOLA REID ([id number]) on 11/18/2021 1:40:31 PM     Extra Tube    Narrative    The following orders were created for panel order Extra Tube.  Procedure                               Abnormality         Status                     ---------                               -----------         ------                     Extra Red Top Tube[[id number]]                               Final result               Extra Green Top (Lithium...[[id number]]                      Final result                 Please view results for these tests on the individual orders.   Extra Red Top Tube   Result Value Ref Range    Hold Specimen JIC    Extra Green Top (Lithium Heparin) Tube   Result Value Ref Range    Hold Specimen JIC    CBC with platelets differential    Narrative    The following orders were created for panel order CBC with platelets differential.  Procedure                               Abnormality         Status                     ---------                               -----------         ------                     CBC with platelets and d...[[id number]]                      Final result                 Please view results for these tests on the individual orders.   Lactic acid whole blood   Result Value Ref Range    Lactic Acid 1.3 0.7 - 2.0 mmol/L   Procalcitonin   Result Value Ref Range    Procalcitonin <0.05 <0.05 ng/mL   CBC with platelets and differential   Result Value Ref Range    WBC Count 5.7 4.0 - 11.0 10e3/uL    RBC Count 4.87 3.80 - 5.20 10e6/uL    Hemoglobin 14.3 11.7 - 15.7 g/dL    Hematocrit 42.1  35.0 - 47.0 %    MCV 86 78 - 100 fL    MCH 29.4 26.5 - 33.0 pg    MCHC 34.0 31.5 - 36.5 g/dL    RDW 13.0 10.0 - 15.0 %    Platelet Count 169 150 - 450 10e3/uL    % Neutrophils 49 %    % Lymphocytes 40 %    % Monocytes 8 %    % Eosinophils 2 %    % Basophils 1 %    % Immature Granulocytes 0 %    NRBCs per 100 WBC 0 <1 /100    Absolute Neutrophils 2.8 1.6 - 8.3 10e3/uL    Absolute Lymphocytes 2.2 0.8 - 5.3 10e3/uL    Absolute Monocytes 0.5 0.0 - 1.3 10e3/uL    Absolute Eosinophils 0.1 0.0 - 0.7 10e3/uL    Absolute Basophils 0.0 0.0 - 0.2 10e3/uL    Absolute Immature Granulocytes 0.0 <=0.0 10e3/uL    Absolute NRBCs 0.0 10e3/uL   UA with Microscopic reflex to Culture    Specimen: Urine, Clean Catch   Result Value Ref Range    Color Urine Light Yellow Colorless, Straw, Light Yellow, Yellow    Appearance Urine Clear Clear    Glucose Urine Negative Negative mg/dL    Bilirubin Urine Negative Negative    Ketones Urine Negative Negative mg/dL    Specific Gravity Urine 1.008 1.003 - 1.035    Blood Urine Trace (A) Negative    pH Urine 6.0 5.0 - 7.0    Protein Albumin Urine Negative Negative mg/dL    Urobilinogen Urine Normal Normal, 2.0 mg/dL    Nitrite Urine Negative Negative    Leukocyte Esterase Urine Negative Negative    RBC Urine 1 <=2 /HPF    WBC Urine 1 <=5 /HPF    Squamous Epithelials Urine 1 <=1 /HPF    Narrative    Urine Culture not indicated     Medications   acetaminophen (TYLENOL) tablet 1,000 mg (1,000 mg Oral Given 11/18/21 1603)   benzocaine-menthol (CEPACOL) 15-3.6 MG lozenge 1 lozenge (1 lozenge Buccal Given 11/18/21 1604)             Assessments & Plan (with Medical Decision Making)   89-year-old patient with a prior history of hypertension presenting due to persistent sore throat, chest discomfort, fever and cough of several days duration.  Initial differential diagnosis includes ongoing symptoms related to recently diagnosed pneumonia (patient evaluated in ED 72 hours ago with chest x-ray showing evidence  of pneumonia and initiated treatment with Levaquin), other viral respiratory infection (patient was Covid and influenza negative on 11/15/2021), less likely ACS (negative cardiac work-up 11/15/2021), PE, pericarditis, myocarditis, esophagitis, costochondritis.  On exam the patient is afebrile, vitals normal, no respiratory distress, oxygen saturation 98% on room air.  Converses to me through the assistance of an  in complete sentences.  There is reproducible discomfort in the chest with palpation and with deep inspiration.  Her cardiovascular exam is otherwise normal, decreased lung sounds at the bases otherwise clear lungs.  Voice normal.  No lower extremity edema or tenderness, benign abdominal exam, remainder physical exam unremarkable.  An EKG was repeated, this is nonspecific but shows no acute changes.  Labs significant for no elevation of white count, lactate normal, pro calcitonin normal, troponin 0.  Repeat chest x-ray was refused by the patient, but as the patient is not hypoxic, unremarkable exam, otherwise normal labs, I feel she is still safe to be discharged.  The patient's symptoms appear consistent with her already known diagnosis of pneumonia and some associated pleuritis and chest wall pain. Patient is not hypoxic, does not appear septic, has no elevation of troponin or EKG change despite several days of symptoms. Patient continues to appear appropriate for outpatient management, will use Tylenol, finish course of Levaquin, follow-up at the clinic.  We discussed the indications for emergency department return and follow-up.  Stable for discharge.    I have reviewed the nursing notes.    I have reviewed the findings, diagnosis, plan and need for follow up with the patient.    New Prescriptions    No medications on file       Final diagnoses:   Pneumonia of lower lobe due to infectious organism, unspecified laterality   Chest wall pain       IDaljit am serving as a trained  medical scribe to document services personally performed by Catalino Morocho MD, based on the provider's statements to me.      I, Catalino Morocho MD, was physically present and have reviewed and verified the accuracy of this note documented by Daljit Frost.     Catalino Morocho MD  11/18/2021   [facility] EMERGENCY DEPARTMENT     Catalino Morocho MD  11/18/21 1813

## 2021-11-18 NOTE — ED PROVIDER NOTES
"ED Provider Note  [facility]      History     Chief Complaint   Patient presents with     Cough     Patient presents today after having fever, cough, sore throat and chest pain that started yesterday. Family is requesting patient be tested for COVID and Flu     Chest Pain     HPI  Radha Mcmanus is a 89 year old Bangladeshi speaking female with history of allergic rhinitis, GERD, HTN, chronic constipation who presents with her daughter for evaluation of cough, subjective fever starting yesterday. Bangladeshi IPAD  was used for the history and physical exam and explanation of the results and plan. Patient reports that she has been experiencing a cough for the past day. She reports clear sputum with the cough. She report she will get some mild chest pain with the cough only. No other chest pain. She denies any pleuritic nature to the pain. She reports subjective fever but denies taking her temperature at home. She reports taking tylenol at 7 am this morning. She denies any shortness of breath. No leg swelling. No nausea, vomiting, or diarrhea. No loss of taste or smell. She reports chronic epigastric \"gas\" pain that is unchanged. No significant abdominal pain currently. She was vaccinated with Moderna for COVID 19 in February and March of 2021. She has not received her booster. She has not received her flu immunization. No known sick contacts. She also reports headaches. Denies current chest pain. No numbness, tingling, or weakness. No blurry vision. No confusion per daughter. No history of DVT or PE. Daughter denies any known cardiac history. No family history of cardiac disease. No smoking. No history of pulmonary disease per daughter. No recent travel.     Past Medical History  Past Medical History:   Diagnosis Date     Hypertension      Ulcer      Past Surgical History:   Procedure Laterality Date     ORTHOPEDIC SURGERY       acetaminophen (TYLENOL) 500 MG tablet  levofloxacin (LEVAQUIN) " 750 MG tablet  amLODIPine (NORVASC) 5 MG tablet  aspirin (ASA) 81 MG EC tablet  Calcium Citrate-Vitamin D 250-200 MG-UNIT TABS  cetirizine (ZYRTEC) 10 MG tablet  fluticasone (FLONASE) 50 MCG/ACT nasal spray  losartan (COZAAR) 25 MG tablet  meclizine (ANTIVERT) 25 MG tablet  omeprazole (PRILOSEC) 20 MG DR capsule  OMEPRAZOLE PO  ORDER FOR DME  polyethylene glycol (MIRALAX/GLYCOLAX) Packet  psyllium 0.52 G capsule      Allergies   Allergen Reactions     Contrast Dye      Erythromycin      Family History  Family History   Problem Relation Age of Onset     No Known Problems Mother      No Known Problems Father      Social History   Social History     Tobacco Use     Smoking status: Never Smoker     Smokeless tobacco: Never Used   Substance Use Topics     Alcohol use: Never     Drug use: Never      Past medical history, past surgical history, medications, allergies, family history, and social history were reviewed with the patient. No additional pertinent items.       Review of Systems  A complete review of systems was performed with pertinent positives and negatives noted in the HPI, and all other systems negative.    Physical Exam   BP: (!) 143/85  Pulse: 85  Temp: 98  F (36.7  C)  Resp: 18  Weight: 62.1 kg (137 lb)  SpO2: 99 %  Physical Exam  Vitals reviewed.   Constitutional:       General: She is not in acute distress.     Appearance: She is well-developed.      Comments: Appears younger than stated age   HENT:      Head: Normocephalic and atraumatic.      Mouth/Throat:      Mouth: Mucous membranes are moist.      Pharynx: No oropharyngeal exudate or posterior oropharyngeal erythema.   Eyes:      Extraocular Movements: Extraocular movements intact.      Conjunctiva/sclera: Conjunctivae normal.      Pupils: Pupils are equal, round, and reactive to light.   Cardiovascular:      Rate and Rhythm: Normal rate and regular rhythm.      Pulses: Normal pulses.      Heart sounds: Normal heart sounds. No murmur  heard.      Pulmonary:      Effort: Pulmonary effort is normal. No respiratory distress.      Breath sounds: Normal breath sounds. No stridor. No wheezing, rhonchi or rales.      Comments: Intermittent mild cough. Speaking in full sentences. No tachypnea or accessory muscle use. Satting normal on room air.   Abdominal:      General: Bowel sounds are normal. There is no distension.      Palpations: Abdomen is soft. There is no mass.      Tenderness: There is no abdominal tenderness. There is no guarding or rebound.   Musculoskeletal:         General: No swelling or tenderness. Normal range of motion.      Cervical back: Normal range of motion and neck supple. No rigidity.   Skin:     General: Skin is warm and dry.      Capillary Refill: Capillary refill takes less than 2 seconds.      Findings: No rash.   Neurological:      General: No focal deficit present.      Mental Status: She is alert and oriented to person, place, and time.      GCS: GCS eye subscore is 4. GCS verbal subscore is 5. GCS motor subscore is 6.      Cranial Nerves: No cranial nerve deficit.      Sensory: No sensory deficit.      Motor: No weakness.      Coordination: Coordination normal.   Psychiatric:         Mood and Affect: Mood normal.         ED Course      Procedures            EKG Interpretation:      Interpreted by Sydni Doll MD  Time reviewed: 12:40 pm  Symptoms at time of EKG: chest pain with coughing   Rhythm: normal sinus   Rate: normal  Axis: left  Ectopy: none  Conduction: normal  ST Segments/ T Waves: Non specific ST-T wave changes  Q Waves: none  Comparison to prior: Unchanged from 12/17/19    Clinical Impression: no evidence of acute ischemia         The medical record was reviewed and interpreted.  Current labs reviewed and interpreted.  Current images reviewed and interpreted: as below.  EKG reviewed and interpreted: as above.  Managed outpatient prescription medications.   utilized.       Results for orders  placed or performed during the hospital encounter of 11/15/21   XR Chest 2 Views     Status: None    Narrative    XR CHEST 2 VW 11/15/2021 2:26 PM    HISTORY: cough, fever, eval for pneumonia    COMPARISON: 2/15/2017      Impression    IMPRESSION: Mild linear opacities in the lung bases bilaterally either  due to atelectasis, mild nonspecific fibrosis or developing  infiltrate. Mild emphysema. No pleural effusion or pneumothorax. Old  left-sided rib fracture deformities. Interval progression of moderate  wedge compression deformities in the upper lumbar spine.    RODRIGO AGGARWAL MD         SYSTEM ID:  [id number]   Symptomatic Influenza A/B & SARS-CoV2 (COVID-19) Virus PCR Multiplex Nasopharyngeal     Status: Normal    Specimen: Nasopharyngeal; Swab   Result Value Ref Range    Influenza A target Negative Negative    Influenza B target Negative Negative    SARS CoV2 PCR Negative Negative    Narrative    Testing was performed using the lamar SARS-CoV-2 & Influenza A/B Assay on the lamar Cleopatra System. This test should be ordered for the detection of SARS-CoV-2 and influenza viruses in individuals who meet clinical and/or epidemiological criteria. Test performance is unknown in asymptomatic patients. This test is for in vitro diagnostic use under the FDA EUA for laboratories certified under CLIA to perform moderate and/or high complexity testing. This test has not been FDA cleared or approved. A negative result does not rule out the presence of PCR inhibitors in the specimen or target RNA in concentration below the limit of detection for the assay. If only one viral target is positive but coinfection with multiple targets is suspected, the sample should be re-tested with another FDA cleared, approved or authorized test, if coinfection would change clinical management. [facility] are certified under the Clinical Laboratory Improvement Amendments of 1988 (CLIA-88) as  qualified to perform moderate and/or  high complexity laboratory testing.   Comprehensive metabolic panel     Status: Abnormal   Result Value Ref Range    Sodium 136 133 - 144 mmol/L    Potassium 3.5 3.4 - 5.3 mmol/L    Chloride 102 94 - 109 mmol/L    Carbon Dioxide (CO2) 28 20 - 32 mmol/L    Anion Gap 6 3 - 14 mmol/L    Urea Nitrogen 10 7 - 30 mg/dL    Creatinine 0.67 0.52 - 1.04 mg/dL    Calcium 10.0 8.5 - 10.1 mg/dL    Glucose 86 70 - 99 mg/dL    Alkaline Phosphatase 78 40 - 150 U/L    AST 22 0 - 45 U/L    ALT 24 0 - 50 U/L    Protein Total 9.0 (H) 6.8 - 8.8 g/dL    Albumin 3.7 3.4 - 5.0 g/dL    Bilirubin Total 0.5 0.2 - 1.3 mg/dL    GFR Estimate 78 >60 mL/min/1.73m2   Troponin I     Status: Normal   Result Value Ref Range    Troponin I <0.015 0.000 - 0.045 ug/L   UA with Microscopic reflex to Culture     Status: Abnormal    Specimen: Urine, Midstream   Result Value Ref Range    Color Urine Yellow Colorless, Straw, Light Yellow, Yellow    Appearance Urine Slightly Cloudy (A) Clear    Glucose Urine Negative Negative mg/dL    Bilirubin Urine Negative Negative    Ketones Urine Trace (A) Negative mg/dL    Specific Gravity Urine 1.015 1.003 - 1.035    Blood Urine Small (A) Negative    pH Urine 5.5 5.0 - 7.0    Protein Albumin Urine 30  (A) Negative mg/dL    Urobilinogen Urine Normal Normal, 2.0 mg/dL    Nitrite Urine Negative Negative    Leukocyte Esterase Urine Moderate (A) Negative    Bacteria Urine Few (A) None Seen /HPF    Mucus Urine Present (A) None Seen /LPF    RBC Urine 6 (H) <=2 /HPF    WBC Urine 91 (H) <=5 /HPF    Squamous Epithelials Urine 23 (H) <=1 /HPF    Transitional Epithelials Urine 4 (H) <=1 /HPF    Hyaline Casts Urine 3 (H) <=2 /LPF    Narrative    Urine Culture ordered based on laboratory criteria   CBC with platelets and differential     Status: None   Result Value Ref Range    WBC Count 5.5 4.0 - 11.0 10e3/uL    RBC Count 4.74 3.80 - 5.20 10e6/uL    Hemoglobin 13.8 11.7 - 15.7 g/dL    Hematocrit 41.8 35.0 - 47.0 %    MCV 88 78 - 100  fL    MCH 29.1 26.5 - 33.0 pg    MCHC 33.0 31.5 - 36.5 g/dL    RDW 13.2 10.0 - 15.0 %    Platelet Count 175 150 - 450 10e3/uL    % Neutrophils 48 %    % Lymphocytes 37 %    % Monocytes 9 %    % Eosinophils 5 %    % Basophils 1 %    % Immature Granulocytes 0 %    NRBCs per 100 WBC 0 <1 /100    Absolute Neutrophils 2.6 1.6 - 8.3 10e3/uL    Absolute Lymphocytes 2.0 0.8 - 5.3 10e3/uL    Absolute Monocytes 0.5 0.0 - 1.3 10e3/uL    Absolute Eosinophils 0.3 0.0 - 0.7 10e3/uL    Absolute Basophils 0.1 0.0 - 0.2 10e3/uL    Absolute Immature Granulocytes 0.0 <=0.0 10e3/uL    Absolute NRBCs 0.0 10e3/uL   Lipase     Status: Normal   Result Value Ref Range    Lipase 157 73 - 393 U/L   EKG 12 lead     Status: None   Result Value Ref Range    Systolic Blood Pressure  mmHg    Diastolic Blood Pressure  mmHg    Ventricular Rate 80 BPM    Atrial Rate 80 BPM    ME Interval 178 ms    QRS Duration 82 ms     ms    QTc 442 ms    P Axis 61 degrees    R AXIS -47 degrees    T Axis 20 degrees    Interpretation ECG       Sinus rhythm  Left axis deviation  Nonspecific T wave abnormality  Abnormal ECG  Unconfirmed report - interpretation of this ECG is computer generated - see medical record for final interpretation    Confirmed by - EMERGENCY ROOM, PHYSICIAN ([id number]),  JASPREET JACKSON ([id number]) on 11/17/2021 10:06:12 AM     Urine Culture     Status: None    Specimen: Urine, Midstream   Result Value Ref Range    Culture <10,000 CFU/mL Mixture of urogenital richard    CBC with platelets differential     Status: None    Narrative    The following orders were created for panel order CBC with platelets differential.  Procedure                               Abnormality         Status                     ---------                               -----------         ------                     CBC with platelets and d...[[id number]]                      Final result                 Please view results for these tests on the individual orders.      Medications   acetaminophen (TYLENOL) tablet 1,000 mg (1,000 mg Oral Given 11/15/21 1402)        Assessments & Plan (with Medical Decision Making)   Patient presents with upper respiratory symptoms.  On exam, she is overall well-appearing and in no acute distress.  Her vital signs are within normal limits with the exception of some mild hypertension which she has history of.  She is afebrile.  She has no signs of respiratory distress.  She presents with her daughter asking to be tested for COVID-19 as well as influenza.  She reports chest pain but clarifies that this only occurs when she coughs and does not have any current chest pain.  She denies any pleuritic nature to the chest pain.  She denies any shortness of breath.    EKG was obtained which revealed normal sinus rhythm with left axis deviation with nonspecific ST T wave abnormality that is unchanged from prior EKGs.  No signs of acute ischemia.  Her symptoms have now been ongoing for the past 24 hours.  Her report of chest pain only when she coughs would make acute coronary syndrome less likely but is on the differential.  Differential diagnosis includes was not limited to COVID-19 infection, other viral syndrome, pneumonia, pneumothorax, GERD.     Laboratory studies were initiated.  Two-view chest x-ray was obtained.  COVID-19 and influenza PCR were obtained.  Patient was given Tylenol as she was reporting a current headache.  She has no neurologic dysfunction or neurologic symptoms to suggest concerning etiologies for headache at this time.  Do feel this is likely related to her upper respiratory syndrome.    COVID-19 and influenza PCR were negative.  Troponin was less than 0.015 making acute coronary syndrome less likely.  Lipase is within normal limits.  CMP is within normal limits.  CBC is also within normal limits with a normal white blood cell count of 5.5 and hemoglobin of 13.8.  Urinalysis was ordered however the patient had not yet provided  this.  Chest x-ray revealed Mild linear opacities in the lung bases bilaterally either due to atelectasis, mild nonspecific fibrosis or developing infiltrate.  Mild emphysema.  No pleural effusion or pneumothorax.  Old left-sided rib fracture deformities.  Urinalysis reveals 91 white blood cells with moderate leukocyte esterase and few bacteria.  It is somewhat contaminated with 23 squamous epithelial cells.  This could represent possible UTI versus contamination.  Urine culture was sent.    On exam patient remained hemodynamically stable without signs of respiratory distress.  She maintained normal oxygen saturations on room air.  She was ambulated with pulse ox as well and remained at 98%.  She denied any symptoms while ambulating.  Had a long discussion with the patient and her daughter via [employer]  about the results and did offer observation admission due to her age and possible pneumonia however after discussion patient and her daughter would like to return home.  Discussed that we would prescribe her levofloxacin for possible pneumonia.  This would also potentially cover UTI.  We discussed strict return precautions to the emergency department.  She was also provided with a pulse ox To measure her oxygen saturations at home and instructed on when to return.  She was also referred for care coordination and get well loop.  Despite her COVID-19 test being negative the evidence of possible pneumonia I did feel she still qualified to be further monitored as an outpatient as she wished to go home.  Patient and her daughter were in agreement with this plan.  She was advised to follow-up with her primary care provider as well.  Patient was discharged home in stable condition.    I have reviewed the nursing notes. I have reviewed the findings, diagnosis, plan and need for follow up with the patient.    Discharge Medication List as of 11/15/2021  6:52 PM      START taking these medications    Details    levofloxacin (LEVAQUIN) 750 MG tablet Take 1 tablet (750 mg) by mouth daily for 5 days, Disp-5 tablet, R-0, E-Prescribe             Final diagnoses:   Pneumonia due to infectious organism, unspecified laterality, unspecified part of lung   Acute cystitis without hematuria       --  Sydni Doll MD  [facility] EMERGENCY DEPARTMENT  11/15/2021     Sydni Doll MD  11/18/21 [id number]

## 2021-11-19 NOTE — DISCHARGE INSTRUCTIONS
Thank you for choosing [facility].     Please closely monitor for further symptoms. Return to the Emergency Department if you develop any new or worsening signs or symptoms.    If you received any opiate pain medications or sedatives during your visit, please do not drive for at least 8 hours.     Labs, cultures or final xray interpretations may still need to be reviewed.  We will call you if your plan of care needs to be changed.    Please follow up with your primary care physician or clinic.

## 2021-11-22 NOTE — PROGRESS NOTES
[facility] Care Coordination Contact  CC received notification of Emergency Room visit.  ER visit occurred on 11/18/21 at [facility] with Dx of pneumonia.    CC contacted member and reviewed discharge summary.  Member has a follow-up appointment with PCP: Yes: scheduled on 11/26/21  Member has had a change in condition: Yes: No  New referrals placed: No  Home Visit Needed: No  Care plan reviewed and updated.  PCP notified of ED visit via EMR.    Tello Ferris RN BSN PHN  [facility] Care Coordinator  [phone]  Fax:[phone]

## 2021-11-25 ENCOUNTER — PATIENT OUTREACH (OUTPATIENT)
Dept: GERIATRIC MEDICINE | Facility: CLINIC | Age: 86
End: 2021-11-25
Payer: COMMERCIAL

## 2021-11-25 NOTE — PROGRESS NOTES
[facility] Care Coordination Contact      [facility] Six-Month Telephone Assessment    6 month telephone assessment completed on 11/25/21.    ER visits: Yes -  [facility] 11/18/21 due to pneumonia.   Hospitalizations: No  TCU stays: No  Significant health status changes: States still feels ill but denies worsening symptoms since ED visit on 11/18/21. Has f/u appointment with PCP on 11/26/21.   Falls/Injuries: No  ADL/IADL changes: No  Changes in services: No    Caregiver Assessment follow up:  N/A    Goals: See POC in chart for goal progress documentation.      Will see member in 6 months for an annual health risk assessment.   Encouraged member to call CC with any questions or concerns in the meantime.     Tello Ferris RN BSN PHN  [facility] Care Coordinator  [phone]  Fax:[phone]

## 2021-11-26 ENCOUNTER — OFFICE VISIT (OUTPATIENT)
Dept: INTERNAL MEDICINE | Facility: CLINIC | Age: 86
End: 2021-11-26
Payer: COMMERCIAL

## 2021-11-26 VITALS
HEIGHT: 65 IN | BODY MASS INDEX: 23.06 KG/M2 | HEART RATE: 76 BPM | OXYGEN SATURATION: 97 % | WEIGHT: 138.4 LBS | DIASTOLIC BLOOD PRESSURE: 72 MMHG | SYSTOLIC BLOOD PRESSURE: 120 MMHG

## 2021-11-26 DIAGNOSIS — R42 VERTIGO: Primary | ICD-10-CM

## 2021-11-26 DIAGNOSIS — G44.209 TENSION HEADACHE: ICD-10-CM

## 2021-11-26 DIAGNOSIS — R10.13 ABDOMINAL PAIN, EPIGASTRIC: ICD-10-CM

## 2021-11-26 DIAGNOSIS — Z23 HIGH PRIORITY FOR 2019-NCOV VACCINE: ICD-10-CM

## 2021-11-26 DIAGNOSIS — K59.01 SLOW TRANSIT CONSTIPATION: ICD-10-CM

## 2021-11-26 DIAGNOSIS — J18.9 PNEUMONIA DUE TO INFECTIOUS ORGANISM, UNSPECIFIED LATERALITY, UNSPECIFIED PART OF LUNG: ICD-10-CM

## 2021-11-26 LAB
ANION GAP SERPL CALCULATED.3IONS-SCNC: 10 MMOL/L (ref 5–18)
BUN SERPL-MCNC: 12 MG/DL (ref 8–28)
CALCIUM SERPL-MCNC: 9.8 MG/DL (ref 8.5–10.5)
CHLORIDE BLD-SCNC: 101 MMOL/L (ref 98–107)
CO2 SERPL-SCNC: 30 MMOL/L (ref 22–31)
CREAT SERPL-MCNC: 0.8 MG/DL (ref 0.6–1.1)
GFR SERPL CREATININE-BSD FRML MDRD: 65 ML/MIN/1.73M2
GLUCOSE BLD-MCNC: 97 MG/DL (ref 70–125)
POTASSIUM BLD-SCNC: 3.9 MMOL/L (ref 3.5–5)
SODIUM SERPL-SCNC: 141 MMOL/L (ref 136–145)

## 2021-11-26 PROCEDURE — 99214 OFFICE O/P EST MOD 30 MIN: CPT | Mod: 25 | Performed by: INTERNAL MEDICINE

## 2021-11-26 PROCEDURE — 91306 COVID-19,PF,MODERNA (18+ YRS BOOSTER .25ML): CPT | Performed by: INTERNAL MEDICINE

## 2021-11-26 PROCEDURE — 80048 BASIC METABOLIC PNL TOTAL CA: CPT | Performed by: INTERNAL MEDICINE

## 2021-11-26 PROCEDURE — 36415 COLL VENOUS BLD VENIPUNCTURE: CPT | Performed by: INTERNAL MEDICINE

## 2021-11-26 PROCEDURE — 0064A COVID-19,PF,MODERNA (18+ YRS BOOSTER .25ML): CPT | Performed by: INTERNAL MEDICINE

## 2021-11-26 RX ORDER — POLYETHYLENE GLYCOL 3350 17 G/17G
17 POWDER, FOR SOLUTION ORAL DAILY
Qty: 30 PACKET | Refills: 11 | Status: SHIPPED | OUTPATIENT
Start: 2021-11-26 | End: 2023-08-16

## 2021-11-26 RX ORDER — MECLIZINE HYDROCHLORIDE 25 MG/1
25 TABLET ORAL 3 TIMES DAILY PRN
Qty: 20 TABLET | Refills: 0 | Status: SHIPPED | OUTPATIENT
Start: 2021-11-26 | End: 2023-01-09

## 2021-11-26 ASSESSMENT — MIFFLIN-ST. JEOR: SCORE: 1048.66

## 2021-11-26 NOTE — PATIENT INSTRUCTIONS
1. For constipation: start taking Miralax daily    2. For dizziness: Can try physical therapy to help reposition crystals in inner ear. Meclizine tablet at bed time to help sleep.     3. For stomach: take Omeprazole daily before dinner    4. Try lactose free milk to help with gas    5. Moderna booster today

## 2021-11-26 NOTE — PROGRESS NOTES
[facility] Follow Up Note    Assessment/Plan:    1. Vertigo  Patient has frequent recurrent episodes.  Per son she does have episodes of time when she is vertigo free.  It does interfere with her sleep.  We will refill her meclizine to use at bedtime.  I recommended physical therapy for vestibular therapy and to use walker during acute episodes.  Patient's blood pressure is not orthostatic today, will continue amlodipine at a current dose.  - meclizine (ANTIVERT) 25 MG tablet; Take 1 tablet (25 mg) by mouth 3 times daily as needed for dizziness  Dispense: 20 tablet; Refill: 0  - Physical Therapy Referral; Future  - Walker Order for DME - ONLY FOR DME    2. Tension headache  This has been chronic on and off.  No nausea, no recent falls.  Previous ESR was normal.    3. Slow transit constipation  Could be due to amlodipine.  He has slightly low potassium in the past.  We will repeat it again today, I recommended that she start some MiraLAX daily.  - polyethylene glycol (MIRALAX) 17 g packet; Take 17 g by mouth daily  Dispense: 30 packet; Refill: 11  - Basic metabolic panel  (Ca, Cl, CO2, Creat, Gluc, K, Na, BUN)    4. High priority for 2019-nCoV vaccine  - COVID-19,PF,MODERNA (18+ Yrs BOOSTER .25mL)    5.  Epigastric discomfort.  Recommend that she starts on omeprazole daily.  She has it at home.  Blood work done in the emergency room was reviewed which showed normal CBC and CMP.    6.  Mild infiltrates on chest x-ray.  Unclear if she has had atelectasis, fibrosis or infiltrate.  Regardless she completed antibiotic and currently denies any pulmonary symptoms.    Patient Instructions   1. For constipation: start taking Miralax daily    2. For dizziness: Can try physical therapy to help reposition crystals in inner ear. Meclizine tablet at bed time to help sleep.     3. For stomach: take Omeprazole daily before dinner    4. Try lactose free milk to help with gas    5. 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  She is currently here accompanied by her son who is translating.  She has not been feeling well.    She was recently seen in the emergency room twice.  On November 15 she came with cough and chest pains.  Chest x-ray showed atelectasis/possible fibrosis or early infiltrate, mild emphysema, there was a lumbar wedge compression fracture.  Flu and Covid screens and troponin were negative.  She was discharged on Levaquin for possible pneumonia.    On November 18 she came back to the emergency room with complaints of chest pains, dizziness and not feeling better on antibiotic headaches.  EKG was slightly abnormal, procalcitonin was negative.  On exam she had signs and symptoms of costochondritis.      Currently at home she still not feeling well.  She denies any shortness of breath or cough and has finished antibiotic last.  Currently she continues to have significant vertigo . it is worse when she is standing up from sitting position or lying down.  She has had those episodes in the past.  Frequently I would see her in the clinic during 1 of those episodes.  She also has mild headache but she reports that vertigo is worse.  She has been taking Tylenol 500-1000 mg as needed.  She denies any nausea or recent falls but during vertigo frequently feels unstable.  She has a cane at home but not a walker.    Blood pressure today initially 120/72, when I  "repeated sitting and standing it is the same at 115/60.    She also endorses mild epigastric pain and constipation.  She has not been taking MiraLAX or omeprazole recently.    Review of Systems:  A comprehensive review of systems was performed and was otherwise negative    PFSH:  Social History: Viewed  History   Smoking Status     Never Smoker   Smokeless Tobacco     Never Used     Social History     Social History Narrative    Patient lives by herself but does have PCA who helps with cleaning and cooking.  She does have 2 sons in the area.  She is originally from [facility].       Past History: Viewed  Current Outpatient Medications   Medication Sig Dispense Refill     acetaminophen (TYLENOL) 500 MG tablet Take 1-2 tablets by mouth every 6 hours as needed for pain and fever. 30 tablet 0     amLODIPine (NORVASC) 5 MG tablet Take 1 tablet (5 mg) by mouth daily 90 tablet 3     aspirin (ASA) 81 MG EC tablet Take 81 mg by mouth       Calcium Citrate-Vitamin D 250-200 MG-UNIT TABS Take 1 tablet by mouth       fluticasone (FLONASE) 50 MCG/ACT nasal spray        meclizine (ANTIVERT) 25 MG tablet Take 1 tablet (25 mg) by mouth 3 times daily as needed for dizziness 20 tablet 0     omeprazole (PRILOSEC) 20 MG DR capsule TAKE 1 CAPSULE BY MOUTH EVERY DAY 90 capsule 2     OMEPRAZOLE PO Take 40 mg by mouth every morning       ORDER FOR DME Equipment being ordered: Long handled shoe horn and reacher grabber 1 each 0     polyethylene glycol (MIRALAX) 17 g packet Take 17 g by mouth daily 30 packet 11     psyllium 0.52 G capsule Take 1 capsule (0.52 g) by mouth daily 540 capsule 3       Family History: Reviewed    Physical Exam:    Vitals:    11/26/21 1129   BP: 120/72   BP Location: Left arm   Patient Position: Sitting   Cuff Size: Adult Regular   Pulse: 76   SpO2: 97%   Weight: 62.8 kg (138 lb 6.4 oz)   Height: 1.651 m (5' 5\")     Wt Readings from Last 3 Encounters:   11/26/21 62.8 kg (138 lb 6.4 oz)   11/15/21 62.1 kg (137 lb) "   08/16/21 62.5 kg (137 lb 11.2 oz)     Body mass index is 23.03 kg/m .    Constitutional:  Reveals a pleasant elderly female.  Vitals:  Per nursing notes.  HEENT:No cervical LAD, no thyromegaly,  conjunctiva is pink, no scleral icterus, TMs are visualized and normal bl, oropharynx is clear, no exudates,   Cardiac:  Regular rate and rhythm,no murmurs, rubs, or gallops.  Legs without edema. Palpation of the radial pulse regular.  Lungs: Clear to auscultation bl.  Respiratory effort normal.  Abdomen:positive BS, soft,nondistended.  Epigastric tenderness noted.  No hepato-splenomagaly  Skin:   Without rash, bruise, or palpable lesions.  Rheumatologic: Normal joints and nails of the hands.  Neurologic:  Cranial nerves II-XII intact.   No nystagmus noted.  Patient feels more dizzy when lying down  Psychiatric: affect appropriate, memory intact.     Data Review:    Analysis and Summary of Old Records (2): yes      Records Requested (1): no      Other History Summarized (from other people in the room) (2): son    Radiology Tests Summarized (XRAY/CT/MRI/DXA) (1): CXR    Labs Reviewed (1): yes    Medicine Tests Reviewed (EKG/ECHO/COLONOSCOPY/EGD) (1): no    Independent Review of EKG or X-RAY (2): no

## 2021-11-28 ENCOUNTER — TELEPHONE (OUTPATIENT)
Dept: INTERNAL MEDICINE | Facility: CLINIC | Age: 86
End: 2021-11-28
Payer: COMMERCIAL

## 2021-11-28 NOTE — TELEPHONE ENCOUNTER
Please let son know results (he speaks english):  Potassium level and kidney function are normal.    See if Radha is doing better.

## 2021-12-01 ENCOUNTER — HOSPITAL ENCOUNTER (OUTPATIENT)
Dept: PHYSICAL THERAPY | Facility: CLINIC | Age: 86
Setting detail: THERAPIES SERIES
End: 2021-12-01
Attending: INTERNAL MEDICINE
Payer: COMMERCIAL

## 2021-12-01 ENCOUNTER — TELEPHONE (OUTPATIENT)
Dept: INTERNAL MEDICINE | Facility: CLINIC | Age: 86
End: 2021-12-01
Payer: COMMERCIAL

## 2021-12-01 DIAGNOSIS — R42 VERTIGO: ICD-10-CM

## 2021-12-01 DIAGNOSIS — H81.10 BENIGN PAROXYSMAL POSITIONAL VERTIGO, UNSPECIFIED LATERALITY: Primary | ICD-10-CM

## 2021-12-01 PROCEDURE — 95992 CANALITH REPOSITIONING PROC: CPT | Mod: GP | Performed by: PHYSICAL THERAPIST

## 2021-12-01 PROCEDURE — 97161 PT EVAL LOW COMPLEX 20 MIN: CPT | Mod: GP | Performed by: PHYSICAL THERAPIST

## 2021-12-01 NOTE — DISCHARGE INSTRUCTIONS
Sleep on your RIGHT side for the next 3 nights.    Then sleep how you normally do.    As you are able, try to not use the medication that your doctor gave you. You can use it if you are dizzy. Don't just take it in case you are dizzy.

## 2021-12-04 NOTE — PROGRESS NOTES
[facility]                                                                                   OUTPATIENT PHYSICAL THERAPY FUNCTIONAL EVALUATION  PLAN OF TREATMENT FOR OUTPATIENT REHABILITATION  (COMPLETE FOR INITIAL CLAIMS ONLY)  Patient's Last Name, First Name, M.I.  YOB: 1931  Radha Mcmanus     Provider's Name   [facility]   Medical Record No.  [id number]     Start of Care Date:  12/01/21   Onset Date:  11/24/21   Type:     _X__PT   ____OT  ____SLP Medical Diagnosis:  vertigo, unspecified laterality H81.10  - Primary      PT Diagnosis:  dizziness with ADLs Visits from SOC:  1                              __________________________________________________________________________________  Plan of Treatment/Functional Goals:  gait training,neuromuscular re-education (CRT)           GOALS  DHI  Pt report decreased dizziness with daily activities as evidenced by DHI <46/100.  02/28/22    25' walk  Pt demo improved stabilty and safety with household mobility as evidenced by 25' walk <7.8 secs with AAD.  02/28/22       Therapy Frequency:   (up to 6 sessions)   Predicted Duration of Therapy Intervention:  90 days    Liliam Almanzar, PT                                    I CERTIFY THE NEED FOR THESE SERVICES FURNISHED UNDER        THIS PLAN OF TREATMENT AND WHILE UNDER MY CARE     (Physician co-signature of this document indicates review and certification of the therapy plan).                Certification Date From:  12/01/21   Certification Date To:  02/28/22    Referring Provider:  Anjelica Kapadia    Initial Assessment  See Epic Evaluation- Start of Care Date: 12/01/21

## 2021-12-04 NOTE — PROGRESS NOTES
12/01/21 0900   Quick Adds   Quick Adds Vestibular Eval   Type of Visit Initial OP PT Evaluation       Present Yes   Language Paraguayan  (Alison in person, Cinthia Post Translation)   General Information   Start of Care Date 12/01/21   Referring Physician Anjelica Kapadia   Orders Evaluate and Treat as Indicated   Additional Orders 12/1/21: updated PT order with Benign paroxysmal positional vertigo, unspecified laterality H81.10  - Primary    Order Date 11/26/21   Medical Diagnosis Vertigo R42  - Primary    Onset of illness/injury or Date of Surgery 11/24/21   Precautions/Limitations no known precautions/limitations   Surgical/Medical history reviewed Yes   Pertinent history of current problem (include personal factors and/or comorbidities that impact the POC) Pt presenting with vertigo that is worse while in bed, laying on sides. Much better with sit/standing.  Same on both sides, back is better than sides. Came out of nowhere. Had something like this before - about 3 years ago. It started 1 week ago. Pt was prescribed meclizine - has been taking it before bed, sometimes during the day. Recent PMHx of pneumonia (11/15/2021) - prescribed antibiotics, feeling better now. Pt has old lumbar compression fracture - not too painful right now.   Patient role/Employment history Retired   Living environment Apartment/condo   Home/Community Accessibility Comments 1st floor, no stairs. Lives with daughter and her family.   Current Assistive Devices Four Wheeled Walker;Standard Cane   ADL Devices Shower/Tub Chair   Assistive Devices Comments does have some dizziness in the shower - spinning. Unclear if this is new or old.   Patient/Family Goals Statement Get rid of dizziness.   General Information Comments Can do BADLs, eats on own, daughter helps with meals. Exercise: walking for a few mins - walking in the apartment.   Fall Risk Screen   Fall screen completed by PT   Have you fallen 2 or more times in the  past year? No   Have you fallen and had an injury in the past year? No   Timed Up and Go score (seconds)   (NT due to time constraints)   Is patient a fall risk? Yes   Fall screen comments per clinical judgement   Abuse Screen (yes response referral indicated)   Feels Unsafe at Home or Work/School no   Feels Threatened by Someone no   Does Anyone Try to Keep You From Having Contact with Others or Doing Things Outside Your Home? no   Physical Signs of Abuse Present no   System Outcome Measures   Dizziness Handicap Inventory (score out of 100) A decrease in score by 17.18 or greater indicates a clinically significant change in symptoms. 64   Pain   Patient currently in pain No   Vitals Signs   Heart Rate 73   SpO2 100   Blood Pressure 134/76   Cognitive Status Examination   Orientation orientation to person, place and time   Level of Consciousness alert   Follows Commands and Answers Questions 100% of the time;able to follow multistep instructions   Personal Safety and Judgment intact   Memory intact   Cognitive Comment Good historian - here with family   Bed Mobility   Bed Mobility Comments Needs min A for transitions with positional testing, especially LE management.   Transfer Skills   Transfer Comments Pt able to stand with UE support on chair - uses UEs for intermittent balance as transitioning from chair to/from mat.   Gait   Gait Comments Decreased cezar, increased double stance time. Prefers to walk with holding on to family/PT, but able to walk without external support.   Gait Special Tests   Gait Special Tests 25 FOOT TIMED WALK   Gait Special Tests 25 Foot Timed Walk   Seconds 9.74   Cervicogenic Screen   Neck ROM 45* rotation B, 20* extension but guards   Oculomotor Exam   Smooth Pursuit Normal   Saccades Abnormal   Saccades Comments Pt noted increased head pressure and unsteadiness with horiz, good speed and accuracy. Increased sx with vertical.   VOR Abnormal   VOR Comments increased unsteadiness and  head pressure, guarding.   Rapid Head Thrust Comments Too guarded to complete   Infrared Goggle Exam or Frenzel Lense Exam   Vestibular Suppressant in Last 24 Hours? Yes  (meclizine last night before bed)   Exam completed with Infrared Goggles   Spontaneous Nystagmus Negative   Gaze Evoked Nystagmus Negative   Gaze Evoked Nystagmus comments eye strain with gaze L, no nystagmus noted   Head Shake Horizontal Nystagmus comments Only completed 10 head shakes before pt requested to stop - kept eyes closed, so unable to see if nystagmus   [place]-Hallpike (right) Horizontal L   [place]-Hallpike (right) comments symptommatic for spinning, 20 secs   [place]-Hallpike (Left) Negative   HSCC Supine Roll Test (Right) Horizontal L   HSCC Supine Roll Test (Right) Comments small amplitude, spinning, 40 secs   HSCC Supine Roll Test (Left) Horizontal R   HSCC Supine Roll Test (Left) Comments  smaller amplitude, increased spinning sx, 40 secs.   BPPV Canal(s) L Horizontal  (unsure if L or R - unable to tolerate confirming testing)   BPPV Type Cupulolithasis   Planned Therapy Interventions   Planned Therapy Interventions gait training;neuromuscular re-education  (CRT)   Clinical Impression   Criteria for Skilled Therapeutic Interventions Met yes, treatment indicated   PT Diagnosis dizziness with ADLs   Influenced by the following impairments vertigo with nystagmus with positional testing, reported vertigo.    Functional limitations due to impairments impaired bed mobility, off balance with basic mobility (using walker as opposed to usual cane).   Clinical Presentation Evolving/Changing   Clinical Presentation Rationale new onset about 1 week ago   Clinical Decision Making (Complexity) Low complexity   Therapy Frequency   (up to 6 sessions)   Predicted Duration of Therapy Intervention (days/wks) 90 days   Risk & Benefits of therapy have been explained Yes   Patient, Family & other staff in agreement with plan of care Yes   Goal 1   Goal Identifier  DHI   Goal Description Pt report decreased dizziness with daily activities as evidenced by DHI <46/100.   Target Date 02/28/22   Goal 2   Goal Identifier 25' walk   Goal Description Pt demo improved stabilty and safety with household mobility as evidenced by 25' walk <7.8 secs with AAD.   Target Date 02/28/22   Total Evaluation Time   PT Eval, Low Complexity Minutes (07265) 20   Therapy Certification   Certification date from 12/01/21   Certification date to 02/28/22   Medical Diagnosis vertigo, unspecified laterality H81.10  - Primary        Itzel Almanzar, SOLT, NCS  Physical Therapist     [facility] [place] [address]  [address]  [place] [address]  [email or web address]  Office: [phone]  Fax: [phone]  Gender Pronouns: she/her  Employed by [employer]

## 2021-12-15 ENCOUNTER — HOSPITAL ENCOUNTER (OUTPATIENT)
Dept: PHYSICAL THERAPY | Facility: CLINIC | Age: 86
Setting detail: THERAPIES SERIES
End: 2021-12-15
Attending: INTERNAL MEDICINE
Payer: COMMERCIAL

## 2021-12-15 PROCEDURE — 97530 THERAPEUTIC ACTIVITIES: CPT | Mod: GP,59 | Performed by: PHYSICAL THERAPIST

## 2021-12-15 PROCEDURE — 95992 CANALITH REPOSITIONING PROC: CPT | Mod: GP | Performed by: PHYSICAL THERAPIST

## 2022-01-18 ENCOUNTER — PATIENT OUTREACH (OUTPATIENT)
Dept: GERIATRIC MEDICINE | Facility: CLINIC | Age: 87
End: 2022-01-18
Payer: COMMERCIAL

## 2022-01-18 NOTE — PROGRESS NOTES
[facility] Care Coordination Contact    Received call from member reporting adult day care change. Reports she had switched to [facility] effective 01/03/22 due to closing of [facility].   POC updated and CMS notified.    Tello Ferris RN BSN PHN  [facility] Care Coordinator  [phone]  Fax:[phone]

## 2022-01-19 ENCOUNTER — PATIENT OUTREACH (OUTPATIENT)
Dept: GERIATRIC MEDICINE | Facility: CLINIC | Age: 87
End: 2022-01-19
Payer: COMMERCIAL

## 2022-01-19 NOTE — PROGRESS NOTES
[facility] Care Coordination Contact    Member Signature - POC Change:  Per CC, member has made a change to their POC.  Care Plan Change Letter mailed to member for signature with a self-addressed return envelope.   Charmaine Moncada  Case Management Specialist  [facility]  [phone]

## 2022-01-20 DIAGNOSIS — M81.0 AGE-RELATED OSTEOPOROSIS WITHOUT CURRENT PATHOLOGICAL FRACTURE: ICD-10-CM

## 2022-01-23 RX ORDER — ALENDRONATE SODIUM 70 MG/1
TABLET ORAL
Qty: 12 TABLET | Refills: 3 | Status: SHIPPED | OUTPATIENT
Start: 2022-01-23 | End: 2023-01-01

## 2022-01-23 NOTE — TELEPHONE ENCOUNTER
"Routing refill request to provider for review/approval because:  Might be discontinued     Disp Refills Start End MARIA T   alendronate (FOSAMAX) 70 MG tablet 12 tablet 2 5/17/2021  No   Sig: TAKE 1 TAB BY MOUTH EVERY 7 DAYS. TAKE WITH LARGE GLASS OR WATER & DO NOT LIE DOWN FOR 2HRS AFTER.   Sent to pharmacy as: alendronate 70 mg tablet (FOSAMAX)   E-Prescribing Status: Receipt confirmed by pharmacy (5/17/2021  5:09 PM CDT)       alendronate (FOSAMAX) 70 MG tablet [[id number]]    Electronically signed by: Anjelica Kapadia MD on 05/17/21 1709 Status: Active   Ordering user: Anjelica Kapadia MD 05/17/21 1709 Authorized by: Anjelica Kapadia MD   Frequency:  05/17/21 - Until Discontinued Released by: Anjelica Kapadia MD 05/17/21 1709   Diagnoses  Osteoporosis [M81.0]           Last office visit provider:  11/26/2021     Requested Prescriptions   Pending Prescriptions Disp Refills     alendronate (FOSAMAX) 70 MG tablet [Pharmacy Med Name: ALENDRONATE SODIUM 70 MG TAB] 12 tablet 2     Sig: TAKE 1 TABLET BY MOUTH EVERY 7 DAYS WITH LARGE GLASS OF WATER. DO NOT LIE DOWN FOR 2HRS AFTER.       Bisphosphonates Failed - 1/20/2022 12:26 AM        Failed - Medication is active on med list        Passed - Recent (12 mo) or future (30 days) visit within the authorizing provider's specialty     Patient has had an office visit with the authorizing provider or a provider within the authorizing providers department within the previous 12 mos or has a future within next 30 days. See \"Patient Info\" tab in inbasket, or \"Choose Columns\" in Meds & Orders section of the refill encounter.              Passed - Dexa on file within past 2 years     Please review last Dexa result.           Passed - Patient is age 18 or older        Passed - Normal serum creatinine on file within past 12 months     Recent Labs   Lab Test 11/26/21  1228   CR 0.80       Ok to refill medication if creatinine is low               Tosin Altamirano RN " 01/23/22 12:09 AM

## 2022-02-19 NOTE — PROGRESS NOTES
[facility]    Outpatient Physical Therapy Discharge Note  Patient: Radha Mcmanus  : 1931    Beginning/End Dates of Reporting Period:  21 to 12/15/21 (2 visits)    Referring Provider: Anjelica Fleming Diagnosis: dizziness with ADLs     Client Self Report: when in bed and getting up i am dizzy.  SAID son is here.  look up i get dizzy.  asks that son interpret.   I sleep on L side only.   took med night before last.        Outcome Measures (most recent score):  Dizziness Handicap Inventory (score out of 100) A decrease in score by 17.18 or greater indicates a clinically significant change in symptoms.: 38    Goals:  Goal Identifier DHI   Goal Description Pt report decreased dizziness with daily activities as evidenced by DHI <46/100.   Target Date 22   Date Met   12/15/21   Progress (detail required for progress note):  DHI 38/100 at last attended session.     Goal Identifier 25' walk   Goal Description Pt demo improved stabilty and safety with household mobility as evidenced by 25' walk <7.8 secs with AAD.   Target Date 22   Date Met   NOT MET   Progress (detail required for progress note):  This was not retested.     Plan:  Discharge from therapy.    Discharge:    Reason for Discharge: Patient has failed to schedule further appointments. Did keep chart open until 22 - pt did not return to clinic and no-showed her final appts.    Discharge Plan: Other services: return to clinic with new order as appropriate.

## 2022-03-08 ENCOUNTER — PATIENT OUTREACH (OUTPATIENT)
Dept: GERIATRIC MEDICINE | Facility: CLINIC | Age: 87
End: 2022-03-08
Payer: COMMERCIAL

## 2022-03-08 NOTE — PROGRESS NOTES
[facility] Care Coordination Contact    [facility] Remote Assessment     Telephone Health Risk Assessment with Radha Mcmanus completed on March 8, 2022    Type of residence:: Apartment  Current living arrangement:: I live alone     Assessment completed with:: Patient    Current Care Plan  Member currently receiving the following home care services:     Member currently receiving the following community resources: PCA, County Programs, Day Care      Medication Review  Medication reconciliation completed in Epic: Yes  Medication set-up & administration: Family/informal caregiver sets up daily.  Self-administers medications.  Medication Risk Assessment Medication (1 or more, place referral to MTM): Lacks understanding of medication regimen and Taking 1 or more high-risk medications for adults >65 years  MTM Referral Placed: No: Declined    Mental/Behavioral Health   Depression Screening:   PHQ-2 Total Score (Adult) - Positive if 3 or more points; Administer PHQ-9 if positive: 0       Mental health DX:: No        Falls Assessment:            ADL/IADL Dependencies:   Dependent ADLs:: Ambulation-cane, Bathing, Dressing, Eating, Grooming, Toileting, Transfers  Dependent IADLs:: Cleaning, Cooking, Laundry, Shopping, Meal Preparation, Medication Management, Money Management, Transportation    [facility] Plan sponsored benefits: Shared information re: Silver Sneakers/gym memberships, ASA, Calcium +D.    PCA Assessment completed at visit: Yes Annual PCA assessment indicated 27 units per day of PCA. This is the same as the previous assessment.     Elderly Waiver Eligibility: Yes-will continue on EW    Care Plan & Recommendations: Continue current POC due to no reported changes in ADLs/IADLs.    See CC for detailed assessment information.    Follow-Up Plan: Member informed of future contact, plan to f/u with member with a 6 month telephone assessment.  Contact information shared with member and family, encouraged  member to call with any questions or concerns at any time.    [place] care continuum providers: Please refer to Health Care Home on the Epic Problem List to view this patient's [facility] Care Plan Summary.    Tello Ferris RN BSN PHN  [facility] Care Coordinator  [phone]  Fax:[phone]

## 2022-03-16 ENCOUNTER — PATIENT OUTREACH (OUTPATIENT)
Dept: GERIATRIC MEDICINE | Facility: CLINIC | Age: 87
End: 2022-03-16
Payer: COMMERCIAL

## 2022-03-16 NOTE — PROGRESS NOTES
[facility] Care Coordination Contact    Received after visit chart from care coordinator.  Completed following tasks: Mailed copy of care plan to client, Updated services in access, Submitted referrals/auths for ADC w/rides and mailed POC sig sheet w/SASE.  , Provider Signature - No POC Shared:  Member indicates that they do not want their POC shared with any EW providers.   Mailed medication disposal info.    [facility]:  Emailed completed PCA assessment to [facility].  Faxed copy of PCA assessment to PCA Agency and mailed copy to member.  Faxed MD Communication to PCP.     Charmaine Moncada  Case Management Specialist  [facility]  [phone]

## 2022-03-16 NOTE — LETTER
March 16, 2022      LASHAY FABIAN  [address]   [place] [address]      Dear Lashay:    At [facility], we are dedicated to improving your health and well-being. Enclosed is the Comprehensive Care Plan that we developed with you on 3/8/2022. Please review the Care Plan carefully.    As a reminder, some of the things we discussed at your visit include:    Your physical and mental health    Ways to reduce falls    Health care needs you may have    Don t forget to contact your care coordinator if you:    Have been hospitalized or plan to be hospitalized     Have had a fall     Have experienced a change in physical health    Are experiencing emotional problems     If you do not agree with your Care Plan, have questions about it, or have experienced a change in your needs, please call me at [phone]. If you are hearing impaired, please call the [place] Relay at 165 or [phone] (fpycya-ap-cxhpfi relay service).    Sincerely,    Tello Ferirs RN, PHN    E-mail: [email or web address]  Phone: [phone]      [facility] ([facility]) is a health plan that contracts with both Medicare and the [place] Medical Assistance (Medicaid) program to provide benefits of both programs to enrollees. Enrollment in [facility] depends on contract renewal.    [id number])     [id number] (11/18)

## 2022-07-16 DIAGNOSIS — I10 ESSENTIAL (PRIMARY) HYPERTENSION: ICD-10-CM

## 2022-07-16 RX ORDER — AMLODIPINE BESYLATE 5 MG/1
TABLET ORAL
Qty: 90 TABLET | Refills: 0 | Status: SHIPPED | OUTPATIENT
Start: 2022-07-16 | End: 2022-10-11

## 2022-07-16 NOTE — TELEPHONE ENCOUNTER
"Last Written Prescription Date:  10/24/21  Last Fill Quantity: 90,  # refills: 3   Last office visit provider:  11/26/21     Requested Prescriptions   Pending Prescriptions Disp Refills     amLODIPine (NORVASC) 5 MG tablet [Pharmacy Med Name: AMLODIPINE BESYLATE 5 MG TAB] 90 tablet 3     Sig: TAKE 1 TABLET BY MOUTH EVERY DAY       Calcium Channel Blockers Protocol  Passed - 7/16/2022 12:24 AM        Passed - Blood pressure under 140/90 in past 12 months     BP Readings from Last 3 Encounters:   11/26/21 120/72   11/18/21 (!) 153/83   11/15/21 (!) 156/86                 Passed - Recent (12 mo) or future (30 days) visit within the authorizing provider's specialty     Patient has had an office visit with the authorizing provider or a provider within the authorizing providers department within the previous 12 mos or has a future within next 30 days. See \"Patient Info\" tab in inbasket, or \"Choose Columns\" in Meds & Orders section of the refill encounter.              Passed - Medication is active on med list        Passed - Patient is age 18 or older        Passed - No active pregnancy on record        Passed - Normal serum creatinine on file in past 12 months     Recent Labs   Lab Test 11/26/21  1228   CR 0.80       Ok to refill medication if creatinine is low          Passed - No positive pregnancy test in past 12 months             Ani Schultz 07/16/22 4:56 PM  "

## 2022-08-18 ENCOUNTER — PATIENT OUTREACH (OUTPATIENT)
Dept: GERIATRIC MEDICINE | Facility: CLINIC | Age: 87
End: 2022-08-18

## 2022-08-18 NOTE — PROGRESS NOTES
No member call. [facility] Regulatory Update.    Tello FerrisRN BSN PHN  [facility] Coordinator  [phone]  Fax:[phone]

## 2022-08-23 DIAGNOSIS — K21.9 GASTRO-ESOPHAGEAL REFLUX DISEASE WITHOUT ESOPHAGITIS: ICD-10-CM

## 2022-08-24 NOTE — TELEPHONE ENCOUNTER
"Last Written Prescription Date:  11/15/2021  Last Fill Quantity: 90,  # refills: 2   Last office visit provider:  11/26/2021     Requested Prescriptions   Pending Prescriptions Disp Refills     omeprazole (PRILOSEC) 20 MG DR capsule [Pharmacy Med Name: OMEPRAZOLE DR 20 MG CAPSULE] 90 capsule 2     Sig: TAKE 1 CAPSULE BY MOUTH EVERY DAY       PPI Protocol Passed - 8/24/2022  8:22 AM        Passed - Not on Clopidogrel (unless Pantoprazole ordered)        Passed - No diagnosis of osteoporosis on record        Passed - Recent (12 mo) or future (30 days) visit within the authorizing provider's specialty     Patient has had an office visit with the authorizing provider or a provider within the authorizing providers department within the previous 12 mos or has a future within next 30 days. See \"Patient Info\" tab in inbasket, or \"Choose Columns\" in Meds & Orders section of the refill encounter.              Passed - Medication is active on med list        Passed - Patient is age 18 or older        Passed - No active pregnacy on record        Passed - No positive pregnancy test in past 12 months             Marilyn Summers RN 08/24/22 8:22 AM  "

## 2022-09-08 ENCOUNTER — PATIENT OUTREACH (OUTPATIENT)
Dept: GERIATRIC MEDICINE | Facility: CLINIC | Age: 87
End: 2022-09-08

## 2022-09-08 NOTE — PROGRESS NOTES
[facility] Care Coordination Contact      [facility] Six-Month Telephone Assessment    6 month telephone assessment completed on 9/8/22.    ER visits: No  Hospitalizations: No  TCU stays: No  Significant health status changes: No  Falls/Injuries: No  ADL/IADL changes: No  Changes in services: No    Caregiver Assessment follow up:  N/A    Goals: See POC in chart for goal progress documentation.     Will see member in 6 months for an annual health risk assessment.   Encouraged member to call CC with any questions or concerns in the meantime.     Tello Ferris RN BSN PHN  [facility] Care Coordinator  [phone]  Fax:[phone]

## 2022-10-11 DIAGNOSIS — I10 ESSENTIAL (PRIMARY) HYPERTENSION: ICD-10-CM

## 2022-10-11 RX ORDER — AMLODIPINE BESYLATE 5 MG/1
5 TABLET ORAL DAILY
Qty: 90 TABLET | Refills: 0 | Status: SHIPPED | OUTPATIENT
Start: 2022-10-11 | End: 2023-01-06

## 2022-10-11 NOTE — TELEPHONE ENCOUNTER
"Last Written Prescription Date:  7/16/22  Last Fill Quantity: 90,  # refills: 0   Last office visit provider:  11/26/21     Requested Prescriptions   Pending Prescriptions Disp Refills     amLODIPine (NORVASC) 5 MG tablet [Pharmacy Med Name: AMLODIPINE BESYLATE 5 MG TAB] 90 tablet 0     Sig: TAKE 1 TABLET BY MOUTH EVERY DAY       Calcium Channel Blockers Protocol  Passed - 10/11/2022  2:58 PM        Passed - Blood pressure under 140/90 in past 12 months     BP Readings from Last 3 Encounters:   11/26/21 120/72   11/18/21 (!) 153/83   11/15/21 (!) 156/86                 Passed - Recent (12 mo) or future (30 days) visit within the authorizing provider's specialty     Patient has had an office visit with the authorizing provider or a provider within the authorizing providers department within the previous 12 mos or has a future within next 30 days. See \"Patient Info\" tab in inbasket, or \"Choose Columns\" in Meds & Orders section of the refill encounter.              Passed - Medication is active on med list        Passed - Patient is age 18 or older        Passed - No active pregnancy on record        Passed - Normal serum creatinine on file in past 12 months     Recent Labs   Lab Test 11/26/21  1228   CR 0.80       Ok to refill medication if creatinine is low          Passed - No positive pregnancy test in past 12 months             Zhang Daily RN 10/11/22 2:58 PM  "

## 2022-11-19 DIAGNOSIS — K21.9 GASTRO-ESOPHAGEAL REFLUX DISEASE WITHOUT ESOPHAGITIS: ICD-10-CM

## 2022-11-20 NOTE — TELEPHONE ENCOUNTER
"Due to be seen    Last Written Prescription Date:  8/24/22  Last Fill Quantity: 90,  # refills: 0   Last office visit provider:  11/26/21     Requested Prescriptions   Pending Prescriptions Disp Refills     omeprazole (PRILOSEC) 20 MG DR capsule [Pharmacy Med Name: OMEPRAZOLE DR 20 MG CAPSULE] 90 capsule 0     Sig: TAKE 1 CAPSULE BY MOUTH EVERY DAY       PPI Protocol Passed - 11/19/2022 12:22 AM        Passed - Not on Clopidogrel (unless Pantoprazole ordered)        Passed - No diagnosis of osteoporosis on record        Passed - Recent (12 mo) or future (30 days) visit within the authorizing provider's specialty     Patient has had an office visit with the authorizing provider or a provider within the authorizing providers department within the previous 12 mos or has a future within next 30 days. See \"Patient Info\" tab in inbasket, or \"Choose Columns\" in Meds & Orders section of the refill encounter.              Passed - Medication is active on med list        Passed - Patient is age 18 or older        Passed - No active pregnacy on record        Passed - No positive pregnancy test in past 12 months             Jacinta Ca, RN 11/20/22 5:08 PM  "

## 2022-12-31 DIAGNOSIS — M81.0 AGE-RELATED OSTEOPOROSIS WITHOUT CURRENT PATHOLOGICAL FRACTURE: ICD-10-CM

## 2023-01-01 RX ORDER — ALENDRONATE SODIUM 70 MG/1
TABLET ORAL
Qty: 12 TABLET | Refills: 0 | Status: SHIPPED | OUTPATIENT
Start: 2023-01-01 | End: 2023-03-29

## 2023-01-01 NOTE — TELEPHONE ENCOUNTER
"Routing refill request to provider for review/approval because:  Labs not current:  Creatinine  Patient needs to be seen because it has been more than 1 year since last office visit.  Early refill requested.    Last Written Prescription Date:  1/23/22  Last Fill Quantity: 12,  # refills: 3   Last office visit provider:  11/26/21 - scheduled 1/9/23    Requested Prescriptions   Pending Prescriptions Disp Refills     alendronate (FOSAMAX) 70 MG tablet [Pharmacy Med Name: ALENDRONATE SODIUM 70 MG TAB] 12 tablet 3     Sig: TAKE 1 TABLET BY MOUTH EVERY 7 DAYS WITH LARGE GLASS OF WATER. DO NOT LIE DOWN FOR 2HRS AFTER.       Bisphosphonates Failed - 1/1/2023  7:04 AM        Failed - Normal serum creatinine on file within past 12 months     Recent Labs   Lab Test 11/26/21  1228   CR 0.80       Ok to refill medication if creatinine is low          Passed - Recent (12 mo) or future (30 days) visit within the authorizing provider's specialty     Patient has had an office visit with the authorizing provider or a provider within the authorizing providers department within the previous 12 mos or has a future within next 30 days. See \"Patient Info\" tab in inbasket, or \"Choose Columns\" in Meds & Orders section of the refill encounter.              Passed - Dexa on file within past 2 years     Please review last Dexa result.           Passed - Medication is active on med list        Passed - Patient is age 18 or older             Zhang Daily RN 01/01/23 7:05 AM  "

## 2023-01-06 ENCOUNTER — PATIENT OUTREACH (OUTPATIENT)
Dept: GERIATRIC MEDICINE | Facility: CLINIC | Age: 88
End: 2023-01-06

## 2023-01-06 DIAGNOSIS — I10 ESSENTIAL (PRIMARY) HYPERTENSION: ICD-10-CM

## 2023-01-06 RX ORDER — AMLODIPINE BESYLATE 5 MG/1
TABLET ORAL
Qty: 90 TABLET | Refills: 3 | Status: SHIPPED | OUTPATIENT
Start: 2023-01-06 | End: 2024-01-22

## 2023-01-06 NOTE — TELEPHONE ENCOUNTER
"Routing refill request to provider for review/approval because:  Labs not current:  Last CR was 11/26/2021  Blood pressure under 140/90 in the past 12 months    Last Written Prescription Date:  10/11/2022  Last Fill Quantity: 90,  # refills: 1   Last office visit provider:  11/26/2021     Requested Prescriptions   Pending Prescriptions Disp Refills     amLODIPine (NORVASC) 5 MG tablet [Pharmacy Med Name: AMLODIPINE BESYLATE 5 MG TAB] 90 tablet 0     Sig: TAKE 1 TABLET BY MOUTH EVERY DAY       Calcium Channel Blockers Protocol  Failed - 1/6/2023  4:09 PM        Failed - Blood pressure under 140/90 in past 12 months     BP Readings from Last 3 Encounters:   11/26/21 120/72   11/18/21 (!) 153/83   11/15/21 (!) 156/86                 Failed - Normal serum creatinine on file in past 12 months     Recent Labs   Lab Test 11/26/21  1228   CR 0.80       Ok to refill medication if creatinine is low          Passed - Recent (12 mo) or future (30 days) visit within the authorizing provider's specialty     Patient has had an office visit with the authorizing provider or a provider within the authorizing providers department within the previous 12 mos or has a future within next 30 days. See \"Patient Info\" tab in inbasket, or \"Choose Columns\" in Meds & Orders section of the refill encounter.              Passed - Medication is active on med list        Passed - Patient is age 18 or older        Passed - No active pregnancy on record        Passed - No positive pregnancy test in past 12 months             Amy Dickey RN 01/06/23 4:10 PM  "

## 2023-01-06 NOTE — PROGRESS NOTES
Encounter opened due to Regulatory [employer] Update to close FVP Program.    Sydni Menjivar  Care Management Specialist  [facility]  [phone]

## 2023-01-06 NOTE — PROGRESS NOTES
Encounter opened due to Regulatory [employer] Update to open FVP Program.    Sydni Menjivar  Care Management Specialist  [facility]  [phone]

## 2023-01-09 ENCOUNTER — OFFICE VISIT (OUTPATIENT)
Dept: INTERNAL MEDICINE | Facility: CLINIC | Age: 88
End: 2023-01-09
Payer: COMMERCIAL

## 2023-01-09 ENCOUNTER — ANCILLARY PROCEDURE (OUTPATIENT)
Dept: GENERAL RADIOLOGY | Facility: CLINIC | Age: 88
End: 2023-01-09
Attending: INTERNAL MEDICINE
Payer: COMMERCIAL

## 2023-01-09 ENCOUNTER — TELEPHONE (OUTPATIENT)
Dept: INTERNAL MEDICINE | Facility: CLINIC | Age: 88
End: 2023-01-09

## 2023-01-09 VITALS
HEART RATE: 85 BPM | OXYGEN SATURATION: 98 % | TEMPERATURE: 98.6 F | WEIGHT: 142 LBS | SYSTOLIC BLOOD PRESSURE: 120 MMHG | DIASTOLIC BLOOD PRESSURE: 67 MMHG | HEIGHT: 65 IN | RESPIRATION RATE: 16 BRPM | BODY MASS INDEX: 23.66 KG/M2

## 2023-01-09 DIAGNOSIS — J10.1 INFLUENZA B: Primary | ICD-10-CM

## 2023-01-09 DIAGNOSIS — E55.9 VITAMIN D DEFICIENCY: ICD-10-CM

## 2023-01-09 DIAGNOSIS — R53.83 FATIGUE, UNSPECIFIED TYPE: ICD-10-CM

## 2023-01-09 DIAGNOSIS — M81.0 AGE RELATED OSTEOPOROSIS, UNSPECIFIED PATHOLOGICAL FRACTURE PRESENCE: ICD-10-CM

## 2023-01-09 DIAGNOSIS — R05.1 ACUTE COUGH: ICD-10-CM

## 2023-01-09 DIAGNOSIS — R42 VERTIGO: ICD-10-CM

## 2023-01-09 DIAGNOSIS — N64.4 BREAST PAIN, LEFT: ICD-10-CM

## 2023-01-09 DIAGNOSIS — R05.1 ACUTE COUGH: Primary | ICD-10-CM

## 2023-01-09 DIAGNOSIS — M94.0 COSTOCHONDRITIS: ICD-10-CM

## 2023-01-09 DIAGNOSIS — G44.209 TENSION HEADACHE: ICD-10-CM

## 2023-01-09 LAB
ALBUMIN SERPL BCG-MCNC: 4.2 G/DL (ref 3.5–5.2)
ALP SERPL-CCNC: 63 U/L (ref 35–104)
ALT SERPL W P-5'-P-CCNC: 15 U/L (ref 10–35)
ANION GAP SERPL CALCULATED.3IONS-SCNC: 16 MMOL/L (ref 7–15)
AST SERPL W P-5'-P-CCNC: 33 U/L (ref 10–35)
BASOPHILS # BLD AUTO: 0 10E3/UL (ref 0–0.2)
BASOPHILS NFR BLD AUTO: 0 %
BILIRUB SERPL-MCNC: 0.5 MG/DL
BUN SERPL-MCNC: 16.2 MG/DL (ref 8–23)
CALCIUM SERPL-MCNC: 9.5 MG/DL (ref 8.2–9.6)
CHLORIDE SERPL-SCNC: 98 MMOL/L (ref 98–107)
CREAT SERPL-MCNC: 0.85 MG/DL (ref 0.51–0.95)
DEPRECATED HCO3 PLAS-SCNC: 25 MMOL/L (ref 22–29)
EOSINOPHIL # BLD AUTO: 0.2 10E3/UL (ref 0–0.7)
EOSINOPHIL NFR BLD AUTO: 3 %
ERYTHROCYTE [DISTWIDTH] IN BLOOD BY AUTOMATED COUNT: 13.5 % (ref 10–15)
FLUAV AG SPEC QL IA: NEGATIVE
FLUBV AG SPEC QL IA: POSITIVE
GFR SERPL CREATININE-BSD FRML MDRD: 64 ML/MIN/1.73M2
GLUCOSE SERPL-MCNC: 132 MG/DL (ref 70–99)
HCT VFR BLD AUTO: 38.9 % (ref 35–47)
HGB BLD-MCNC: 12.9 G/DL (ref 11.7–15.7)
IMM GRANULOCYTES # BLD: 0 10E3/UL
IMM GRANULOCYTES NFR BLD: 0 %
LYMPHOCYTES # BLD AUTO: 2 10E3/UL (ref 0.8–5.3)
LYMPHOCYTES NFR BLD AUTO: 41 %
MCH RBC QN AUTO: 29.4 PG (ref 26.5–33)
MCHC RBC AUTO-ENTMCNC: 33.2 G/DL (ref 31.5–36.5)
MCV RBC AUTO: 89 FL (ref 78–100)
MONOCYTES # BLD AUTO: 0.5 10E3/UL (ref 0–1.3)
MONOCYTES NFR BLD AUTO: 10 %
NEUTROPHILS # BLD AUTO: 2.2 10E3/UL (ref 1.6–8.3)
NEUTROPHILS NFR BLD AUTO: 45 %
PLATELET # BLD AUTO: 180 10E3/UL (ref 150–450)
POTASSIUM SERPL-SCNC: 3.2 MMOL/L (ref 3.4–5.3)
PROT SERPL-MCNC: 7.8 G/DL (ref 6.4–8.3)
RBC # BLD AUTO: 4.39 10E6/UL (ref 3.8–5.2)
SODIUM SERPL-SCNC: 139 MMOL/L (ref 136–145)
T4 FREE SERPL-MCNC: 0.87 NG/DL (ref 0.9–1.7)
TSH SERPL DL<=0.005 MIU/L-ACNC: 10.1 UIU/ML (ref 0.3–4.2)
WBC # BLD AUTO: 5 10E3/UL (ref 4–11)

## 2023-01-09 PROCEDURE — 87804 INFLUENZA ASSAY W/OPTIC: CPT | Performed by: INTERNAL MEDICINE

## 2023-01-09 PROCEDURE — 82306 VITAMIN D 25 HYDROXY: CPT | Performed by: INTERNAL MEDICINE

## 2023-01-09 PROCEDURE — 84439 ASSAY OF FREE THYROXINE: CPT | Performed by: INTERNAL MEDICINE

## 2023-01-09 PROCEDURE — 36415 COLL VENOUS BLD VENIPUNCTURE: CPT | Performed by: INTERNAL MEDICINE

## 2023-01-09 PROCEDURE — 99214 OFFICE O/P EST MOD 30 MIN: CPT | Performed by: INTERNAL MEDICINE

## 2023-01-09 PROCEDURE — 71046 X-RAY EXAM CHEST 2 VIEWS: CPT | Mod: TC | Performed by: RADIOLOGY

## 2023-01-09 PROCEDURE — U0005 INFEC AGEN DETEC AMPLI PROBE: HCPCS | Performed by: INTERNAL MEDICINE

## 2023-01-09 PROCEDURE — 80050 GENERAL HEALTH PANEL: CPT | Performed by: INTERNAL MEDICINE

## 2023-01-09 PROCEDURE — U0003 INFECTIOUS AGENT DETECTION BY NUCLEIC ACID (DNA OR RNA); SEVERE ACUTE RESPIRATORY SYNDROME CORONAVIRUS 2 (SARS-COV-2) (CORONAVIRUS DISEASE [COVID-19]), AMPLIFIED PROBE TECHNIQUE, MAKING USE OF HIGH THROUGHPUT TECHNOLOGIES AS DESCRIBED BY CMS-2020-01-R: HCPCS | Performed by: INTERNAL MEDICINE

## 2023-01-09 RX ORDER — MECLIZINE HYDROCHLORIDE 25 MG/1
25 TABLET ORAL 3 TIMES DAILY PRN
Qty: 30 TABLET | Refills: 4 | Status: SHIPPED | OUTPATIENT
Start: 2023-01-09 | End: 2023-08-16

## 2023-01-09 RX ORDER — OSELTAMIVIR PHOSPHATE 75 MG/1
75 CAPSULE ORAL 2 TIMES DAILY
Qty: 10 CAPSULE | Refills: 0 | Status: SHIPPED | OUTPATIENT
Start: 2023-01-09 | End: 2023-01-14

## 2023-01-09 ASSESSMENT — ENCOUNTER SYMPTOMS
COUGH: 1
HEADACHES: 1

## 2023-01-09 NOTE — TELEPHONE ENCOUNTER
Please let pt know.  Influenza test was positive for influenza B infcetion. I'm sending prescription for antiviral medication in for her to take . She should isolate from other for 7 days to prevent transmission.    Chest XR was negative for pneumonia.    Isis

## 2023-01-09 NOTE — PATIENT INSTRUCTIONS
Once you are feeling better, get flu and covid vaccine at the pharmacy.    Due to cough We will check for Flu and covid swabs today and a chest XR.    2. Try Meclizine for vertigo.    3. Schedule repeat DEXA scan    4. Re-start Miralax for constipation .

## 2023-01-09 NOTE — PROGRESS NOTES
Assessment & Plan     Acute cough and headache:  Will evaluate for COVID and influenza.  - CBC with platelets and differential  - Influenza A & B Antigen - Clinic Collect  - Symptomatic COVID-19 Virus (Coronavirus) by PCR; Future  - Symptomatic COVID-19 Virus (Coronavirus) by PCR Nose  - XR Chest 2 Views; Future    Tension headache  She tends to get headaches on and off, she takes Tylenol.  - CBC with platelets and differential  - Comprehensive metabolic panel  - TSH with free T4 reflex    Left sternal pain  This is chronic.  On exam consistent with costochondritis.  Left breast exam was normal.  - CBC with platelets and differential    Fatigue, unspecified type  - CBC with platelets and differential  - Comprehensive metabolic panel  - TSH with free T4 reflex    Vitamin D deficiency  - Vitamin D Deficiency    Vertigo  She has chronic vertigo on and off, would like meclizine refill to help her sleep  - meclizine (ANTIVERT) 25 MG tablet; Take 1 tablet (25 mg) by mouth 3 times daily as needed for dizziness    Age related osteoporosis, unspecified pathological fracture presence  She is on Fosamax and is due for repeat bone density.  She does have quite advanced osteoporosis  - DX Hip/Pelvis/Spine; Future    Return in about 3 months (around 4/9/2023) for Routine preventive.    Anjelica Kapadia MD  [facility]    Subjective   Radha is a 89 year old accompanied by her son, presenting for the following health issues:  Derm Problem, Cough, Headache, Breast Pain, and Refill Request (Pt reports that she has had headaches that makes her feel dizzy, and left breast pain. 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  She is currently here accompanied by her son who is translating.  She has not been feeling well.    Radha has been having some headache and cough for the last 4 days on and off.  She denies any sore throat or runny nose, no chills or fever.  But she is not feeling great, she feels tired.    She has been eating okay, up to 3 meals a day.    She again mentions pain in her left ribs which per son is chronic and previous mammograms in 2018 was normal.  She denies any pleurisy or shortness of breath.    She has been having more vertigo recently.  Denies nasal congestion.  She ran out of her meclizine pills.    Cough  Associated symptoms include headaches.   Headache     History of Present Illness       Hypertension: She presents for follow up of hypertension.  She does check blood pressure  regularly outside of the clinic. Outpatient blood pressures have not been over 140/90. She follows a low salt diet.     Headaches:   Since the patient's last clinic visit, headaches are: worsened  The patient is getting headaches:  Daily  She is able to do normal daily activities when she has a migraine.  The patient is taking the following rescue/relief medications:  Tylenol   Patient states \"I get some relief\" from the rescue/relief medications.   The patient is taking the following medications to prevent migraines:  No medications to prevent migraines  In the past 4 weeks, the patient has gone to an Urgent Care or Emergency Room 0 times times due to headaches.        Pt reports that she has had headaches for about 7 days , along with breast pain, itchy skin and some coughing.      Review of Systems   Respiratory: Positive for cough.    Neurological: Positive for headaches.          Objective    /67 (BP Location: Left arm, Patient Position: Sitting, Cuff Size: Adult Regular)   Pulse 85   Temp 98.6  F (37  C) (Tympanic)   Resp 16   Ht 1.651 m (5' 5\")   Wt 64.4 kg (142 lb)   SpO2 98%   BMI 23.63 kg/m  "   Body mass index is 23.63 kg/m .  Physical Exam   General: Frail appearing female, alert and oriented x3  EYES: Eyelids, conjunctiva, and sclera were normal. Pupils were normal.   HEAD, EARS, NOSE, MOUTH, AND THROAT: no cervical LAD, no thyromegaly or nodules appreciated. TMs are visualized and normal, oropharynx is clear.  RESPIRATORY: respirations non labored, CTA bl, no wheezes, rales, no forced expiratory wheezing.  CARDIOVASCULAR: Heart rate and rhythm were normal. No murmurs, rubs,gallops. There was no peripheral edema.   GASTROINTESTINAL: Positive bowel sounds, abdomen is soft, non tender, non distended.     MUSCULOSKELETAL: Muscle mass was normal for age. No joint synovitis or deformity.  Tenderness to palpation of her left costochondral junction.    LYMPHATIC: There were no enlarged nodes palpable.  SKIN/HAIR/NAILS: Skin color was normal.  No rashes.  NEUROLOGIC: The patient was alert and oriented.  Speech was normal.  There is no facial asymmetry.   PSYCHIATRIC:  Mood and affect were normal.   Left breast exam does not show any masses, no axillary lymphadenopathy.

## 2023-01-10 ENCOUNTER — TELEPHONE (OUTPATIENT)
Dept: INTERNAL MEDICINE | Facility: CLINIC | Age: 88
End: 2023-01-10

## 2023-01-10 DIAGNOSIS — E03.9 ACQUIRED HYPOTHYROIDISM: ICD-10-CM

## 2023-01-10 DIAGNOSIS — E87.6 LOW BLOOD POTASSIUM: Primary | ICD-10-CM

## 2023-01-10 LAB
DEPRECATED CALCIDIOL+CALCIFEROL SERPL-MC: 45 UG/L (ref 20–75)
SARS-COV-2 RNA RESP QL NAA+PROBE: NEGATIVE

## 2023-01-10 RX ORDER — LEVOTHYROXINE SODIUM 25 UG/1
25 TABLET ORAL DAILY
Qty: 90 TABLET | Refills: 1 | Status: SHIPPED | OUTPATIENT
Start: 2023-01-10 | End: 2023-02-14

## 2023-01-10 RX ORDER — POTASSIUM CHLORIDE 1500 MG/1
20 TABLET, EXTENDED RELEASE ORAL DAILY
Qty: 5 TABLET | Refills: 0 | Status: SHIPPED | OUTPATIENT
Start: 2023-01-10 | End: 2023-01-15

## 2023-01-11 NOTE — TELEPHONE ENCOUNTER
Please let son know lab results:    Covid test was negative.    Kidney, liver function and red cell count are normal.    Potassium level is slightly low: I'm sending potassium pill for her to take with food once a day for 5 days. After that, if she is not eating well, can try ensure or boost nutrtional supplement with has potassium and vitamins in it OR increase potassium in diet (bananas, avocadoes, potatoes).    Thyroid level is  Abnormal. She is hypothyroid: deficient in thyroid hormone. I'm sending Thyroid supplement for her to take daily. She should take it with water first thing in the morning and separate from food, other beverages and pills by 1 hour. Repeat thyroid level lab work again in 6 weeks (order placed).    Dr GAMBOA

## 2023-01-11 NOTE — TELEPHONE ENCOUNTER
Relayed provider instruction below to patient's son.  Son verbalized understanding.  No further questions at this time.

## 2023-01-20 ENCOUNTER — TELEPHONE (OUTPATIENT)
Dept: INTERNAL MEDICINE | Facility: CLINIC | Age: 88
End: 2023-01-20
Payer: COMMERCIAL

## 2023-01-20 NOTE — TELEPHONE ENCOUNTER
1/20/2023 pt's son dropped off envelope with request for signature, date, and med list. Please fax when finished. Put into Kang's inbox.

## 2023-01-30 ENCOUNTER — PATIENT OUTREACH (OUTPATIENT)
Dept: GERIATRIC MEDICINE | Facility: CLINIC | Age: 88
End: 2023-01-30
Payer: COMMERCIAL

## 2023-01-30 NOTE — PROGRESS NOTES
[facility] Care Coordination Contact    Called adult son Ariel Raymundo to schedule annual HRA home visit. HRA has been scheduled for 2/2/23.     Tello Ferris RN BSN PHN  [facility] Care Coordinator  [phone]  Fax:[phone]

## 2023-01-30 NOTE — TELEPHONE ENCOUNTER
Called and talked with son Said (CTC on file)     Radha does not have an annual wellness in the last year. Appt scheduled for 2/13/23. Paperwork to be completed then.

## 2023-02-02 ENCOUNTER — PATIENT OUTREACH (OUTPATIENT)
Dept: GERIATRIC MEDICINE | Facility: CLINIC | Age: 88
End: 2023-02-02
Payer: COMMERCIAL

## 2023-02-02 NOTE — PROGRESS NOTES
[facility] Care Coordination Contact    [facility] Home Visit Assessment     Home visit for Health Risk Assessment with Radha Mcmanus completed on February 2, 2023    Type of residence:: Apartment  Current living arrangement:: I live alone     Assessment completed with:: Patient    Current Care Plan  Member currently receiving the following home care services:     Member currently receiving the following community resources: PCA, County Programs, Day Care      Medication Review  Medication reconciliation completed in Epic: Yes  Medication set-up & administration: Family/informal caregiver sets up daily.  Family caregiver administers medications.  Medication Risk Assessment Medication (1 or more, place referral to MTM): N/A: No risk factors identified  MTM Referral Placed: No: No risk factors idenified    Mental/Behavioral Health   Depression Screening:   PHQ-2 Total Score (Adult) - Positive if 3 or more points; Administer PHQ-9 if positive: 0       Mental health DX:: No        Falls Assessment:   Fallen 2 or more times in the past year?: No   Any fall with injury in the past year?: No    ADL/IADL Dependencies:   Dependent ADLs:: Ambulation-cane, Bathing, Dressing, Eating, Grooming, Toileting, Transfers  Dependent IADLs:: Cleaning, Cooking, Laundry, Shopping, Meal Preparation, Medication Management, Money Management, Transportation    [facility] sponsored benefits: Shared information re: Silver Sneakers/gym memberships, ASA, Calcium +D.    PCA Assessment completed at visit: Yes Annual PCA assessment indicated 22 units per day of PCA. This is the same as the previous assessment.     Elderly Waiver Eligibility: Yes-will continue on EW    Care Plan & Recommendations: Continue current services. Will increase adult day care 4 days/week per member request.     See [facility] [[email or web address]] for detailed assessment information.    Follow-Up Plan: Member informed of future contact, plan to f/u with member with a 6 month  telephone assessment.  Contact information shared with member and family, encouraged member to call with any questions or concerns at any time.    [place] care continuum providers: Please see Snapshot and Care Management Flowsheets for Specific details of care plan.    This CC note routed to PCP.    Tello Ferris RN BSN PHN  [facility] Care Coordinator  [phone]  Fax:[phone]

## 2023-02-07 ENCOUNTER — PATIENT OUTREACH (OUTPATIENT)
Dept: GERIATRIC MEDICINE | Facility: CLINIC | Age: 88
End: 2023-02-07
Payer: COMMERCIAL

## 2023-02-07 NOTE — LETTER
February 7, 2023      LASHAY FABIAN  [address]   [place] [address]      Dear Lashay:    At [facility], we re dedicated to improving your health and wellness. Enclosed is the Care Plan developed with you on 02/02/2023. Please review the Care Plan carefully.    As a reminder, during your visit we talked about:    Ways to manage your physical and mental health    Using health care to maintain and improve your health     Your preventive care needs     Remember to contact your care coordinator if you:    Are hospitalized, or plan to be hospitalized     Have a fall      Have a change in your physical or mental health    Need help finding support or services    If you have questions, or don t agree with your Care Plan, call me at [phone]. You can also call me if your needs change. TTY users, call the [place]ay at (872) or 1-174.392.1363 (orqfdr-yh-cmmbkm relay service).    Sincerely,    Tello Ferris RN, PHN    E-mail: [email or web address]  Phone: [phone]      [facility] ([facility]) is a health plan that contracts with both Medicare and the [place] (Medicaid) program to provide benefits of both programs to enrollees. Enrollment in [facility] depends on contract renewal.    [id number] accepted    [id number] (07/2022)

## 2023-02-07 NOTE — PROGRESS NOTES
[facility] Care Coordination Contact    Received after visit chart from care coordinator.  Completed following tasks: Mailed copy of care plan to client, Updated services in Database, Submitted referrals/auths for ADC and ADC transportation and Mailed Safe Medication Disposal    and Provider Signature - No POC Shared:  Member indicates that they do not want their POC shared with any EW providers.     [facility]:  Emailed completed PCA assessment to [facility].  Faxed copy of PCA assessment to PCA Agency and mailed copy to member.  Faxed MD Communication to PCP.     Sydni Menjivar  Care Management Specialist  [facility]  [phone]

## 2023-02-13 ENCOUNTER — ANCILLARY PROCEDURE (OUTPATIENT)
Dept: GENERAL RADIOLOGY | Facility: CLINIC | Age: 88
End: 2023-02-13
Attending: INTERNAL MEDICINE
Payer: COMMERCIAL

## 2023-02-13 ENCOUNTER — OFFICE VISIT (OUTPATIENT)
Dept: INTERNAL MEDICINE | Facility: CLINIC | Age: 88
End: 2023-02-13
Payer: COMMERCIAL

## 2023-02-13 VITALS
BODY MASS INDEX: 23.99 KG/M2 | WEIGHT: 144 LBS | HEART RATE: 85 BPM | DIASTOLIC BLOOD PRESSURE: 67 MMHG | TEMPERATURE: 98.9 F | SYSTOLIC BLOOD PRESSURE: 120 MMHG | HEIGHT: 65 IN

## 2023-02-13 DIAGNOSIS — M53.3 PAIN IN THE COCCYX: ICD-10-CM

## 2023-02-13 DIAGNOSIS — M54.50 ACUTE MIDLINE LOW BACK PAIN WITHOUT SCIATICA: ICD-10-CM

## 2023-02-13 DIAGNOSIS — M54.2 NECK PAIN: ICD-10-CM

## 2023-02-13 DIAGNOSIS — E03.9 HYPOTHYROIDISM, UNSPECIFIED TYPE: ICD-10-CM

## 2023-02-13 DIAGNOSIS — R42 VERTIGO: ICD-10-CM

## 2023-02-13 DIAGNOSIS — I10 PRIMARY HYPERTENSION: ICD-10-CM

## 2023-02-13 DIAGNOSIS — Z00.00 ENCOUNTER FOR MEDICARE ANNUAL WELLNESS EXAM: Primary | ICD-10-CM
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
## 2023-02-13 DIAGNOSIS — H57.9 ITCHY EYES: ICD-10-CM

## 2023-02-13 DIAGNOSIS — Z78.0 POST-MENOPAUSAL: ICD-10-CM

## 2023-02-13 DIAGNOSIS — H53.9 VISION CHANGES: ICD-10-CM

## 2023-02-13 DIAGNOSIS — E55.9 VITAMIN D DEFICIENCY: ICD-10-CM

## 2023-02-13 LAB
BASOPHILS # BLD AUTO: 0 10E3/UL (ref 0–0.2)
BASOPHILS NFR BLD AUTO: 1 %
EOSINOPHIL # BLD AUTO: 0.4 10E3/UL (ref 0–0.7)
EOSINOPHIL NFR BLD AUTO: 8 %
ERYTHROCYTE [DISTWIDTH] IN BLOOD BY AUTOMATED COUNT: 13 % (ref 10–15)
HCT VFR BLD AUTO: 37.7 % (ref 35–47)
HGB BLD-MCNC: 12.5 G/DL (ref 11.7–15.7)
IMM GRANULOCYTES # BLD: 0 10E3/UL
IMM GRANULOCYTES NFR BLD: 0 %
LYMPHOCYTES # BLD AUTO: 2 10E3/UL (ref 0.8–5.3)
LYMPHOCYTES NFR BLD AUTO: 40 %
MCH RBC QN AUTO: 29.8 PG (ref 26.5–33)
MCHC RBC AUTO-ENTMCNC: 33.2 G/DL (ref 31.5–36.5)
MCV RBC AUTO: 90 FL (ref 78–100)
MONOCYTES # BLD AUTO: 0.6 10E3/UL (ref 0–1.3)
MONOCYTES NFR BLD AUTO: 12 %
NEUTROPHILS # BLD AUTO: 2 10E3/UL (ref 1.6–8.3)
NEUTROPHILS NFR BLD AUTO: 41 %
PLATELET # BLD AUTO: 174 10E3/UL (ref 150–450)
RBC # BLD AUTO: 4.2 10E6/UL (ref 3.8–5.2)
WBC # BLD AUTO: 5 10E3/UL (ref 4–11)

## 2023-02-13 PROCEDURE — 36415 COLL VENOUS BLD VENIPUNCTURE: CPT | Performed by: INTERNAL MEDICINE

## 2023-02-13 PROCEDURE — 82306 VITAMIN D 25 HYDROXY: CPT | Performed by: INTERNAL MEDICINE

## 2023-02-13 PROCEDURE — G0439 PPPS, SUBSEQ VISIT: HCPCS | Performed by: INTERNAL MEDICINE

## 2023-02-13 PROCEDURE — 99214 OFFICE O/P EST MOD 30 MIN: CPT | Mod: 25 | Performed by: INTERNAL MEDICINE

## 2023-02-13 PROCEDURE — 72100 X-RAY EXAM L-S SPINE 2/3 VWS: CPT | Mod: TC | Performed by: RADIOLOGY

## 2023-02-13 PROCEDURE — 80061 LIPID PANEL: CPT | Performed by: INTERNAL MEDICINE

## 2023-02-13 PROCEDURE — 72220 X-RAY EXAM SACRUM TAILBONE: CPT | Mod: TC | Performed by: RADIOLOGY

## 2023-02-13 PROCEDURE — 84439 ASSAY OF FREE THYROXINE: CPT | Performed by: INTERNAL MEDICINE

## 2023-02-13 PROCEDURE — 80050 GENERAL HEALTH PANEL: CPT | Performed by: INTERNAL MEDICINE

## 2023-02-13 PROCEDURE — 72040 X-RAY EXAM NECK SPINE 2-3 VW: CPT | Mod: TC | Performed by: RADIOLOGY

## 2023-02-13 RX ORDER — OLOPATADINE HYDROCHLORIDE 2 MG/ML
1 SOLUTION/ DROPS OPHTHALMIC DAILY
Qty: 2.5 ML | Refills: 1 | Status: SHIPPED | OUTPATIENT
Start: 2023-02-13 | End: 2023-04-20

## 2023-02-13 RX ORDER — TIZANIDINE 2 MG/1
2 TABLET ORAL 3 TIMES DAILY PRN
Qty: 20 TABLET | Refills: 0 | Status: SHIPPED | OUTPATIENT
Start: 2023-02-13 | End: 2023-08-16

## 2023-02-13 ASSESSMENT — ACTIVITIES OF DAILY LIVING (ADL)
CURRENT_FUNCTION: TELEPHONE REQUIRES ASSISTANCE
CURRENT_FUNCTION: LAUNDRY REQUIRES ASSISTANCE
CURRENT_FUNCTION: BATHING REQUIRES ASSISTANCE
CURRENT_FUNCTION: PREPARING MEALS REQUIRES ASSISTANCE
CURRENT_FUNCTION: MEDICATION ADMINISTRATION REQUIRES ASSISTANCE
CURRENT_FUNCTION: TRANSPORTATION REQUIRES ASSISTANCE
CURRENT_FUNCTION: MONEY MANAGEMENT REQUIRES ASSISTANCE
CURRENT_FUNCTION: SHOPPING REQUIRES ASSISTANCE
CURRENT_FUNCTION: HOUSEWORK REQUIRES ASSISTANCE

## 2023-02-13 ASSESSMENT — ENCOUNTER SYMPTOMS
HEARTBURN: 1
FEVER: 0
FREQUENCY: 0
MYALGIAS: 1
COUGH: 0
NAUSEA: 0
EYE PAIN: 0
DIARRHEA: 0
BREAST MASS: 0
SHORTNESS OF BREATH: 0
NERVOUS/ANXIOUS: 0
HEMATOCHEZIA: 0
SORE THROAT: 0
ARTHRALGIAS: 0
CHILLS: 0
DIZZINESS: 1
ABDOMINAL PAIN: 0
WEAKNESS: 0
CONSTIPATION: 0
PALPITATIONS: 0
HEMATURIA: 0
HEADACHES: 1
DYSURIA: 0
JOINT SWELLING: 0
PARESTHESIAS: 0

## 2023-02-13 ASSESSMENT — PAIN SCALES - GENERAL: PAINLEVEL: SEVERE PAIN (7)

## 2023-02-13 NOTE — PROGRESS NOTES
"SUBJECTIVE:   Radha is a 89 year old who presents for Preventive Visit.    Patient has been advised of split billing requirements and indicates understanding: No  Are you in the first 12 months of your Medicare coverage?  No    Radha is a 89 year old female with history of hypertension, benign positional vertigo (on meclizine), insomnia, acid reflux, osteoporosis (on Fosamax since 2018), nummular dermatitis, low back pain, vitamin D deficiency, dementia, neuropathy, cataracts, history of positive QuantiFERON test.  She is currently here accompanied by her son who is translating.  She is currently here for wellness exam and also needs paperwork sent to her adult .    Unfortunately she fell on ice recently when she was trying to get in the car.  She fell on her tailbone on and the back of her head 3 days ago.  She does have chronic vertigo and currently is slightly worse.  There was no scalp laceration but she does have pain to palpation of her scalp.  She denies any vision changes.  She also has pain in her coccyx and lower back.  She has been taking Tylenol 2 tablets a day.  She has not had any falls previously.    She does have chronic vertigo for which she takes meclizine as needed.    She has not seen eye doctor recently.  Discussed that she needs to have eyes examined.  On exam she might have some findings of possible cataract.    No problems with hearing.      Healthy Habits:     In general, how would you rate your overall health?  Good    Frequency of exercise:  4-5 days/week    Duration of exercise:  15-30 minutes    Do you usually eat at least 4 servings of fruit and vegetables a day, include whole grains    & fiber and avoid regularly eating high fat or \"junk\" foods?  No    Taking medications regularly:  Yes    Medication side effects:  None    Ability to successfully perform activities of daily living:  Telephone requires assistance, transportation requires assistance, shopping requires assistance, " preparing meals requires assistance, housework requires assistance, bathing requires assistance, laundry requires assistance, medication administration requires assistance and money management requires assistance    Home Safety:  No safety concerns identified    Hearing Impairment:  No hearing concerns    In the past 6 months, have you been bothered by leaking of urine?  No    In general, how would you rate your overall mental or emotional health?  Excellent      PHQ-2 Total Score: 0    Additional concerns today:  No      Have you ever done Advance Care Planning? (For example, a Health Directive, POLST, or a discussion with a medical provider or your loved ones about your wishes): No, advance care planning information given to patient to review.  Patient declined advance care planning discussion at this time.  Cognitive Screening   1) Repeat 3 items (Leader, Season, Table)    2) Clock draw: Unable to complete  3) 3 item recall: Recalls 3 objects  Results: 3 items recalled: COGNITIVE IMPAIRMENT LESS LIKELY    Mini-CogTM Copyright S Pavithra. Licensed by the author for use in [facility]; reprinted with permission (soshea@.[email or web address]). All rights reserved.      Do you have sleep apnea, excessive snoring or daytime drowsiness?: no    Reviewed and updated as needed this visit by clinical staff   Tobacco  Allergies  Meds              Reviewed and updated as needed this visit by Provider                 Social History     Tobacco Use     Smoking status: Never     Smokeless tobacco: Never   Substance Use Topics     Alcohol use: Never         Alcohol Use 2/13/2023   Prescreen: >3 drinks/day or >7 drinks/week? No   Patient Care Team:  Anjelica Kapadia MD as PCP - General (Internal Medicine)  Tello Ferris, RN as Lead Care Coordinator (Primary Care - CC)  Anjelica Kapadia MD as Assigned PCP    The following health maintenance items are reviewed in Epic and correct as of today:  Health Maintenance   Topic Date Due  "    MEDICARE ANNUAL WELLNESS VISIT  Never done     ZOSTER IMMUNIZATION (2 of 2) 01/11/2016     DTAP/TDAP/TD IMMUNIZATION (3 - Tdap) 12/17/2017     COVID-19 Vaccine (4 - Booster for Moderna series) 01/21/2022     ANNUAL REVIEW OF HM ORDERS  08/16/2022     INFLUENZA VACCINE (1) 09/01/2022     FALL RISK ASSESSMENT  02/13/2024     ADVANCE CARE PLANNING  02/13/2028     PHQ-2 (once per calendar year)  Completed     Pneumococcal Vaccine: 65+ Years  Completed     IPV IMMUNIZATION  Aged Out     MENINGITIS IMMUNIZATION  Aged Out   Pertinent mammograms are reviewed under the imaging tab.    Review of Systems   Constitutional: Negative for chills and fever.   HENT: Negative for congestion, ear pain, hearing loss and sore throat.    Eyes: Negative for pain and visual disturbance.   Respiratory: Negative for cough and shortness of breath.    Cardiovascular: Negative for chest pain, palpitations and peripheral edema.   Gastrointestinal: Positive for heartburn. Negative for abdominal pain, constipation, diarrhea, hematochezia and nausea.   Breasts:  Positive for tenderness. Negative for breast mass and discharge.   Genitourinary: Negative for dysuria, frequency, genital sores, hematuria, pelvic pain, urgency, vaginal bleeding and vaginal discharge.   Musculoskeletal: Positive for myalgias. Negative for arthralgias and joint swelling.   Skin: Negative for rash.   Neurological: Positive for dizziness and headaches. Negative for weakness and paresthesias.   Psychiatric/Behavioral: Negative for mood changes. The patient is not nervous/anxious.    She continues to have pain in her left sternum which is chronic.  Its pain to palpation.    OBJECTIVE:   /67 (BP Location: Left arm, Patient Position: Sitting, Cuff Size: Adult Regular)   Pulse 85   Temp 98.9  F (37.2  C) (Tympanic)   Ht 1.651 m (5' 5\")   Wt 65.3 kg (144 lb)   BMI 23.96 kg/m   Estimated body mass index is 23.96 kg/m  as calculated from the following:    Height as of " "this encounter: 1.651 m (5' 5\").    Weight as of this encounter: 65.3 kg (144 lb).  Physical Exam  General: Frail appearing female, alert and oriented x3  EYES: Eyelids, conjunctiva, and sclera were normal. Pupils were normal.   HEAD, EARS, NOSE, MOUTH, AND THROAT: no cervical LAD, no thyromegaly or nodules appreciated. TMs are visualized and normal, oropharynx is clear.  She might have some clouding off her eye crystal.  RESPIRATORY: respirations non labored, CTA bl, no wheezes, rales, no forced expiratory wheezing.  CARDIOVASCULAR: Heart rate and rhythm were normal. No murmurs, rubs,gallops. There was no peripheral edema.   GASTROINTESTINAL: Positive bowel sounds, abdomen is soft, non tender, non distended.     MUSCULOSKELETAL: Muscle mass was normal for age. No joint synovitis or deformity.  She has tenderness to palpation of her left lateral skull.  Pain in her neck.  Also pain in the coccyx and across lower lumbar spine.  She is able to ambulate.  LYMPHATIC: There were no enlarged nodes palpable.  SKIN/HAIR/NAILS: Skin color was normal.  No rashes.  NEUROLOGIC: The patient was alert and oriented.  Speech was normal.  There is no facial asymmetry.   PSYCHIATRIC:  Mood and affect were normal.   Breast exam: No axilla lymphadenopathy, breast masses or skin changes appreciated.  She does have chronic costochondritis and pain to palpation of her left lateral chest wall.      ASSESSMENT / PLAN:   Radha was seen today for wellness visit, breast,  and mini-cog.    Diagnoses and all orders for this visit:    Encounter for Medicare annual wellness exam  Discussed to have eye exam done soon and have repeat bone density testing.  She refused vaccinations today.      Fall, initial encounter  Mechanical fall on ice.  Possible mild concussion, will do x-rays of her neck and back.  Discussed use of ice as needed for scalp contusion.  Can use Tylenol as needed for pain    Post-menopausal  She is on Fosamax for " osteoporosis and is due for bone density  -     DX Hip/Pelvis/Spine; Future    Vertigo  This is chronic and she usually takes meclizine at bedtime.  Symptoms are worse since her recent fall.  She is amiable to try vestibular physical therapy  -     Physical Therapy Referral; Future    Itchy eyes  -     olopatadine (PATADAY) 0.2 % ophthalmic solution; Place 0.05 mLs (1 drop) into both eyes daily  -     Adult Eye  Referral; Future    Vision changes  Suspect she has cataracts.  Referral to ophthalmology provided  -     Adult Eye  Referral; Future    Pain in the coccyx  -     XR Sacrum and Coccyx 2 Views; Future    Primary hypertension  Blood pressure is well controlled  -     CBC with platelets and differential  -     Comprehensive metabolic panel  -     Lipid panel reflex to direct LDL Fasting    Vitamin D deficiency  -     Vitamin D Deficiency    Hypothyroidism, unspecified type  Recent TSH was 10 and she started on Synthroid 2 months ago, will repeat thyroid levels  -     TSH with free T4 reflex    Neck pain  We will check x-ray due to osteoporosis and try muscle relaxant.  -     XR Cervical Spine 2/3 Views; Future  -     tiZANidine (ZANAFLEX) 2 MG tablet; Take 1 tablet (2 mg) by mouth 3 times daily as needed for muscle spasms    Acute midline low back pain without sciatica  -     XR Lumbar Spine 2/3 Views; Future          She reports that she has never smoked. She has never used smokeless tobacco.      Appropriate preventive services were discussed with this patient, including applicable screening as appropriate for cardiovascular disease, diabetes, osteopenia/osteoporosis, and glaucoma.  As appropriate for age/gender, discussed screening for colorectal cancer, prostate cancer, breast cancer, and cervical cancer. Checklist reviewing preventive services available has been given to the patient.    Reviewed patients plan of care and provided an AVS. The Basic Care Plan (routine screening as  documented in Health Maintenance) for Radha meets the Care Plan requirement. This Care Plan has been established and reviewed with the Patient.      Anjelica Kapadia MD  [facility]    Identified Health Risks:

## 2023-02-13 NOTE — PATIENT INSTRUCTIONS
Schedule bone density test    2. Try vestibular PT to help with dizziness.    3. See ophthalmology    4. Will check XR of coccyx.    5. For scalp contusion can use ice as needed to help num pain    6. For neck pain can try Tizanidine --muscle relaxant.    O'K to take it in addition to Tylenol 2 tabs     2 times a day.     7. Get covid vaccine        Patient Education   Personalized Prevention Plan  You are due for the preventive services outlined below.  Your care team is available to assist you in scheduling these services.  If you have already completed any of these items, please share that information with your care team to update in your medical record.  Health Maintenance Due   Topic Date Due    Zoster (Shingles) Vaccine (2 of 2) 01/11/2016    Diptheria Tetanus Pertussis (DTAP/TDAP/TD) Vaccine (3 - Tdap) 12/17/2017    COVID-19 Vaccine (4 - Booster for Moderna series) 01/21/2022    ANNUAL REVIEW OF HM ORDERS  08/16/2022    Flu Vaccine (1) 09/01/2022       Understanding USDA MyPlate  The USDA has guidelines to help you make healthy food choices. These are called MyPlate. MyPlate shows the food groups that make up healthy meals using the image of a place setting. Before you eat, think about the healthiest choices for what to put on your plate or in your cup or bowl. To learn more about building a healthy plate, visit www.choosemyplate.gov.    The food groups  Fruits. Any fruit or 100% fruit juice counts as part of the Fruit Group. Fruits may be fresh, canned, frozen, or dried, and may be whole, cut-up, or pureed. Make 1/2 of your plate fruits and vegetables.  Vegetables. Any vegetable or 100% vegetable juice counts as a member of the Vegetable Group. Vegetables may be fresh, frozen, canned, or dried. They can be served raw or cooked and may be whole, cut-up, or mashed. Make 1/2 of your plate fruits and vegetables.  Grains. All foods made from grains are part of the Grains Group. These include wheat, rice, oats,  cornmeal, and barley. Grains are often used to make foods such as bread, pasta, oatmeal, cereal, tortillas, and grits. Grains should be no more than 1/4 of your plate. At least half of your grains should be whole grains.  Protein. This group includes meat, poultry, seafood, beans and peas, eggs, processed soy products (such as tofu), nuts (including nut butters), and seeds. Make protein choices no more than 1/4 of your plate. Meat and poultry choices should be lean or low fat.  Dairy. The Dairy Group includes all fluid milk products and foods made from milk that contain calcium, such as yogurt and cheese. (Foods that have little calcium, such as cream, butter, and cream cheese, are not part of this group.) Most dairy choices should be low-fat or fat-free.  Oils. Oils aren't a food group, but they do contain essential nutrients. However it's important to watch your intake of oils. These are fats that are liquid at room temperature. They include canola, corn, olive, soybean, vegetable, and sunflower oil. Foods that are mainly oil include mayonnaise, certain salad dressings, and soft margarines. You likely already get your daily oil allowance from the foods you eat.  Things to limit  Eating healthy also means limiting these things in your diet:     Salt (sodium). Many processed foods have a lot of sodium. To keep sodium intake down, eat fresh vegetables, meats, poultry, and seafood when possible. Purchase low-sodium, reduced-sodium, or no-salt-added food products at the store. And don't add salt to your meals at home. Instead, season them with herbs and spices such as dill, oregano, cumin, and paprika. Or try adding flavor with lemon or lime zest and juice.  Saturated fat. Saturated fats are most often found in animal products such as beef, pork, and chicken. They are often solid at room temperature, such as butter. To reduce your saturated fat intake, choose leaner cuts of meat and poultry. And try healthier cooking  methods such as grilling, broiling, roasting, or baking. For a simple lower-fat swap, use plain nonfat yogurt instead of mayonnaise when making potato salad or macaroni salad.  Added sugars. These are sugars added to foods. They are in foods such as ice cream, candy, soda, fruit drinks, sports drinks, energy drinks, cookies, pastries, jams, and syrups. Cut down on added sugars by sharing sweet treats with a family member or friend. You can also choose fruit for dessert, and drink water or other unsweetened beverages.     [employer] last reviewed this educational content on 6/1/2020 2000-2021 [employer]. All rights reserved. This information is not intended as a substitute for professional medical care. Always follow your healthcare professional's instructions.        Activities of Daily Living    Your Health Risk Assessment indicates you have difficulties with activities of daily living such as housework, bathing, preparing meals, taking medication, etc. Please make a follow up appointment for us to address this issue in more detail.    Preventing Falls at Home  A person can fall for many reasons. Older adults may fall because reaction time slows down as we age. Your muscles and joints may get stiff, weak, or less flexible because of illness, medicines, or a physical condition.   Other health problems that make falls more likely include:   Arthritis  Dizziness or lightheadedness when you stand up (orthostatic hypotension)  History of a stroke  Dizziness  Anemia  Certain medicines taken for mental illness or to control blood pressure.  Problems with balance or gait  Bladder or urinary problems  History of falling  Changes in vision (vision impairment)  Changes in thinking skills and memory (cognitive impairment)  Injuries from a fall can include serious injuries such as broken bones, dislocated joints, internal bleeding and cuts. Injuries like these can limit your independence.   Prevention tips  To  help prevent falls and fall-related injuries, follow the tips below.    Floors  To make floors safer:   Put nonskid pads under area rugs.  Remove small rugs.  Replace worn floor coverings.  Tack carpets firmly to each step on carpeted stairs. Put nonskid strips on the edges of uncarpeted stairs.  Keep floors and stairs free of clutter and cords.  Arrange furniture so there are clear pathways.  Clean up any spills right away.  Bathrooms    To make bathrooms safer:   Install grab bars in the tub or shower.  Apply nonskid strips or put a nonskid rubber mat in the tub or shower.  Sit on a bath chair to bathe.  Use bathmats with nonskid backing.  Lighting  To improve visibility in your home:    Keep a flashlight in each room. Or put a lamp next to the bed within easy reach.  Put nightlights in the bedrooms, hallways, kitchen, and bathrooms.  Make sure all stairways have good lighting.  Take your time when going up and down stairs.  Put handrails on both sides of stairs and in walkways for more support. To prevent injury to your wrist or arm, don t use handrails to pull yourself up.  Install grab bars to pull yourself up.  Move or rearrange items that you use often. This will make them easier to find or reach.  Look at your home to find any safety hazards. Especially look at doorways, walkways, and the driveway. Remove or repair any safety problems that you find.  Other changes to make  Look around to find any safety hazards. Look closely at doorways, walkways, and the driveway. Remove or repair any safety problems that you find.  Wear shoes that fit well.  Take your time when going up and down stairs.  Put handrails on both sides of stairs and in walkways for more support. To prevent injury to your wrist or arm, don t use handrails to pull yourself up.  Install grab bars wherever needed to pull yourself up.  Arrange items that you use often. This will make them easier to find or reach.    [employer] last reviewed this  educational content on 3/1/2020    5570-5336 [employer]. All rights reserved. This information is not intended as a substitute for professional medical care. Always follow your healthcare professional's instructions.

## 2023-02-13 NOTE — PROGRESS NOTES
The patient was counseled and encouraged to consider modifying their diet and eating habits. She was provided with information on recommended healthy diet options.  The patient reports that she has difficulty with activities of daily living. I have asked that the patient make a follow up appointment in 12 weeks where this issue will be further evaluated and addressed.  She is at risk for falling and has been provided with information to reduce the risk of falling at home.

## 2023-02-14 ENCOUNTER — TELEPHONE (OUTPATIENT)
Dept: INTERNAL MEDICINE | Facility: CLINIC | Age: 88
End: 2023-02-14
Payer: COMMERCIAL

## 2023-02-14 DIAGNOSIS — E03.9 HYPOTHYROIDISM, UNSPECIFIED TYPE: Primary | ICD-10-CM

## 2023-02-14 LAB
ALBUMIN SERPL BCG-MCNC: 4.1 G/DL (ref 3.5–5.2)
ALP SERPL-CCNC: 67 U/L (ref 35–104)
ALT SERPL W P-5'-P-CCNC: 17 U/L (ref 10–35)
ANION GAP SERPL CALCULATED.3IONS-SCNC: 12 MMOL/L (ref 7–15)
AST SERPL W P-5'-P-CCNC: 28 U/L (ref 10–35)
BILIRUB SERPL-MCNC: 0.3 MG/DL
BUN SERPL-MCNC: 17.4 MG/DL (ref 8–23)
CALCIUM SERPL-MCNC: 9.9 MG/DL (ref 8.2–9.6)
CHLORIDE SERPL-SCNC: 100 MMOL/L (ref 98–107)
CHOLEST SERPL-MCNC: 166 MG/DL
CREAT SERPL-MCNC: 0.72 MG/DL (ref 0.51–0.95)
DEPRECATED CALCIDIOL+CALCIFEROL SERPL-MC: 45 UG/L (ref 20–75)
DEPRECATED HCO3 PLAS-SCNC: 28 MMOL/L (ref 22–29)
GFR SERPL CREATININE-BSD FRML MDRD: 78 ML/MIN/1.73M2
GLUCOSE SERPL-MCNC: 101 MG/DL (ref 70–99)
HDLC SERPL-MCNC: 56 MG/DL
LDLC SERPL CALC-MCNC: 89 MG/DL
NONHDLC SERPL-MCNC: 110 MG/DL
POTASSIUM SERPL-SCNC: 4 MMOL/L (ref 3.4–5.3)
PROT SERPL-MCNC: 7.7 G/DL (ref 6.4–8.3)
SODIUM SERPL-SCNC: 140 MMOL/L (ref 136–145)
T4 FREE SERPL-MCNC: 1.11 NG/DL (ref 0.9–1.7)
TRIGL SERPL-MCNC: 105 MG/DL
TSH SERPL DL<=0.005 MIU/L-ACNC: 6.22 UIU/ML (ref 0.3–4.2)

## 2023-02-14 RX ORDER — LEVOTHYROXINE SODIUM 25 UG/1
37 TABLET ORAL DAILY
Qty: 135 TABLET | Refills: 1 | Status: SHIPPED | OUTPATIENT
Start: 2023-02-14 | End: 2023-08-20

## 2023-02-14 NOTE — TELEPHONE ENCOUNTER
February 14, 2023    [facility] Forms: [facility]  received from outbox of [facility] Providers: Dr. Kapadia.  Paperwork has been reviewed and is complete.  Per initial initial request, this was sent via fax to [phone].     Ivy Camacho

## 2023-02-15 NOTE — TELEPHONE ENCOUNTER
Relayed provider message to patient's son on consent to communicate.  Son will let us know how her pain is doing and Tramadol is needed.

## 2023-02-15 NOTE — TELEPHONE ENCOUNTER
Please let pt's son know results:    Kidney function and potassium level are normal.    Thyroid level is better, but still slightly sub-optinal. I would like her to increase her Synthroid dose from 25 mcg to 37mcg a day ( take 1 and 1/2 tabs daily). I'm sending new Rx to the pharmacy.  She should have repeat thyroid lab work done in 1 month to check levels ( I put order in).    Neck XR did not show any fractures. 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  Lower back XR showed old compression fracture at L3 but no new fractures.  Coccyx XR was also negative for fracture.    She can take Tylenol for pain 650 mg 3 times a day.  If she is having pain at night, despite muscle relaxant that I prescribed (tizanidine) we can prescribe Tramadol- mild narcotic medication which will help pain but will also make her sleepy. Let me know if she needs Tramadol.    Dr GAMBOA

## 2023-03-28 DIAGNOSIS — M81.0 AGE-RELATED OSTEOPOROSIS WITHOUT CURRENT PATHOLOGICAL FRACTURE: ICD-10-CM

## 2023-03-29 RX ORDER — ALENDRONATE SODIUM 70 MG/1
TABLET ORAL
Qty: 12 TABLET | Refills: 3 | Status: SHIPPED | OUTPATIENT
Start: 2023-03-29

## 2023-03-29 NOTE — TELEPHONE ENCOUNTER
"Routing refill request to provider for review/approval because:  Failed - Dexa on file within past 2 years    Last Written Prescription Date:  1/1/2023  Last Fill Quantity: 12,  # refills: 0   Last office visit provider:  2/13/2023     Requested Prescriptions   Pending Prescriptions Disp Refills     alendronate (FOSAMAX) 70 MG tablet [Pharmacy Med Name: ALENDRONATE SODIUM 70 MG TAB] 12 tablet 0     Sig: TAKE 1 TABLET BY MOUTH EVERY 7 DAYS WITH LARGE GLASS OF WATER. DO NOT LIE DOWN FOR 2HRS AFTER.       Bisphosphonates Failed - 3/29/2023  9:37 AM        Failed - Dexa on file within past 2 years     Please review last Dexa result.           Passed - Recent (12 mo) or future (30 days) visit within the authorizing provider's specialty     Patient has had an office visit with the authorizing provider or a provider within the authorizing providers department within the previous 12 mos or has a future within next 30 days. See \"Patient Info\" tab in inbasket, or \"Choose Columns\" in Meds & Orders section of the refill encounter.              Passed - Medication is active on med list        Passed - Patient is age 18 or older        Passed - Normal serum creatinine on file within past 12 months     Recent Labs   Lab Test 02/13/23  1643   CR 0.72       Ok to refill medication if creatinine is low               Diana Lo RN 03/29/23 9:38 AM  "

## 2023-04-20 DIAGNOSIS — H57.9 ITCHY EYES: ICD-10-CM

## 2023-04-20 RX ORDER — OLOPATADINE HYDROCHLORIDE 2 MG/ML
SOLUTION/ DROPS OPHTHALMIC
Qty: 2.5 ML | Refills: 1 | Status: SHIPPED | OUTPATIENT
Start: 2023-04-20 | End: 2024-01-11

## 2023-04-21 NOTE — TELEPHONE ENCOUNTER
"  Last Written Prescription Date:  2/13/23  Last Fill Quantity: 2.5 mL,  # refills: 1   Last office visit provider:   2/13/23    Requested Prescriptions   Pending Prescriptions Disp Refills     olopatadine (PATADAY) 0.2 % ophthalmic solution [Pharmacy Med Name: OLOPATADINE HCL 0.2% EYE DROP] 2.5 mL 1     Sig: PLACE 1 DROP INTO BOTH EYES DAILY       Miscellaneous Opthalmic Allergy Drops Protocol Passed - 4/20/2023  9:41 AM        Passed - Patient is age 4 or older        Passed - Recent (12 mo) or future (30 days) visit within the authorizing provider's specialty     Patient has had an office visit with the authorizing provider or a provider within the authorizing providers department within the previous 12 mos or has a future within next 30 days. See \"Patient Info\" tab in inbasket, or \"Choose Columns\" in Meds & Orders section of the refill encounter.              Passed - Medication is active on med list        Passed - Patient is not pregnant        Passed - No positive pregnancy test on record in past 12 mos             Sydni Quiles RN 04/20/23 9:00 PM  "

## 2023-08-15 ENCOUNTER — TELEPHONE (OUTPATIENT)
Dept: INTERNAL MEDICINE | Facility: CLINIC | Age: 88
End: 2023-08-15
Payer: COMMERCIAL

## 2023-08-16 ENCOUNTER — OFFICE VISIT (OUTPATIENT)
Dept: INTERNAL MEDICINE | Facility: CLINIC | Age: 88
End: 2023-08-16
Payer: COMMERCIAL

## 2023-08-16 VITALS
TEMPERATURE: 97.8 F | DIASTOLIC BLOOD PRESSURE: 64 MMHG | RESPIRATION RATE: 20 BRPM | OXYGEN SATURATION: 99 % | SYSTOLIC BLOOD PRESSURE: 130 MMHG | HEART RATE: 79 BPM | HEIGHT: 65 IN | WEIGHT: 141.2 LBS | BODY MASS INDEX: 23.53 KG/M2

## 2023-08-16 DIAGNOSIS — M94.0 COSTOCHONDRITIS: ICD-10-CM

## 2023-08-16 DIAGNOSIS — R35.0 URINARY FREQUENCY: ICD-10-CM

## 2023-08-16 DIAGNOSIS — G44.209 TENSION HEADACHE: ICD-10-CM

## 2023-08-16 DIAGNOSIS — E03.9 ACQUIRED HYPOTHYROIDISM: ICD-10-CM

## 2023-08-16 DIAGNOSIS — R42 VERTIGO: Primary | ICD-10-CM

## 2023-08-16 DIAGNOSIS — R10.12 LEFT UPPER QUADRANT PAIN: ICD-10-CM

## 2023-08-16 DIAGNOSIS — K59.09 OTHER CONSTIPATION: ICD-10-CM

## 2023-08-16 DIAGNOSIS — M54.2 NECK PAIN: ICD-10-CM

## 2023-08-16 LAB
ALBUMIN UR-MCNC: NEGATIVE MG/DL
ANION GAP SERPL CALCULATED.3IONS-SCNC: 11 MMOL/L (ref 7–15)
APPEARANCE UR: CLEAR
BACTERIA #/AREA URNS HPF: NORMAL /HPF
BASOPHILS # BLD AUTO: 0 10E3/UL (ref 0–0.2)
BASOPHILS NFR BLD AUTO: 1 %
BILIRUB UR QL STRIP: NEGATIVE
BUN SERPL-MCNC: 11.2 MG/DL (ref 8–23)
CALCIUM SERPL-MCNC: 9.2 MG/DL (ref 8.2–9.6)
CHLORIDE SERPL-SCNC: 100 MMOL/L (ref 98–107)
COLOR UR AUTO: YELLOW
CREAT SERPL-MCNC: 0.69 MG/DL (ref 0.51–0.95)
CRP SERPL-MCNC: <3 MG/L
DEPRECATED HCO3 PLAS-SCNC: 27 MMOL/L (ref 22–29)
EOSINOPHIL # BLD AUTO: 0.3 10E3/UL (ref 0–0.7)
EOSINOPHIL NFR BLD AUTO: 7 %
ERYTHROCYTE [DISTWIDTH] IN BLOOD BY AUTOMATED COUNT: 13.6 % (ref 10–15)
ERYTHROCYTE [SEDIMENTATION RATE] IN BLOOD BY WESTERGREN METHOD: 17 MM/HR (ref 0–30)
GFR SERPL CREATININE-BSD FRML MDRD: 81 ML/MIN/1.73M2
GLUCOSE SERPL-MCNC: 95 MG/DL (ref 70–99)
GLUCOSE UR STRIP-MCNC: NEGATIVE MG/DL
HCT VFR BLD AUTO: 38.9 % (ref 35–47)
HGB BLD-MCNC: 12.7 G/DL (ref 11.7–15.7)
HGB UR QL STRIP: NEGATIVE
IMM GRANULOCYTES # BLD: 0 10E3/UL
IMM GRANULOCYTES NFR BLD: 0 %
KETONES UR STRIP-MCNC: NEGATIVE MG/DL
LEUKOCYTE ESTERASE UR QL STRIP: NEGATIVE
LYMPHOCYTES # BLD AUTO: 2 10E3/UL (ref 0.8–5.3)
LYMPHOCYTES NFR BLD AUTO: 50 %
MCH RBC QN AUTO: 29.1 PG (ref 26.5–33)
MCHC RBC AUTO-ENTMCNC: 32.6 G/DL (ref 31.5–36.5)
MCV RBC AUTO: 89 FL (ref 78–100)
MONOCYTES # BLD AUTO: 0.3 10E3/UL (ref 0–1.3)
MONOCYTES NFR BLD AUTO: 8 %
NEUTROPHILS # BLD AUTO: 1.4 10E3/UL (ref 1.6–8.3)
NEUTROPHILS NFR BLD AUTO: 35 %
NITRATE UR QL: NEGATIVE
PH UR STRIP: 6 [PH] (ref 5–8)
PLATELET # BLD AUTO: 156 10E3/UL (ref 150–450)
POTASSIUM SERPL-SCNC: 3.5 MMOL/L (ref 3.4–5.3)
RBC # BLD AUTO: 4.37 10E6/UL (ref 3.8–5.2)
RBC #/AREA URNS AUTO: NORMAL /HPF
SODIUM SERPL-SCNC: 138 MMOL/L (ref 136–145)
SP GR UR STRIP: <=1.005 (ref 1–1.03)
T4 FREE SERPL-MCNC: 1.17 NG/DL (ref 0.9–1.7)
TSH SERPL DL<=0.005 MIU/L-ACNC: 4.55 UIU/ML (ref 0.3–4.2)
UROBILINOGEN UR STRIP-ACNC: 0.2 E.U./DL
WBC # BLD AUTO: 4.1 10E3/UL (ref 4–11)
WBC #/AREA URNS AUTO: NORMAL /HPF

## 2023-08-16 PROCEDURE — 99214 OFFICE O/P EST MOD 30 MIN: CPT | Performed by: INTERNAL MEDICINE

## 2023-08-16 PROCEDURE — 85025 COMPLETE CBC W/AUTO DIFF WBC: CPT | Performed by: INTERNAL MEDICINE

## 2023-08-16 PROCEDURE — 36415 COLL VENOUS BLD VENIPUNCTURE: CPT | Performed by: INTERNAL MEDICINE

## 2023-08-16 PROCEDURE — 85652 RBC SED RATE AUTOMATED: CPT | Performed by: INTERNAL MEDICINE

## 2023-08-16 PROCEDURE — 84439 ASSAY OF FREE THYROXINE: CPT | Performed by: INTERNAL MEDICINE

## 2023-08-16 PROCEDURE — 86140 C-REACTIVE PROTEIN: CPT | Performed by: INTERNAL MEDICINE

## 2023-08-16 PROCEDURE — 84443 ASSAY THYROID STIM HORMONE: CPT | Performed by: INTERNAL MEDICINE

## 2023-08-16 PROCEDURE — 80048 BASIC METABOLIC PNL TOTAL CA: CPT | Performed by: INTERNAL MEDICINE

## 2023-08-16 PROCEDURE — 81001 URINALYSIS AUTO W/SCOPE: CPT | Performed by: INTERNAL MEDICINE

## 2023-08-16 RX ORDER — TIZANIDINE 2 MG/1
2 TABLET ORAL 3 TIMES DAILY PRN
Qty: 20 TABLET | Refills: 0 | Status: SHIPPED | OUTPATIENT
Start: 2023-08-16 | End: 2024-09-16

## 2023-08-16 RX ORDER — POLYETHYLENE GLYCOL 3350 17 G/17G
17 POWDER, FOR SOLUTION ORAL DAILY
Qty: 30 PACKET | Refills: 11 | Status: SHIPPED | OUTPATIENT
Start: 2023-08-16

## 2023-08-16 RX ORDER — MECLIZINE HYDROCHLORIDE 25 MG/1
25 TABLET ORAL
Qty: 30 TABLET | Refills: 4 | Status: SHIPPED | OUTPATIENT
Start: 2023-08-16 | End: 2024-01-11

## 2023-08-16 ASSESSMENT — ENCOUNTER SYMPTOMS: HEADACHES: 1

## 2023-08-16 NOTE — PATIENT INSTRUCTIONS
Start Meclizine at bed time.    2, restart Miralax for constipation.    3. Will check labs and urine test today. If O'K, I may order prednisone to help with headache and chest tenderness (costochondritis).     4. Urine test today.

## 2023-08-16 NOTE — TELEPHONE ENCOUNTER
Pt I scheduled to see me for headache, not feeling well, dizziness. Please check with family and triage for covid Sx to see if she needs precautions when coming for her visit.

## 2023-08-16 NOTE — TELEPHONE ENCOUNTER
"Spoke with patient's son Said (CTC on file) to gather more information. Said states there is no concern for Covid and has not been exposed to Covid. States for the last 2 weeks the patient has been experiencing an increase in fatigue, dizziness and headaches. Denies any falls or injury. States she has also been constipated for the last 2-3 days. States there have been no recent changes in medication or diet and she has been drinking water. States BP has been \"in the lower 140s.\"  "

## 2023-08-16 NOTE — PROGRESS NOTES
Assessment & Plan     Vertigo  She does have periodic episodes of vertigo.  Currently symptoms only with lying down and when when she is walking around.  She ran out of meclizine.  She will start taking it as needed at bedtime.  No signs of symptoms of ear infection, fluid behind tympanic membranes or sinusitis.  - meclizine (ANTIVERT) 25 MG tablet; Take 1 tablet (25 mg) by mouth nightly as needed for dizziness    Tension headache  This also happens on and off, because she has some pain in the left temporal area, will check inflammatory markers.  Currently she denies any vision change or toothache or jaw pain.  - TSH with free T4 reflex  - Basic metabolic panel  (Ca, Cl, CO2, Creat, Gluc, K, Na, BUN)  - CBC with platelets and differential  - ESR: Erythrocyte sedimentation rate  - CRP, inflammation    Other constipation  She ran out of MiraLAX and currently is constipated, suspect that left upper quadrant abdominal pain is due to that, it has happened in the past.  We will reinstitute MiraLAX  - polyethylene glycol (MIRALAX) 17 g packet; Take 17 g by mouth daily  - TSH with free T4 reflex  - Basic metabolic panel  (Ca, Cl, CO2, Creat, Gluc, K, Na, BUN)  - CBC with platelets and differential    Neck pain  - tiZANidine (ZANAFLEX) 2 MG tablet; Take 1 tablet (2 mg) by mouth 3 times daily as needed for muscle spasms    Urinary frequency  Check urine and sugar today  - UA with Microscopic reflex to Culture - lab collect    Acquired hypothyroidism  Is on Synthroid, instructions say 1-1/2 tablets a day but she has only been taking 1 tablet, will repeat TSH    Costochondritis  She has recurrent pain in her left breast, left sternum and neck.  On exam today she does have costochondritis, breast exam today is negative for lumps.  She will continue Tylenol.  If sugar is normal, we could give her a course of Medrol Dosepak.    Left upper quadrant pain  Suspect due to constipation but will check lipase and liver function  tests.  - Lipase; Future  - Hepatic panel (Albumin, ALT, AST, Bili, Alk Phos, TP); Future    Anjelica Kapadia MD  [facility]    Subjective   Radha is a 89 year old, presenting for the following health issues:  Dizziness (X 2 weeks), Headache (X 2 weeks), and Constipation (X 2 weeks)      8/16/2023    12:08 PM   Additional Questions   Roomed by Joann   Accompanied by Son Said         8/16/2023    12:08 PM   Patient Reported Additional Medications   Patient reports taking the following new medications N/A     Radha is a 89 year old female with history of hypertension, benign positional vertigo (on meclizine), insomnia, acid reflux, osteoporosis (on Fosamax since 2018), nummular dermatitis, low back pain, vitamin D deficiency, dementia, neuropathy, cataracts, history of positive QuantiFERON test.  She is currently here accompanied by her son who is translating.     Symptoms started 3 days ago, she developed vertigo, frontal headache which is worse when she is lying down and some constipation for the last 3 days.    Those episodes do happen periodically.  In the past she was on meclizine but currently has ran out.  Dizziness is vertiginous and worse when she is lying down.  Her headache is across her forehead and in the left temporal area, she denies any nasal congestion but has occasional postnasal drainage.  She has been taking Tylenol twice a day.  In the past similar episodes do resolve when she has periods of time where she is completely asymptomatic.  Currently she denies any ear pain or tooth pain or jaw pain.    She does complain about left chest pain.  It sent tender to palpation in costochondritis area.  This is chronic as well.    Previously Synthroid was prescribed and instructions say 1-1/2 tablet a day but she is currently only taking 1 tablet a day.    She ran out of MiraLAX and has been constipated for 3 days and is having mild left upper quadrant discomfort.    Urination has  "been more frequent as well.    Headache     History of Present Illness       Hypertension: She presents for follow up of hypertension.  She does check blood pressure  regularly outside of the clinic. Outside blood pressures have been over 140/90. She follows a low salt diet.     She eats 2-3 servings of fruits and vegetables daily.She consumes 0 sweetened beverage(s) daily.She exercises with enough effort to increase her heart rate 20 to 29 minutes per day.  She exercises with enough effort to increase her heart rate 3 or less days per week.   She is taking medications regularly.       Review of Systems   Neurological:  Positive for headaches.          Objective    /64 (BP Location: Left arm, Patient Position: Sitting, Cuff Size: Adult Regular)   Pulse 79   Temp 97.8  F (36.6  C) (Tympanic)   Resp 20   Ht 1.651 m (5' 5\")   Wt 64 kg (141 lb 3.2 oz)   LMP  (LMP Unknown)   SpO2 99%   Breastfeeding No   BMI 23.50 kg/m    Body mass index is 23.5 kg/m .  Physical Exam   General: Frail appearing female, alert and oriented x3  EYES: Eyelids, conjunctiva, and sclera were normal. Pupils were normal.   HEAD, EARS, NOSE, MOUTH, AND THROAT: no cervical LAD, no thyromegaly or nodules appreciated. TMs are visualized and normal, oropharynx is clear.  Mild tenderness to palpation of the left temporal area.  RESPIRATORY: respirations non labored, CTA bl, no wheezes, rales, no forced expiratory wheezing.  CARDIOVASCULAR: Heart rate and rhythm were normal. No murmurs, rubs,gallops. There was no peripheral edema.  There is to palpation of her sternum.  GASTROINTESTINAL: Positive bowel sounds, abdomen is soft, mild left upper quadrant discomfort without rebound, non distended.     MUSCULOSKELETAL: Muscle mass was normal for age. No joint synovitis or deformity.  LYMPHATIC: There were no enlarged nodes palpable.  SKIN/HAIR/NAILS: Skin color was normal.  No rashes.  NEUROLOGIC: The patient was alert and oriented.  Speech was " normal.  There is no facial asymmetry.   PSYCHIATRIC:  Mood and affect were normal.   Left breast exam: No axilla lymphadenopathy or breast masses noted.      Answers submitted by the patient for this visit:  Hypertension Visit (Submitted on 8/16/2023)  Chief Complaint: Chronic problems general questions HPI Form  Do you check your blood pressure regularly outside of the clinic?: Yes  Are your blood pressures ever more than 140 on the top number (systolic) OR more than 90 on the bottom number (diastolic)? (For example, greater than 140/90): Yes  Are you following a low salt diet?: Yes  General Questionnaire (Submitted on 8/16/2023)  Chief Complaint: Chronic problems general questions HPI Form  How many servings of fruits and vegetables do you eat daily?: 2-3  On average, how many sweetened beverages do you drink each day (Examples: soda, juice, sweet tea, etc.  Do NOT count diet or artificially sweetened beverages)?: 0  How many minutes a day do you exercise enough to make your heart beat faster?: 20 to 29  How many days a week do you exercise enough to make your heart beat faster?: 3 or less  How many days per week do you miss taking your medication?: 0

## 2023-08-20 ENCOUNTER — TELEPHONE (OUTPATIENT)
Dept: INTERNAL MEDICINE | Facility: CLINIC | Age: 88
End: 2023-08-20
Payer: COMMERCIAL

## 2023-08-20 DIAGNOSIS — R10.12 LEFT UPPER QUADRANT PAIN: Primary | ICD-10-CM

## 2023-08-20 DIAGNOSIS — E03.9 HYPOTHYROIDISM, UNSPECIFIED TYPE: ICD-10-CM

## 2023-08-20 RX ORDER — LEVOTHYROXINE SODIUM 25 UG/1
TABLET ORAL
Qty: 90 TABLET | Refills: 1
Start: 2023-08-20 | End: 2024-09-16

## 2023-08-20 NOTE — TELEPHONE ENCOUNTER
Please let daughter know lab results:    Urine test is negative for infection.    Inflammatory markers are not elevated: no inflammation.    Kidney function, red cell count and sugar are normal.    Thyroid level is better, O'K to continue on 1 Synthroid tab a day, but make sure she is  it from food, coffee and other meds/supplements by 1 hour.

## 2023-09-07 ENCOUNTER — PATIENT OUTREACH (OUTPATIENT)
Dept: GERIATRIC MEDICINE | Facility: CLINIC | Age: 88
End: 2023-09-07
Payer: COMMERCIAL

## 2023-09-07 NOTE — PROGRESS NOTES
[facility] Care Coordination Contact      [facility] Six-Month Telephone Assessment    6 month telephone assessment completed on 9/7/23.    ER visits: No  Hospitalizations: No  TCU stays: No  Significant health status changes: No  Falls/Injuries: No  ADL/IADL changes: No  Changes in services: No    Caregiver Assessment follow up:  Has paid caregiver only.     Goals: See POC in chart for goal progress documentation.      Will see member in 6 months for an annual health risk assessment.   Encouraged member to call CC with any questions or concerns in the meantime.     Tello Ferris RN BSN PHN  [facility] Care Coordinator  [phone]  Fax:[phone]

## 2023-10-25 ENCOUNTER — OFFICE VISIT (OUTPATIENT)
Dept: OPHTHALMOLOGY | Facility: CLINIC | Age: 88
End: 2023-10-25
Attending: OPHTHALMOLOGY
Payer: COMMERCIAL

## 2023-10-25 DIAGNOSIS — H52.203 MYOPIA OF BOTH EYES WITH ASTIGMATISM AND PRESBYOPIA: ICD-10-CM

## 2023-10-25 DIAGNOSIS — H11.153 PINGUECULA OF BOTH EYES: ICD-10-CM

## 2023-10-25 DIAGNOSIS — H25.813 COMBINED FORMS OF AGE-RELATED CATARACT OF BOTH EYES: Primary | ICD-10-CM

## 2023-10-25 DIAGNOSIS — H18.513 FUCHS' CORNEAL DYSTROPHY OF BOTH EYES: ICD-10-CM

## 2023-10-25 DIAGNOSIS — H52.13 MYOPIA OF BOTH EYES WITH ASTIGMATISM AND PRESBYOPIA: ICD-10-CM

## 2023-10-25 DIAGNOSIS — H52.4 MYOPIA OF BOTH EYES WITH ASTIGMATISM AND PRESBYOPIA: ICD-10-CM

## 2023-10-25 PROCEDURE — 92004 COMPRE OPH EXAM NEW PT 1/>: CPT | Performed by: OPHTHALMOLOGY

## 2023-10-25 PROCEDURE — G0463 HOSPITAL OUTPT CLINIC VISIT: HCPCS | Performed by: OPHTHALMOLOGY

## 2023-10-25 PROCEDURE — 76516 ECHO EXAM OF EYE: CPT | Performed by: OPHTHALMOLOGY

## 2023-10-25 ASSESSMENT — REFRACTION_MANIFEST
OS_AXIS: 166
OD_SPHERE: -8.00
METHOD_AUTOREFRACTION: 1
OS_AXIS: 166
OS_SPHERE: -6.25
OS_CYLINDER: +6.75
OD_SPHERE: -8.00
OD_AXIS: 173
OS_CYLINDER: +6.75
OD_CYLINDER: +7.00
OD_AXIS: 176
OS_SPHERE: -6.00
OD_CYLINDER: +7.00

## 2023-10-25 ASSESSMENT — VISUAL ACUITY
METHOD: SNELLEN - LINEAR
OD_SC+: -1
OD_SC: 20/125
OS_SC: 20/200

## 2023-10-25 ASSESSMENT — TONOMETRY
OD_IOP_MMHG: 8
IOP_METHOD: ICARE
OS_IOP_MMHG: 8

## 2023-10-25 ASSESSMENT — CUP TO DISC RATIO
OS_RATIO: 0.7
OD_RATIO: 0.7

## 2023-10-25 ASSESSMENT — SLIT LAMP EXAM - LIDS
COMMENTS: NORMAL
COMMENTS: NORMAL

## 2023-10-25 NOTE — NURSING NOTE
Chief Complaints and History of Present Illnesses   Patient presents with    COMPREHENSIVE EYE EXAM     Chief Complaint(s) and History of Present Illness(es)       COMPREHENSIVE EYE EXAM              Laterality: both eyes    Course: gradually worsening    Associated symptoms: headache.  Negative for eye pain, flashes and floaters    Treatments tried: eye drops              Comments    Radha Mcmanus is a(n) 89 year old female who presents for a comprehensive exam. Last eye exam was a few year(s) ago. Since exam, vision has worsened. Uses allergy drops. No flashes and floaters. and eye pain.     Lab Results       Component                Value               Date                       A1C                      5.4                 01/23/2019                 A1C                      5.3                 11/16/2015              Angel Sanz COT 2:33 PM October 25, 2023    History and physical examination completed with assistance of son as

## 2023-10-30 NOTE — PROGRESS NOTES
Chart reviewed. Pt has past medical history adverse effects of anesthesia, arrhythmia, arthritis, asthma, autoimmune disease, CFS, Contact dertmatitis and other eczema, GERD, hiatal hernia, rotator cuff disorder. Pt is post-op day 3 of a Laparoscopic paraesophageal hernia repair with mesh and Laparoscopic cholecystectomy by Dr. David Dawn. CM met with pt to introduce role of CM and discuss discharge needs. Pt lives with his friend Anna Ambrose ([phone]) in a private residence in [place]. Pt is independent with ADLs and uses no DME. Emergency contacts are friend Anna Ambrose and supportive daughter [employer] ([phone]). Confirmed insurance is Medicare and [employer]. Pt gets medications at [facility]. PCP is Dr. Valerie Smart who pt stated he last saw 2 weeks ago. Family will transport pt home via car at discharge. Pt requested to speak with Patient Advocacy. CM called and spoke with Ely with Patient Advocacy who will visit pt today. CM will follow and be available if needs arise. Care Management Interventions  PCP Verified by CM: Yes Vishnu Huber MD)  Mode of Transport at Discharge:  Other (see comment) (family)  Transition of Care Consult (CM Consult): Discharge Planning  MyChart Signup: No  Discharge Durable Medical Equipment: No  Physical Therapy Consult: No  Occupational Therapy Consult: No  Speech Therapy Consult: No  Current Support Network: Own Home, Other (Lives with roomate)  Confirm Follow Up Transport: Family  Plan discussed with Pt/Family/Caregiver: Yes  Discharge Location  Discharge Placement: Home     SHANITA Miranda/DALTON HPI       COMPREHENSIVE EYE EXAM    In both eyes.  Since onset it is gradually worsening.  Associated symptoms include headache.  Negative for eye pain, flashes and floaters.  Treatments tried include eye drops.             Comments    Radha Mcmanus is a(n) 89 year old female who presents for a comprehensive exam. Last eye exam was a few year(s) ago. Since exam, vision has worsened. Uses allergy drops. No flashes and floaters. and eye pain.     Lab Results       Component                Value               Date                       A1C                      5.4                 01/23/2019                 A1C                      5.3                 11/16/2015              Angel Sanz COT 2:33 PM October 25, 2023    History and physical examination completed with assistance of son as                  Last edited by Angel Sanz on 10/25/2023  2:40 PM.         Review of systems for the eyes was negative other than the pertinent positives/negatives listed in the HPI.      Assessment & Plan    HPI:  Radha Mcmanus is a 89 year old female who presents for comprehensive exam. She is not wearing glasses. She notes that her vision is worse since she was last seen.       POHx: denies  PMHx: HTN, GERD, osteoporosis  Current Medications: acetaminophen (TYLENOL) 500 MG tablet, Take 1-2 tablets by mouth every 6 hours as needed for pain and fever.  alendronate (FOSAMAX) 70 MG tablet, TAKE 1 TABLET BY MOUTH EVERY 7 DAYS WITH LARGE GLASS OF WATER. DO NOT LIE DOWN FOR 2HRS AFTER.  amLODIPine (NORVASC) 5 MG tablet, TAKE 1 TABLET BY MOUTH EVERY DAY  aspirin (ASA) 81 MG EC tablet, Take 81 mg by mouth  Calcium Citrate-Vitamin D 250-200 MG-UNIT TABS, Take 1 tablet by mouth  fluticasone (FLONASE) 50 MCG/ACT nasal spray,   levothyroxine (SYNTHROID/LEVOTHROID) 25 MCG tablet, One tab by mouth daily with a glass of water. Separate from food, coffee, medications and supplements by 2 hours.  meclizine (ANTIVERT) 25 MG tablet, Take 1 tablet (25  mg) by mouth nightly as needed for dizziness  olopatadine (PATADAY) 0.2 % ophthalmic solution, PLACE 1 DROP INTO BOTH EYES DAILY  omeprazole (PRILOSEC) 20 MG DR capsule, TAKE 1 CAPSULE BY MOUTH EVERY DAY  ORDER FOR DME, Equipment being ordered: Long handled shoe horn and reacher grabber  polyethylene glycol (MIRALAX) 17 g packet, Take 17 g by mouth daily  tiZANidine (ZANAFLEX) 2 MG tablet, Take 1 tablet (2 mg) by mouth 3 times daily as needed for muscle spasms    No current facility-administered medications on file prior to visit.    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) Fuchs' corneal dystrophy of both eyes  Brunescent cataract both eyes with corneal edema and endothelial loss  Specular microscopy (only attainable right eye due to tech experience) demonstrated cell count 1145, which is the less symptomatic eye from a cornea perspective with thinner pachymetry and less descemet's folds  Cornea unlikely to withstand phacoemulsification or MSICS  Needs Descemet's membrane endothelial keratoplasty (DMEK)/Partial thickness corneal transplant endothelial keratoplasty  Would also benefit greatly from resident/fellow toric with regular against the rule astigmatism    (H52.13,  H52.203,  H52.4) Myopia of both eyes with astigmatism and presbyopia  Hold pending Cataract extraction/intraocular lens     (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        Dagoberto Alex MD     Attending Physician Attestation:  Complete documentation of historical and exam  elements from today's encounter can be found in the full encounter summary report (not reduplicated in this progress note).  I personally obtained the chief complaint(s) and history of present illness.  I confirmed and edited as necessary the review of systems, past medical/surgical history, family history, social history, and examination findings as documented by others; and I examined the patient myself.  I personally reviewed the relevant tests, images, and reports as documented above.  I formulated and edited as necessary the assessment and plan and discussed the findings and management plan with the patient and family. - Dagoberto Alex MD

## 2023-12-22 ENCOUNTER — OFFICE VISIT (OUTPATIENT)
Dept: OPHTHALMOLOGY | Facility: CLINIC | Age: 88
End: 2023-12-22
Attending: OPHTHALMOLOGY
Payer: COMMERCIAL

## 2023-12-22 DIAGNOSIS — H18.513 FUCHS' CORNEAL DYSTROPHY OF BOTH EYES: ICD-10-CM

## 2023-12-22 DIAGNOSIS — H25.813 COMBINED FORMS OF AGE-RELATED CATARACT OF BOTH EYES: Primary | ICD-10-CM

## 2023-12-22 PROCEDURE — G0463 HOSPITAL OUTPT CLINIC VISIT: HCPCS | Performed by: OPHTHALMOLOGY

## 2023-12-22 PROCEDURE — 99214 OFFICE O/P EST MOD 30 MIN: CPT | Mod: GC | Performed by: OPHTHALMOLOGY

## 2023-12-22 ASSESSMENT — TONOMETRY
OD_IOP_MMHG: 06
OS_IOP_MMHG: 05
IOP_METHOD: ICARE

## 2023-12-22 ASSESSMENT — VISUAL ACUITY
OD_SC: 20/100
OS_SC: 20/200
METHOD: SNELLEN - LINEAR
OD_SC+: -1

## 2023-12-22 ASSESSMENT — SLIT LAMP EXAM - LIDS
COMMENTS: NORMAL
COMMENTS: NORMAL

## 2023-12-22 NOTE — PROGRESS NOTES
Chief complaint   Cataract and corneal edema    HPI    Radha Mcmanus 89 year old female referred for cataract surgery and evaluation of corneal edema. Patient is here with her son. She reports her vision has worsened in both eyes, left eye worse than right eye in the past several months and she would like to see better.       Chief Complaint(s) and History of Present Illness(es)       Fuch's Dystrophy Evaluation               Comments    Pt here with her son today, son to interpret.  Pt states vision is the same as last visit. LE tearing for the past 2-3 months.  No new redness or dryness.  Pt reports having dizziness when getting up that comes and goes for the past 3-4 days.    Thomas Marrero [facility] December 22, 2023 7:58 AM                         Past ocular history   Prior eye surgery/laser/Trauma: No  CTL wearer:No  Glasses : Yes for distance  Family Hx of eye disease: -    PMH     Past Medical History:   Diagnosis Date    Hypertension     Ulcer        PSH     Past Surgical History:   Procedure Laterality Date    ORTHOPEDIC SURGERY         Meds     Current Outpatient Medications   Medication    acetaminophen (TYLENOL) 500 MG tablet    alendronate (FOSAMAX) 70 MG tablet    amLODIPine (NORVASC) 5 MG tablet    aspirin (ASA) 81 MG EC tablet    Calcium Citrate-Vitamin D 250-200 MG-UNIT TABS    fluticasone (FLONASE) 50 MCG/ACT nasal spray    levothyroxine (SYNTHROID/LEVOTHROID) 25 MCG tablet    meclizine (ANTIVERT) 25 MG tablet    olopatadine (PATADAY) 0.2 % ophthalmic solution    omeprazole (PRILOSEC) 20 MG DR capsule    ORDER FOR DME    polyethylene glycol (MIRALAX) 17 g packet    tiZANidine (ZANAFLEX) 2 MG tablet     No current facility-administered medications for this visit.       Labs   -    Imaging   Pentacam   Endothelial spectroscopy    Drops Currently Taking   Artificial tears    Assessment/Plan   # Fuchs endothelial dystrophy, both eyes   Advanced corneal edema with indetectable cell number left eye and  right eye 900    - Discussed R/B/A for DMEK in the both eye including infection, detachment of graft, failure.  Patient agree to proceed with the surgery. Told her she will need to stay on the back for few days and there might be a need for reinjection of gaz    #Nuclear sclerosis, both eyes  #Senile Cataracts OU  Patient is having difficulty with daily activities due to visual impairment. Patient feels that they are no longer managing with current correction and would like to move forward with cataract surgery. Explained benefits alternatives and risks including but not limited to loss of vision, severe infection, retinal detachment, need for more surgery, visual disturbances etc...  Informed patient that reaching uncorrected vision aim (without glasses) after cataract surgery is not certain. Made patient aware they may need glasses, laser vision correction or in worst case scenario, IOL exchange.      Dilates well  no PXF  no Flomax  no guttae    Attending cataract removal only  -Aim: emmetropia  - dominant eye  -Previous refractive surgery status:no  -Corneal astigmatism yes ** right eye 6.7D ATR, left eye 5.4D ATR  No DM, has hypertension      #Pinguecula, both eyes  - Continue artificial tears    Follow up:  Oph: after surgery    Anna Massey MD  Cornea and External Disease Fellow  [facility]       Attending Physician Attestation:  Complete documentation of historical and exam elements from today's encounter can be found in the full encounter summary report (not reduplicated in this progress note).  I personally obtained the chief complaint(s) and history of present illness.  I confirmed and edited as necessary the review of systems, past medical/surgical history, family history, social history, and examination findings as documented by others; and I examined the patient myself.  I personally reviewed the relevant tests, images, and reports as documented above.  I formulated and edited as  necessary the assessment and plan and discussed the findings and management plan with the patient and family. - Derek Guy MD

## 2023-12-22 NOTE — NURSING NOTE
Chief Complaints and History of Present Illnesses   Patient presents with    Fuch's Dystrophy Evaluation     Chief Complaint(s) and History of Present Illness(es)       Fuch's Dystrophy Evaluation               Comments    Pt here with her son today, son to interpret.  Pt states vision is the same as last visit. LE tearing for the past 2-3 months.  No new redness or dryness.  Pt reports having dizziness when getting up that comes and goes for the past 3-4 days.    SARAH Lewis December 22, 2023 7:58 AM

## 2023-12-26 ENCOUNTER — HOSPITAL ENCOUNTER (OUTPATIENT)
Facility: AMBULATORY SURGERY CENTER | Age: 88
Discharge: HOME | End: 2023-12-26
Attending: OPHTHALMOLOGY
Payer: COMMERCIAL

## 2023-12-26 ENCOUNTER — HOSPITAL ENCOUNTER (OUTPATIENT)
Facility: AMBULATORY SURGERY CENTER | Age: 88
End: 2023-12-26
Attending: OPHTHALMOLOGY
Payer: COMMERCIAL

## 2023-12-26 ENCOUNTER — TELEPHONE (OUTPATIENT)
Dept: OPHTHALMOLOGY | Facility: CLINIC | Age: 88
End: 2023-12-26
Payer: COMMERCIAL

## 2023-12-26 PROBLEM — H25.813 COMBINED FORMS OF AGE-RELATED CATARACT OF BOTH EYES: Status: ACTIVE | Noted: 2023-12-22

## 2023-12-26 PROBLEM — H18.513 FUCHS' CORNEAL DYSTROPHY OF BOTH EYES: Status: ACTIVE | Noted: 2023-12-22

## 2023-12-26 NOTE — TELEPHONE ENCOUNTER
Called patient to schedule surgery with Dr. Guy    Date of Surgery: 2/15,5/9    Location of surgery: [facility]    Pre-Op H&P: PCP    Pre/Post Imaging:  No    Post-Op Appt Date: 2/16,2/23,3/13. And  5/10,5/17,6/7    Surgery Packet Mailed: yes    Additional comments: Spoke with patient. They are aware of above dates, printed dates of all follow ups sent within the packet, will review and reach out with any questions           Anna C. Schoenecker on 12/26/2023 at 9:56 AM

## 2023-12-29 ENCOUNTER — OFFICE VISIT (OUTPATIENT)
Dept: URGENT CARE | Facility: URGENT CARE | Age: 88
End: 2023-12-29
Payer: COMMERCIAL

## 2023-12-29 VITALS
BODY MASS INDEX: 22.47 KG/M2 | WEIGHT: 135 LBS | DIASTOLIC BLOOD PRESSURE: 77 MMHG | OXYGEN SATURATION: 98 % | HEART RATE: 76 BPM | TEMPERATURE: 97.4 F | RESPIRATION RATE: 24 BRPM | SYSTOLIC BLOOD PRESSURE: 133 MMHG

## 2023-12-29 DIAGNOSIS — H81.10 BENIGN PAROXYSMAL POSITIONAL VERTIGO, UNSPECIFIED LATERALITY: Primary | ICD-10-CM

## 2023-12-29 DIAGNOSIS — R07.89 OTHER CHEST PAIN: ICD-10-CM

## 2023-12-29 PROCEDURE — 99213 OFFICE O/P EST LOW 20 MIN: CPT | Mod: 25 | Performed by: FAMILY MEDICINE

## 2023-12-29 PROCEDURE — 93000 ELECTROCARDIOGRAM COMPLETE: CPT | Performed by: FAMILY MEDICINE
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
## 2024-01-10 ENCOUNTER — PATIENT OUTREACH (OUTPATIENT)
Dept: GERIATRIC MEDICINE | Facility: CLINIC | Age: 89
End: 2024-01-10
Payer: COMMERCIAL

## 2024-01-10 NOTE — PROGRESS NOTES
[facility] Care Coordination Contact    Called adult son Ariel Franklin  to schedule annual HRA home visit. HRA has been scheduled for 1/11/24.    Tello Ferris RN BSN PHN  [facility] Care Coordinator  [phone]  Fax:[phone]

## 2024-01-11 ENCOUNTER — OFFICE VISIT (OUTPATIENT)
Dept: INTERNAL MEDICINE | Facility: CLINIC | Age: 89
End: 2024-01-11
Payer: COMMERCIAL

## 2024-01-11 ENCOUNTER — PATIENT OUTREACH (OUTPATIENT)
Dept: GERIATRIC MEDICINE | Facility: CLINIC | Age: 89
End: 2024-01-11

## 2024-01-11 VITALS
HEIGHT: 65 IN | BODY MASS INDEX: 23.59 KG/M2 | RESPIRATION RATE: 16 BRPM | TEMPERATURE: 98.6 F | WEIGHT: 141.6 LBS | HEART RATE: 87 BPM | DIASTOLIC BLOOD PRESSURE: 70 MMHG | OXYGEN SATURATION: 98 % | SYSTOLIC BLOOD PRESSURE: 130 MMHG

## 2024-01-11 DIAGNOSIS — Z23 NEED FOR VACCINATION: ICD-10-CM

## 2024-01-11 DIAGNOSIS — R42 VERTIGO: ICD-10-CM

## 2024-01-11 DIAGNOSIS — H81.10 BENIGN PAROXYSMAL POSITIONAL VERTIGO, UNSPECIFIED LATERALITY: Primary | ICD-10-CM

## 2024-01-11 DIAGNOSIS — G44.209 TENSION HEADACHE: ICD-10-CM

## 2024-01-11 DIAGNOSIS — R10.13 EPIGASTRIC PAIN: ICD-10-CM

## 2024-01-11 DIAGNOSIS — H57.9 ITCHY EYES: ICD-10-CM

## 2024-01-11 DIAGNOSIS — E03.9 ACQUIRED HYPOTHYROIDISM: ICD-10-CM

## 2024-01-11 LAB
BASOPHILS # BLD AUTO: 0 10E3/UL (ref 0–0.2)
BASOPHILS NFR BLD AUTO: 1 %
EOSINOPHIL # BLD AUTO: 0.3 10E3/UL (ref 0–0.7)
EOSINOPHIL NFR BLD AUTO: 5 %
ERYTHROCYTE [DISTWIDTH] IN BLOOD BY AUTOMATED COUNT: 12.9 % (ref 10–15)
HCT VFR BLD AUTO: 40.6 % (ref 35–47)
HGB BLD-MCNC: 13.5 G/DL (ref 11.7–15.7)
IMM GRANULOCYTES # BLD: 0 10E3/UL
IMM GRANULOCYTES NFR BLD: 0 %
LYMPHOCYTES # BLD AUTO: 2.5 10E3/UL (ref 0.8–5.3)
LYMPHOCYTES NFR BLD AUTO: 48 %
MCH RBC QN AUTO: 29.4 PG (ref 26.5–33)
MCHC RBC AUTO-ENTMCNC: 33.3 G/DL (ref 31.5–36.5)
MCV RBC AUTO: 89 FL (ref 78–100)
MONOCYTES # BLD AUTO: 0.4 10E3/UL (ref 0–1.3)
MONOCYTES NFR BLD AUTO: 8 %
NEUTROPHILS # BLD AUTO: 2.1 10E3/UL (ref 1.6–8.3)
NEUTROPHILS NFR BLD AUTO: 38 %
PLATELET # BLD AUTO: 153 10E3/UL (ref 150–450)
RBC # BLD AUTO: 4.59 10E6/UL (ref 3.8–5.2)
WBC # BLD AUTO: 5.3 10E3/UL (ref 4–11)

## 2024-01-11 PROCEDURE — 36415 COLL VENOUS BLD VENIPUNCTURE: CPT | Performed by: INTERNAL MEDICINE

## 2024-01-11 PROCEDURE — 80053 COMPREHEN METABOLIC PANEL: CPT | Performed by: INTERNAL MEDICINE

## 2024-01-11 PROCEDURE — 90662 IIV NO PRSV INCREASED AG IM: CPT | Performed by: INTERNAL MEDICINE

## 2024-01-11 PROCEDURE — 85025 COMPLETE CBC W/AUTO DIFF WBC: CPT | Performed by: INTERNAL MEDICINE

## 2024-01-11 PROCEDURE — 84439 ASSAY OF FREE THYROXINE: CPT | Performed by: INTERNAL MEDICINE

## 2024-01-11 PROCEDURE — 83690 ASSAY OF LIPASE: CPT | Performed by: INTERNAL MEDICINE

## 2024-01-11 PROCEDURE — 99214 OFFICE O/P EST MOD 30 MIN: CPT | Mod: 25 | Performed by: INTERNAL MEDICINE

## 2024-01-11 PROCEDURE — 84443 ASSAY THYROID STIM HORMONE: CPT | Performed by: INTERNAL MEDICINE

## 2024-01-11 PROCEDURE — G0008 ADMIN INFLUENZA VIRUS VAC: HCPCS | Performed by: INTERNAL MEDICINE

## 2024-01-11 RX ORDER — OLOPATADINE HYDROCHLORIDE 2 MG/ML
1 SOLUTION/ DROPS OPHTHALMIC DAILY
Qty: 2.5 ML | Refills: 11 | Status: SHIPPED | OUTPATIENT
Start: 2024-01-11

## 2024-01-11 RX ORDER — MECLIZINE HYDROCHLORIDE 25 MG/1
25 TABLET ORAL
Qty: 30 TABLET | Refills: 6 | Status: SHIPPED | OUTPATIENT
Start: 2024-01-11

## 2024-01-11 ASSESSMENT — PAIN SCALES - GENERAL: PAINLEVEL: MODERATE PAIN (5)

## 2024-01-11 ASSESSMENT — ENCOUNTER SYMPTOMS
HEADACHES: 1
BACK PAIN: 1

## 2024-01-11 NOTE — PROGRESS NOTES
Assessment & Plan     Benign paroxysmal positional vertigo, unspecified laterality  In the past vestibular physical therapy helped, will repeat, she can take meclizine as needed, discussed not to bend all the way down is her head when she is praying and use pillows at night to help prevent worsening symptoms.  - Physical Therapy Referral; Future  - meclizine (ANTIVERT) 25 MG tablet; Take 1 tablet (25 mg) by mouth nightly as needed for dizziness    Itchy eyes  - olopatadine (PATADAY) 0.2 % ophthalmic solution; Place 0.05 mLs (1 drop) into both eyes daily    Tension headache  This has been going on for a while since she fell a year ago.  She has experienced intermittent nausea, ESR and CRP has been normal, in addition due to persistent vertigo, will check MRI  - MR Brain w/o Contrast; Future    Need for vaccination  She will get flu vaccine today, discussed to get COVID-vaccine at the pharmacy  - INFLUENZA VACCINE 65+ (FLUZONE HD)    Epigastric pain  This is chronic, she takes omeprazole before bedtime, discussed to start taking it before dinner.  Will check labs.  - Comprehensive metabolic panel  - Lipase  - CBC with platelets and differential    Acquired hypothyroidism  She is on small dose of Synthroid.  - TSH with free T4 reflex    Anjelica Kapadia MD  [facility]    Subjective   Radha is a 89 year old, presenting for the following health issues:  Dizziness (Pt reports feeling dizziness for about 1 week. Had therapy in the past for vertigo. ), Back Pain (Lower back pain - new- in the past 4 days. Pt reports not taking anything for the back pain. ), Derm Problem (Pt would like lump on Right wrist assessed. Pt reports that it comes and goes and denies pain. ), Headache (Left side tenderness radiates under armpit), Breast Pain (Reports pain on left side and goes into armpit), and Surgery (Upcoming surgery 2/15/24 - [facility] - pt and son to schedule pre-op after today's appt.  ")        1/11/2024     1:26 PM   Additional Questions   Roomed by Palma   Accompanied by Son       History of Present Illness       Reason for visit:  Dizziness    She eats 2-3 servings of fruits and vegetables daily.She consumes 1 sweetened beverage(s) daily.She exercises with enough effort to increase her heart rate 9 or less minutes per day.  She exercises with enough effort to increase her heart rate 3 or less days per week.   She is taking medications regularly.     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  She is currently here accompanied by her son who is translating.       Radha's has gotten worse.  She does have recurrent symptoms often.  Last year she has had vestibular physical therapy done and per son was free of symptoms for a year but currently they have worsened.  She also endorses headache.  More on top of her head.  She fell on ice last winter and that is when the headache started.  Currently she feels that her vertigo is more disturbing than a headache.  Previously ESR and CRP were normal.  She experiences occasional nausea due to the headache and vertigo.  She denies any changes in her hearing, vision has been poor due to corneal problems, she will need corneal implant and cataract surgery in the near future.    She continues to have left chest wall pain reproducible with palpation.    Review of Systems   Musculoskeletal:  Positive for back pain.   Neurological:  Positive for headaches.      Intermittent constipation, she does not like to use MiraLAX daily      Objective    /70 (BP Location: Left arm, Patient Position: Sitting, Cuff Size: Adult Regular)   Pulse 87   Temp 98.6  F (37  C) (Tympanic)   Resp 16   Ht 1.651 m (5' 5\")   Wt 64.2 " kg (141 lb 9.6 oz)   LMP  (LMP Unknown)   SpO2 98%   BMI 23.56 kg/m    Body mass index is 23.56 kg/m .  Physical Exam   General: Very pleasant.  In mild distress due to vertigo female, alert and oriented x3  EYES: Eyelids, conjunctiva, and sclera were normal. Pupils were normal.   HEAD, EARS, NOSE, MOUTH, AND THROAT: no cervical LAD, no thyromegaly or nodules appreciated. TMs are visualized and normal, oropharynx is clear.  RESPIRATORY: respirations non labored, CTA bl, no wheezes, rales, no forced expiratory wheezing.  CARDIOVASCULAR: Heart rate and rhythm were normal. No murmurs, rubs,gallops. There was no peripheral edema.  There is tenderness to palpation of her left lateral chest wall.  GASTROINTESTINAL: Positive bowel sounds, abdomen is soft, non tender, non distended.     MUSCULOSKELETAL: Muscle mass was normal for age. No joint synovitis or deformity.  LYMPHATIC: There were no enlarged nodes palpable.  SKIN/HAIR/NAILS: Skin color was normal.  No rashes.  NEUROLOGIC: The patient was alert and oriented.  Speech was normal.  There is no facial asymmetry.   PSYCHIATRIC:  Mood and affect were normal.

## 2024-01-11 NOTE — PROGRESS NOTES
[facility] Care Coordination Contact    [facility] Home Visit Assessment     Home visit for Health Risk Assessment with Radha Mcmanus completed on January 11, 2024    Type of residence:: Apartment  Current living arrangement:: I live alone     Assessment completed with:: Patient, Said (son)    Current Care Plan  Member currently receiving the following home care services:     Member currently receiving the following community resources: PCA, County Programs, Day Care      Medication Review  Medication reconciliation completed in Epic: Yes  Medication set-up & administration: Independent-does not set up.Takes fewer meds straight from the bottles.   Family caregiver administers medications.  Medication Risk Assessment Medication (1 or more, place referral to MTM): N/A: No risk factors identified  MTM Referral Placed: No: Takes fewer meds administered by family. No concerns noted.     Mental/Behavioral Health   Depression Screening:           Mental health DX:: No        Falls Assessment:   Fallen 2 or more times in the past year?: No   Any fall with injury in the past year?: No    ADL/IADL Dependencies:   Dependent ADLs:: Ambulation-cane, Bathing, Dressing, Eating, Grooming, Toileting, Transfers  Dependent IADLs:: Cleaning, Cooking, Laundry, Shopping, Meal Preparation, Medication Management, Money Management, Transportation    Health Plan sponsored benefits: [facility]: Shared information regarding [employer]. Reviewed preventative health screening and health plan supplemental benefits/incentives. Reviewed medication disposal form.    PCA Assessment completed at visit: Yes Annual PCA assessment indicated 6.75 hours per day of PCA. This is the same as the previous assessment.     Elderly Waiver Eligibility: Yes-will continue on EW    Care Plan & Recommendations: No changes to current POC or services.     See LTCC for detailed assessment information.    Follow-Up Plan: Member informed of  future contact, plan to f/u with member with a 6 month telephone assessment.  Contact information shared with member and family, encouraged member to call with any questions or concerns at any time.    [place] care continuum providers: Please see Snapshot and Care Management Flowsheets for Specific details of care plan.    This CC note routed to PCP, Anjelica Kapadia.    Tello Ferris RN BSN PHN  [facility] Care Coordinator  [phone]  Fax:[phone]

## 2024-01-11 NOTE — PATIENT INSTRUCTIONS
Try Vestibular PT to help with vertigo/dizziness.     2. Have brain MRI done    3. Flu shot today    4. Can try Lidocaine 4% patch for left chest wall pain. Ask pharmacist. Do not use any heating pads on the patch.    5. Can try psyllium powder in large glass of water for constipation.

## 2024-01-12 LAB
ALBUMIN SERPL BCG-MCNC: 4.2 G/DL (ref 3.5–5.2)
ALP SERPL-CCNC: 59 U/L (ref 40–150)
ALT SERPL W P-5'-P-CCNC: 13 U/L (ref 0–50)
ANION GAP SERPL CALCULATED.3IONS-SCNC: 11 MMOL/L (ref 7–15)
AST SERPL W P-5'-P-CCNC: 24 U/L (ref 0–45)
BILIRUB SERPL-MCNC: 0.4 MG/DL
BUN SERPL-MCNC: 16.7 MG/DL (ref 8–23)
CALCIUM SERPL-MCNC: 9.6 MG/DL (ref 8.2–9.6)
CHLORIDE SERPL-SCNC: 99 MMOL/L (ref 98–107)
CREAT SERPL-MCNC: 0.78 MG/DL (ref 0.51–0.95)
DEPRECATED HCO3 PLAS-SCNC: 27 MMOL/L (ref 22–29)
EGFRCR SERPLBLD CKD-EPI 2021: 70 ML/MIN/1.73M2
GLUCOSE SERPL-MCNC: 94 MG/DL (ref 70–99)
LIPASE SERPL-CCNC: 48 U/L (ref 13–60)
POTASSIUM SERPL-SCNC: 3.4 MMOL/L (ref 3.4–5.3)
PROT SERPL-MCNC: 7.8 G/DL (ref 6.4–8.3)
SODIUM SERPL-SCNC: 137 MMOL/L (ref 135–145)
T4 FREE SERPL-MCNC: 1.25 NG/DL (ref 0.9–1.7)
TSH SERPL DL<=0.005 MIU/L-ACNC: 4.72 UIU/ML (ref 0.3–4.2)

## 2024-01-14 ENCOUNTER — ANCILLARY PROCEDURE (OUTPATIENT)
Dept: MRI IMAGING | Facility: CLINIC | Age: 89
End: 2024-01-14
Attending: INTERNAL MEDICINE
Payer: COMMERCIAL

## 2024-01-14 DIAGNOSIS — G44.209 TENSION HEADACHE: ICD-10-CM

## 2024-01-14 DIAGNOSIS — R42 VERTIGO: ICD-10-CM

## 2024-01-14 PROCEDURE — 70551 MRI BRAIN STEM W/O DYE: CPT | Mod: 52 | Performed by: RADIOLOGY

## 2024-01-16 ENCOUNTER — TELEPHONE (OUTPATIENT)
Dept: INTERNAL MEDICINE | Facility: CLINIC | Age: 89
End: 2024-01-16
Payer: COMMERCIAL

## 2024-01-17 ENCOUNTER — THERAPY VISIT (OUTPATIENT)
Dept: PHYSICAL THERAPY | Facility: REHABILITATION | Age: 89
End: 2024-01-17
Attending: INTERNAL MEDICINE
Payer: COMMERCIAL

## 2024-01-17 DIAGNOSIS — H81.10 BENIGN PAROXYSMAL POSITIONAL VERTIGO, UNSPECIFIED LATERALITY: ICD-10-CM

## 2024-01-17 PROCEDURE — 97161 PT EVAL LOW COMPLEX 20 MIN: CPT | Mod: GP | Performed by: PHYSICAL THERAPIST

## 2024-01-17 PROCEDURE — 95992 CANALITH REPOSITIONING PROC: CPT | Mod: GP | Performed by: PHYSICAL THERAPIST

## 2024-01-17 NOTE — PROGRESS NOTES
PHYSICAL THERAPY EVALUATION  Type of Visit: Evaluation    See electronic medical record for Abuse and Falls Screening details.    Subjective   Patient reports feels dizziness turns to side, up/down, trying to lay down house is spinning and bed also spinning. Started the beginning of January. Feels okay when still but when turns or moves head feels dizzy. Couple seconds.      Presenting condition or subjective complaint: dizziness  Date of onset: 01/11/24    Relevant medical history: High blood pressure   Past Medical History:   Diagnosis Date    Hypertension     Ulcer       Patient Active Problem List   Diagnosis    Lumbar compression fracture (H)    Influenza B    Costochondritis    Age related osteoporosis, unspecified pathological fracture presence    Vertigo    Vitamin D deficiency    Tension headache    Combined forms of age-related cataract of both eyes    Fuchs' corneal dystrophy of both eyes      Dates & types of surgery:    Past Surgical History:   Procedure Laterality Date    ORTHOPEDIC SURGERY        Prior diagnostic imaging/testing results: MRI     Prior therapy history for the same diagnosis, illness or injury: Yes PT vestibular    Prior Level of Function  Transfers: Independent  Ambulation: Independent  ADL: Assistive person  IADL:  assistance    Living Environment  Social support: Alone   Type of home: Apartment/condo   Stairs to enter the home: No       Ramp: No   Stairs inside the home: No       Help at home: Self Cares (home health aide/personal care attendant, family, etc); Home and Yard maintenance tasks  Equipment owned: Straight Cane; Walker     Employment: No    Hobbies/Interests: walking    Patient goals for therapy: turn head, bend over, bed mobility dizziness, walking head turning    Pain assessment:  Patient at neck with ROM typical of cerivcogenic pain     Objective      Cognitive Status Examination  Orientation: Oriented to person, place and time   Level of Consciousness: Alert  Follows  Commands and Answers Questions: 100% of the time  Personal Safety and Judgement: Intact  Memory:  language difference makes it difficult to assess cognition, no obvious impairment    OBSERVATION: uses HHA to walk  INTEGUMENTARY: Intact  POSTURE: WFL  PALPATION: TTP at SO muscles, and cervical spine, tension at UT and levator scap  RANGE OF MOTION:  see below limited cervical ROM  STRENGTH:  generalized weakness 3-4/5 based on functional mobility, sit to stand and bed mobility    BED MOBILITY:  Mod I at home assist on plinth    TRANSFERS:  Mod I heavy use of hands        GAIT:   Level of [place]: Min Assist  Assistive Device(s): None  Gait Deviations: Stride length decreased  Nelida decreased  Gait Distance: 70 feet back to treatment room  Stairs: not assessed    BALANCE: Sitting Balance (static):Good  Sitting Balance (dynamic):Fair  Sit to Stand Balance:Fair  Standing Balance (static):Fair  Standing Balance (dynamic):Poor    Future as indicated         VESTIBULAR EVALUATION  ADDITIONAL HISTORY:  Description of symptoms: I feel like I am spinning; I feel like the room is spinning  Dizzy attacks:   Start: Begining of January   Last attack: this morning, feels it most strongly in the morning   Frequency of occurrences: with head movement, turning, bending   Length of attack: 30-40 seconds  Difficulty hearing: Both ears  Noise in ears? Yes ringing  Alleviates symptoms: staying still  Worsens symptoms:    Activities that bring on symptoms: Bending over; Reaching up  getting up and laying down    Pertinent visual history: poor vision no new change, scheduled for cataract surgery  Pertinent history of current vestibular problem:   DHI:      Cervicogenic Screen    Neck ROM Limited by 25% pain increased going forward tight muscle, SB pain to R > L, rotation pain R > L   Vertebral Artery Test Patient had neck pain and head ache with both positions, headache was very specific cervicogenic referral to head   Alar Ligament  Test Normal   Transverse Ligament Test Normal   Distraction    Neck Torsion Test (head still, body rotating)    Neck Torsion Test (head and body rotating)         Oculomotor Screen    Ocular ROM    Smooth Pursuit    Saccades    VOR    VOR Cancellation    Head Impulse Test    Convergence Testing         Infrared Goggle Exam Vestibular Suppressant in Last 24 Hours?  unknown  Exam Completed With:  Room light due to age and head covering   Spontaneous Nystagmus Negative   Gaze Evoked Nystagmus Negative   Head Shake Horizontal Nystagmus    Positional Testing Upbeating L torsional   Supine Head-Hanging Test     Left Right   [place], modified with use of 2 pillows due to age Upbeating L torsional Positive for dizziness nystagmus not observed   Sidelying Test     [facility] Supine Roll Test     [facility] Forward Roll Test     [place] and Lean Test -  Sitting Erect    [place] and Lean Test - Seated, Head Bent 60 Degrees Forward    [place] and Lean Test - Seated, Head Bent Backwards       BPPV Canal(s): L Posterior  BPPV Type: Canalithasis 40 second duration of symptoms on L with immediate onset    Dynamic Visual Acuity (DVA)    Static Acuity (LogMar)    Horizontal Head Movement at 1 Hz (LogMar)    Horizontal Head Movement at 2 Hz (LogMar)           Assessment & Plan   CLINICAL IMPRESSIONS  Medical Diagnosis: H81.10 (ICD-10-CM) - Benign paroxysmal positional vertigo, unspecified laterality    Treatment Diagnosis: BPPV unspecified laterality, neck pain   Impression/Assessment: Patient is a 89 year old female with dizziness consistent with L posterior canal BPPV along with neck pain. The following significant findings have been identified: Pain, Decreased ROM/flexibility, Decreased joint mobility, Decreased strength, Impaired balance, Impaired gait, Impaired muscle performance, Decreased activity tolerance, and Impaired posture. These impairments interfere with their ability to perform self care tasks, recreational activities, household chores,  household mobility, and community mobility as compared to previous level of function.     Clinical Decision Making (Complexity):  Clinical Presentation: Stable/Uncomplicated  Clinical Presentation Rationale: based on medical and personal factors listed in PT evaluation  Clinical Decision Making (Complexity): Low complexity    PLAN OF CARE  Treatment Interventions:  Interventions: Gait Training, Manual Therapy, Neuromuscular Re-education, Therapeutic Activity, Therapeutic Exercise, Self-Care/Home Management, Canalith Repositioning    Long Term Goals     PT Goal 1  Goal Identifier: walking  Goal Description: Patient will be able to resume walking hallways in apartment complex for exercise without fear of falling.  Rationale: to maximize safety and independence with performance of ADLs and functional tasks;to maximize safety and independence within the home;to maximize safety and independence within the community  Goal Progress: initiated  Target Date: 03/27/24  PT Goal 2  Goal Identifier: bending over, looking up  Goal Description: Patient will be able to bend over and look up without room spinning dizziness.  Rationale: to maximize safety and independence with performance of ADLs and functional tasks;to maximize safety and independence within the home;to maximize safety and independence within the community  Goal Progress: initiated  Target Date: 03/27/24  PT Goal 3  Goal Identifier: bed mobility and transfers in/out of bed  Goal Description: Patient will be able to complete bed mobility and transfers in/out of bed.  Rationale: to maximize safety and independence with performance of ADLs and functional tasks;to maximize safety and independence within the home;to maximize safety and independence within the community  Goal Progress: initiated  Target Date: 03/27/24  PT Goal 4  Goal Identifier: HEP  Goal Description: Patient will be able to complete exercises as prescribed for progression to independent  management.  Rationale: to maximize safety and independence with performance of ADLs and functional tasks;to maximize safety and independence within the home;to maximize safety and independence within the community  Goal Progress: initiated  Target Date: 03/27/24      Frequency of Treatment: 1 x weekly-every other week  Duration of Treatment: 10 weeks (dependent on appointment availability)    Recommended Referrals to Other Professionals:  none at this time  Education Assessment:   Learner/Method: Patient;Listening;Demonstration;Pictures/Video    Risks and benefits of evaluation/treatment have been explained.   Patient/Family/caregiver agrees with Plan of Care.     Evaluation Time:     PT Eval, Low Complexity Minutes (46387): 30       Signing Clinician: Jailene Dumont PT      [facility]                                                                                   OUTPATIENT PHYSICAL THERAPY      PLAN OF TREATMENT FOR OUTPATIENT REHABILITATION   Patient's Last Name, First Name, TRAERedSAROJRed  YonathanRadha  S YOB: 1931   Provider's Name   [facility]   Medical Record No.  [id number]     Onset Date: 01/11/24  Start of Care Date: 01/17/24     Medical Diagnosis:  H81.10 (ICD-10-CM) - Benign paroxysmal positional vertigo, unspecified laterality      PT Treatment Diagnosis:  BPPV unspecified laterality, neck pain Plan of Treatment  Frequency/Duration: 1 x weekly-every other week/ 10 weeks (dependent on appointment availability)    Certification date from 01/17/24 to 03/27/24         See note for plan of treatment details and functional goals     Jailene Dumont PT                         I CERTIFY THE NEED FOR THESE SERVICES FURNISHED UNDER        THIS PLAN OF TREATMENT AND WHILE UNDER MY CARE     (Physician attestation of this document indicates review and certification of the therapy plan).              Referring Provider:  Anjelica  Kang    Initial Assessment  See Epic Evaluation- Start of Care Date: 01/17/24

## 2024-01-17 NOTE — TELEPHONE ENCOUNTER
Please t pt's son know results:  Labs look good, red cell count, liver, kidney function, sugar are good.  Thyroid level is appropriate for her age.    Brain MRI is limited since they had to stop MRI imaging early due to pt request, it shows some brain atrophy due to aging which can affect memory, balance and mobility, but no masses or old strokes were seen.    DR GAMBOA   
Pts son returned call - please try to reach him again  
Relayed results  message to patient's son Said on consent to communicate.    
distal

## 2024-01-20 DIAGNOSIS — I10 ESSENTIAL (PRIMARY) HYPERTENSION: ICD-10-CM

## 2024-01-20 DIAGNOSIS — K21.9 GASTRO-ESOPHAGEAL REFLUX DISEASE WITHOUT ESOPHAGITIS: ICD-10-CM

## 2024-01-22 RX ORDER — AMLODIPINE BESYLATE 5 MG/1
TABLET ORAL
Qty: 90 TABLET | Refills: 1 | Status: SHIPPED | OUTPATIENT
Start: 2024-01-22 | End: 2024-07-22

## 2024-01-23 ENCOUNTER — THERAPY VISIT (OUTPATIENT)
Dept: PHYSICAL THERAPY | Facility: REHABILITATION | Age: 89
End: 2024-01-23
Payer: COMMERCIAL

## 2024-01-23 ENCOUNTER — PATIENT OUTREACH (OUTPATIENT)
Dept: GERIATRIC MEDICINE | Facility: CLINIC | Age: 89
End: 2024-01-23

## 2024-01-23 DIAGNOSIS — H81.10 BENIGN PAROXYSMAL POSITIONAL VERTIGO, UNSPECIFIED LATERALITY: ICD-10-CM

## 2024-01-23 DIAGNOSIS — G44.209 TENSION HEADACHE: Primary | ICD-10-CM

## 2024-01-23 PROCEDURE — 97140 MANUAL THERAPY 1/> REGIONS: CPT | Mod: GP | Performed by: PHYSICAL THERAPIST

## 2024-01-23 PROCEDURE — 95992 CANALITH REPOSITIONING PROC: CPT | Mod: GP | Performed by: PHYSICAL THERAPIST

## 2024-01-23 PROCEDURE — 97110 THERAPEUTIC EXERCISES: CPT | Mod: GP | Performed by: PHYSICAL THERAPIST

## 2024-01-23 NOTE — TELEPHONE ENCOUNTER
[facility] Care Coordination Contact    Received after visit chart from care coordinator.  Completed following tasks: Mailed copy of care plan/support plan to member, Mailed Safe Medication Disposal , Submitted referrals/auths for ADC and ADC transportation, and Updated services in Database  , Provider Signature - No POC Shared:  Member indicates that they do not want their POC shared with any EW providers.    [facility]:  Emailed required PCA documents to [facility].  Faxed copy of PCA assessment to PCA Agency and mailed copy to member.  Faxed MD Communication to PCP.     Sydni Menjivar  Care Management Specialist  [facility]  [phone]

## 2024-01-23 NOTE — LETTER
January 23, 2024      LASHAY FABIAN  [address]   [place] [address]      Dear Lashay:    At [facility], we re dedicated to improving your health and wellness. Enclosed is the Care Plan developed with you on 01/11/2024. Please review the Care Plan carefully.    As a reminder, during your visit we talked about:  Ways to manage your physical and mental health  Using health care to maintain and improve your health   Your preventive care needs     Remember to contact your care coordinator if you:  Are hospitalized, or plan to be hospitalized   Have a fall    Have a change in your physical or mental health  Need help finding support or services    If you have questions, or don t agree with your Care Plan, call me at [phone]. You can also call me if your needs change. TTY users, call the [place] [employer] at (649) or 1-588.854.7520 (xyuwpo-do-hwunnh relay service).    Sincerely,    Tello Ferris RN, PHN    E-mail: [email or web address]  Phone: [phone]      [facility]) is a health plan that contracts with both Medicare and the [place] Medical Assistance (Medicaid) program to provide benefits of both programs to enrollees. Enrollment in [facility] depends on contract renewal.    [id number] accepted    [id number] (07/2022)

## 2024-01-31 ENCOUNTER — THERAPY VISIT (OUTPATIENT)
Dept: PHYSICAL THERAPY | Facility: REHABILITATION | Age: 89
End: 2024-01-31
Payer: COMMERCIAL

## 2024-01-31 DIAGNOSIS — H81.10 BENIGN PAROXYSMAL POSITIONAL VERTIGO, UNSPECIFIED LATERALITY: ICD-10-CM

## 2024-01-31 DIAGNOSIS — G44.209 TENSION HEADACHE: Primary | ICD-10-CM

## 2024-01-31 PROCEDURE — 97110 THERAPEUTIC EXERCISES: CPT | Mod: GP | Performed by: PHYSICAL THERAPIST

## 2024-01-31 PROCEDURE — 97140 MANUAL THERAPY 1/> REGIONS: CPT | Mod: GP | Performed by: PHYSICAL THERAPIST

## 2024-02-09 ENCOUNTER — PREP FOR PROCEDURE (OUTPATIENT)
Dept: OPHTHALMOLOGY | Facility: CLINIC | Age: 89
End: 2024-02-09

## 2024-02-09 ENCOUNTER — THERAPY VISIT (OUTPATIENT)
Dept: PHYSICAL THERAPY | Facility: REHABILITATION | Age: 89
End: 2024-02-09
Payer: COMMERCIAL

## 2024-02-09 DIAGNOSIS — H81.10 BENIGN PAROXYSMAL POSITIONAL VERTIGO, UNSPECIFIED LATERALITY: ICD-10-CM

## 2024-02-09 DIAGNOSIS — G44.209 TENSION HEADACHE: Primary | ICD-10-CM

## 2024-02-09 PROCEDURE — 97750 PHYSICAL PERFORMANCE TEST: CPT | Mod: GP | Performed by: PHYSICAL THERAPIST

## 2024-02-09 PROCEDURE — 95992 CANALITH REPOSITIONING PROC: CPT | Mod: GP | Performed by: PHYSICAL THERAPIST

## 2024-02-12 ENCOUNTER — TELEPHONE (OUTPATIENT)
Dept: OPHTHALMOLOGY | Facility: CLINIC | Age: 89
End: 2024-02-12
Payer: COMMERCIAL

## 2024-02-12 NOTE — TELEPHONE ENCOUNTER
Attempted to reach patient, per provider patient want to reschedule because she is having dizzy spells. LVM to reschedule. Will attempt call again tomorrow     Anna C. Schoenecker on 2/12/2024 at 3:39 PM

## 2024-02-13 ENCOUNTER — TELEPHONE (OUTPATIENT)
Dept: OPHTHALMOLOGY | Facility: CLINIC | Age: 89
End: 2024-02-13
Payer: COMMERCIAL

## 2024-02-13 NOTE — TELEPHONE ENCOUNTER
Spoke with son. They do not want to move forward with surgery at this time. They will call back once treatment for vertigo is complete  Tissue was cx and all post ops cx    Anna C. Schoenecker on 2/13/2024 at 9:42 AM

## 2024-03-01 ENCOUNTER — THERAPY VISIT (OUTPATIENT)
Dept: PHYSICAL THERAPY | Facility: REHABILITATION | Age: 89
End: 2024-03-01
Payer: COMMERCIAL

## 2024-03-01 DIAGNOSIS — G44.209 TENSION HEADACHE: Primary | ICD-10-CM

## 2024-03-01 DIAGNOSIS — H81.10 BENIGN PAROXYSMAL POSITIONAL VERTIGO, UNSPECIFIED LATERALITY: ICD-10-CM

## 2024-03-01 PROCEDURE — 97140 MANUAL THERAPY 1/> REGIONS: CPT | Mod: GP | Performed by: PHYSICAL THERAPIST

## 2024-03-01 PROCEDURE — 97750 PHYSICAL PERFORMANCE TEST: CPT | Mod: GP | Performed by: PHYSICAL THERAPIST

## 2024-03-01 PROCEDURE — 95992 CANALITH REPOSITIONING PROC: CPT | Mod: GP | Performed by: PHYSICAL THERAPIST

## 2024-03-05 ENCOUNTER — THERAPY VISIT (OUTPATIENT)
Dept: PHYSICAL THERAPY | Facility: REHABILITATION | Age: 89
End: 2024-03-05
Payer: COMMERCIAL

## 2024-03-05 DIAGNOSIS — H81.10 BENIGN PAROXYSMAL POSITIONAL VERTIGO, UNSPECIFIED LATERALITY: ICD-10-CM

## 2024-03-05 DIAGNOSIS — G44.209 TENSION HEADACHE: Primary | ICD-10-CM

## 2024-03-05 PROCEDURE — 95992 CANALITH REPOSITIONING PROC: CPT | Mod: GP | Performed by: PHYSICAL THERAPIST

## 2024-03-05 PROCEDURE — 97140 MANUAL THERAPY 1/> REGIONS: CPT | Mod: GP | Performed by: PHYSICAL THERAPIST

## 2024-03-11 ENCOUNTER — TELEPHONE (OUTPATIENT)
Dept: OPHTHALMOLOGY | Facility: CLINIC | Age: 89
End: 2024-03-11

## 2024-03-11 NOTE — TELEPHONE ENCOUNTER
Called patient to schedule surgery with Dr. Guy    Date of Surgery: 5/*9    Location of surgery: [facility]    Pre-Op H&P: PCP    Pre/Post Imaging:  No    Post-Op Appt Date: 5/10,5/17,6/14    Surgery Packet Mailed: yes    Additional comments: Spoke with patient and son to schedule surgery. They are aware of above dates and will reach out with any questions           Anna C. Schoenecker on 3/11/2024 at 11:22 AM

## 2024-04-09 PROBLEM — H81.10 BENIGN PAROXYSMAL POSITIONAL VERTIGO, UNSPECIFIED LATERALITY: Status: RESOLVED | Noted: 2024-01-17 | Resolved: 2024-04-09

## 2024-04-09 NOTE — PROGRESS NOTES
03/05/24 0500   Appointment Info   Signing clinician's name / credentials Marya Dumont DPT, PT   Total/Authorized Visits 7-9   Visits Used 6   Medical Diagnosis H81.10 (ICD-10-CM) - Benign paroxysmal positional vertigo, unspecified laterality   PT Tx Diagnosis BPPV unspecified laterality, neck pain   Progress Note/Certification   Start of Care Date 01/17/24   Onset of illness/injury or Date of Surgery 01/11/24   Therapy Frequency 1 x weekly-every other week   Predicted Duration 12  (dependent on appointment availability)   Certification date from 01/17/24   Certification date to 03/27/24   Progress Note Due Date 03/27/24   Progress Note Completed Date 03/05/24       Present Yes    Language Maltese    ID or First/Last Name family member  interpreted, patient signed waiver   PT Goal 1   Goal Identifier walking   Goal Description Patient will be able to resume walking hallways in apartment complex for exercise without fear of falling.   Rationale to maximize safety and independence with performance of ADLs and functional tasks;to maximize safety and independence within the home;to maximize safety and independence within the community   Goal Progress in progress, visual improvement in steadiness in gait   Target Date 03/27/24   PT Goal 2   Goal Identifier bending over, looking up   Goal Description Patient will be able to bend over and look up without room spinning dizziness.   Rationale to maximize safety and independence with performance of ADLs and functional tasks;to maximize safety and independence within the home;to maximize safety and independence within the community   Goal Progress MET   Target Date 03/27/24   Date Met 03/05/24   PT Goal 3   Goal Identifier bed mobility and transfers in/out of bed   Goal Description Patient will be able to complete bed mobility and transfers in/out of bed.   Rationale to maximize safety and independence with performance of ADLs  and functional tasks;to maximize safety and independence within the home;to maximize safety and independence within the community   Goal Progress symptoms continue when lying down   Target Date 03/27/24   PT Goal 4   Goal Identifier HEP   Goal Description Patient will be able to complete exercises as prescribed for progression to independent management.   Rationale to maximize safety and independence with performance of ADLs and functional tasks;to maximize safety and independence within the home;to maximize safety and independence within the community   Goal Progress continues to require progression and revision   Target Date 03/27/24   Subjective Report   Subjective Report Only has dizziness when lying down, also continues to have L sided head pain and neck pain with occasional jaw pain.   Therapeutic Procedure/Exercise   PTRx Ther Proc 1 Cervical Retraction With Patient Overpressure   PTRx Ther Proc 1 - Details trialed supine 10 x 3-5 second hold required verbal cue   PTRx Ther Proc 2 Scapular Retraction/Depression   PTRx Ther Proc 2 - Details 5 x 5 second hold required verbal cue   PTRx Ther Proc 3 Shoulder Rolls   PTRx Ther Proc 3 - Details 10 fwd/bkwd needed verbal cue   PTRx Ther Proc 4 Supine Lumbar Hip Roll   PTRx Ther Proc 4 - Details x 5 min required frequent cue for proper speed educated of purpose   Skilled Intervention improved posture, decreased neck pain and head ache   Patient Response/Progress reduced tightness   Infrared Goggle Exam or Frenzel Lense Exam   Vestibular Suppressant in Last 24 Hours? No   Exam completed with Infrared Goggles   Spontaneous Nystagmus Negative   Gaze Evoked Nystagmus Negative   Head Shake Horizontal Nystagmus Negative   [place]-Hallpike (Right) Upbeating R torsional   [place]pike (Left) Negative   Manual Therapy   Manual Therapy: Mobilization, MFR, MLD, friction massage minutes (46219) 20   Manual Therapy 1 STW   Manual Therapy 1 - Details paraspinals, levator scap, Upper  trap, Sub occipitals, tender point head   Skilled Intervention decreased pain, improved mobility, ROM   Patient Response/Progress decreased strain in muscles   Standard Canalith Repositioning   Standard canalith repositioning procedure minutes (79528) 15   Canalith Repositioning - Details eppley for R posterior canalithiasis   Skilled Intervention latia maneuver R posterior canal, extended time in positions 2 min each, reeducation regarding maneuver   Patient Response/Progress verbalized understanding, reported improvement   Physical Performance Test/measures   Physical Performance Test/Measurement, Minutes (06530) 5   Skilled Intervention goggle assessment   Patient Response/Progress jonathan ceron pike L & R   Education   Learner/Method Patient;Listening;Demonstration;Pictures/Video   Plan   Home program PTRx   Plan for next session further vestibular assessment as indicated, jonathan ceron pike, further positional testing, manual therapy and STW along with postural ther ex and balance   Comments   Comments Patient will continue to need skilled PT for BPPV and headache/neck pain   Total Session Time   Timed Code Treatment Minutes 25   Total Treatment Time (sum of timed and untimed services) 40         PLAN  Continue therapy per current plan of care.    Beginning/End Dates of Progress Note Reporting Period:    01/17/2024 to 03/05/2024    Referring Provider:  Anjelica Kapadia

## 2024-04-22 ENCOUNTER — TELEPHONE (OUTPATIENT)
Dept: OPHTHALMOLOGY | Facility: CLINIC | Age: 89
End: 2024-04-22
Payer: COMMERCIAL

## 2024-04-22 NOTE — TELEPHONE ENCOUNTER
FUTURE VISIT INFORMATION      SURGERY INFORMATION:  Date: 24  Location:  or  Surgeon:  Derek Guy MD   Anesthesia Type:  MAC with Retrobulbar   Procedure: PHACOEMULSIFICATION, CATARACT, WITH STANDARD INTRAOCULAR LENS IMPLANT INSERTION RIGHT DESCEMET'S MEMBRANE ENDOTHELIAL KERATOPLASTY (DMEK) RIGHT     RECORDS REQUESTED FROM:       Primary Care Provider: Anjelcia Kapadia MD - [facility]    Most recent EKG+ Tracin23

## 2024-04-22 NOTE — TELEPHONE ENCOUNTER
Called son to schedule pre op evaluation. He is aware of the date and time and knows if they do not attend this appointment the surgery will be canceled     Anna C. Schoenecker on 4/22/2024 at 11:57 AM

## 2024-04-30 ENCOUNTER — PRE VISIT (OUTPATIENT)
Dept: SURGERY | Facility: CLINIC | Age: 89
End: 2024-04-30

## 2024-05-02 NOTE — TELEPHONE ENCOUNTER
LVM for son, surgery was cx due to missing PAC appointment for upcoming surgery     Anna C. Schoenecker on 5/2/2024 at 8:31 AM

## 2024-05-03 ENCOUNTER — TELEPHONE (OUTPATIENT)
Dept: OPHTHALMOLOGY | Facility: CLINIC | Age: 89
End: 2024-05-03
Payer: COMMERCIAL

## 2024-05-03 NOTE — TELEPHONE ENCOUNTER
Patients son reached out to cx surgery. She no longer wants to have this done     Anna C. Schoenecker on 5/3/2024 at 11:35 AM

## 2024-07-09 ENCOUNTER — PATIENT OUTREACH (OUTPATIENT)
Dept: GERIATRIC MEDICINE | Facility: CLINIC | Age: 89
End: 2024-07-09
Payer: COMMERCIAL

## 2024-07-09 NOTE — PROGRESS NOTES
[facility] Care Coordination Contact      [facility] Six-Month Telephone Assessment    6 month telephone assessment completed on 7/9/24.    ER visits: No  Hospitalizations: No  TCU stays: No  Significant health status changes: No  Falls/Injuries: No  ADL/IADL changes: No  Changes in services: No    Caregiver Assessment follow up:  N/A. Has PCA.     Goals: See POC in chart for goal progress documentation.      Will see member in 6 months for an annual health risk assessment.   Encouraged member to call CC with any questions or concerns in the meantime.     Tello Ferris RN BSN PHN  [facility] Care Coordinator  [phone]  Fax:[phone]

## 2024-07-22 DIAGNOSIS — I10 ESSENTIAL (PRIMARY) HYPERTENSION: ICD-10-CM

## 2024-07-22 DIAGNOSIS — K21.9 GASTRO-ESOPHAGEAL REFLUX DISEASE WITHOUT ESOPHAGITIS: ICD-10-CM

## 2024-07-22 RX ORDER — AMLODIPINE BESYLATE 5 MG/1
TABLET ORAL
Qty: 90 TABLET | Refills: 0 | Status: SHIPPED | OUTPATIENT
Start: 2024-07-22

## 2024-08-11 ENCOUNTER — PATIENT OUTREACH (OUTPATIENT)
Dept: CARE COORDINATION | Facility: CLINIC | Age: 89
End: 2024-08-11
Payer: COMMERCIAL

## 2024-09-03 ENCOUNTER — APPOINTMENT (OUTPATIENT)
Dept: GENERAL RADIOLOGY | Facility: CLINIC | Age: 89
End: 2024-09-03
Attending: FAMILY MEDICINE
Payer: COMMERCIAL

## 2024-09-03 ENCOUNTER — HOSPITAL ENCOUNTER (EMERGENCY)
Facility: CLINIC | Age: 89
Discharge: HOME | End: 2024-09-03
Attending: FAMILY MEDICINE | Admitting: FAMILY MEDICINE
Payer: COMMERCIAL

## 2024-09-03 VITALS
HEART RATE: 83 BPM | DIASTOLIC BLOOD PRESSURE: 81 MMHG | OXYGEN SATURATION: 98 % | TEMPERATURE: 98.4 F | SYSTOLIC BLOOD PRESSURE: 133 MMHG | RESPIRATION RATE: 17 BRPM
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
## 2024-09-03 DIAGNOSIS — R07.89 RIGHT-SIDED CHEST WALL PAIN: ICD-10-CM

## 2024-09-03 DIAGNOSIS — M25.551 RIGHT HIP PAIN: ICD-10-CM

## 2024-09-03 PROCEDURE — 250N000013 HC RX MED GY IP 250 OP 250 PS 637: Performed by: FAMILY MEDICINE

## 2024-09-03 PROCEDURE — 73590 X-RAY EXAM OF LOWER LEG: CPT | Mod: RT

## 2024-09-03 PROCEDURE — 99284 EMERGENCY DEPT VISIT MOD MDM: CPT | Mod: 25 | Performed by: FAMILY MEDICINE

## 2024-09-03 PROCEDURE — 73502 X-RAY EXAM HIP UNI 2-3 VIEWS: CPT

## 2024-09-03 PROCEDURE — 99284 EMERGENCY DEPT VISIT MOD MDM: CPT | Performed by: FAMILY MEDICINE

## 2024-09-03 PROCEDURE — 71046 X-RAY EXAM CHEST 2 VIEWS: CPT

## 2024-09-03 RX ORDER — ACETAMINOPHEN 500 MG
500-1000 TABLET ORAL EVERY 6 HOURS PRN
Qty: 30 TABLET | Refills: 0 | Status: SHIPPED | OUTPATIENT
Start: 2024-09-03 | End: 2024-09-10

## 2024-09-03 RX ORDER — ACETAMINOPHEN 500 MG
1000 TABLET ORAL ONCE
Status: COMPLETED | OUTPATIENT
Start: 2024-09-03 | End: 2024-09-03

## 2024-09-03 RX ADMIN — ACETAMINOPHEN 1000 MG: 500 TABLET ORAL at 10:09

## 2024-09-03 ASSESSMENT — COLUMBIA-SUICIDE SEVERITY RATING SCALE - C-SSRS
6. HAVE YOU EVER DONE ANYTHING, STARTED TO DO ANYTHING, OR PREPARED TO DO ANYTHING TO END YOUR LIFE?: NO
1. IN THE PAST MONTH, HAVE YOU WISHED YOU WERE DEAD OR WISHED YOU COULD GO TO SLEEP AND NOT WAKE UP?: NO
2. HAVE YOU ACTUALLY HAD ANY THOUGHTS OF KILLING YOURSELF IN THE PAST MONTH?: NO

## 2024-09-03 ASSESSMENT — ACTIVITIES OF DAILY LIVING (ADL)
ADLS_ACUITY_SCORE: 38
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
## 2024-09-03 NOTE — DISCHARGE INSTRUCTIONS
Thank you for choosing [facility].     Please closely monitor for further symptoms. Return to the Emergency Department if you develop any new or worsening signs or symptoms.    If you received any opiate pain medications or sedatives during your visit, please do not drive for at least 8 hours.     Labs, cultures or final xray interpretations may still need to be reviewed.  We will call you if your plan of care needs to be changed.    Your x-rays did not show any evidence of life-threatening acute traumatic injuries.  You have some chronic findings in your lungs and in your bones which are not related to your recent fall.  Use heating pad or ice to the affected painful areas, may rub diclofenac gel directly into the painful areas and may also take Tylenol at the same time.  Expect your symptoms to gradually improve over the next 7 to 10 days, if they are worsening come back to the emergency room for further evaluation, otherwise please follow up with your primary care physician or clinic.
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
## 2024-09-04 ENCOUNTER — PATIENT OUTREACH (OUTPATIENT)
Dept: GERIATRIC MEDICINE | Facility: CLINIC | Age: 89
End: 2024-09-04
Payer: COMMERCIAL

## 2024-09-04 NOTE — PROGRESS NOTES
[facility] Care Coordination Contact  CC received notification of Emergency Room visit.  ER visit occurred on 9/3/24 at [facility] with Dx of fall.    CC contacted adult son Ariel Raymundo  and reviewed discharge summary.  Member has a follow-up appointment with PCP: Yes: scheduled on 9/16/24.   Member has had a change in condition: No  New referrals placed: No  Home Visit Needed: No  Support Plan reviewed and updated.  PCP notified of ED visit via EMR.    Tello Ferris RN BSN PHN  [facility] Care Coordinator  [phone]  Fax:[phone]

## 2024-09-16 ENCOUNTER — ANCILLARY PROCEDURE (OUTPATIENT)
Dept: GENERAL RADIOLOGY | Facility: CLINIC | Age: 89
End: 2024-09-16
Attending: INTERNAL MEDICINE
Payer: COMMERCIAL

## 2024-09-16 ENCOUNTER — OFFICE VISIT (OUTPATIENT)
Dept: INTERNAL MEDICINE | Facility: CLINIC | Age: 89
End: 2024-09-16
Payer: COMMERCIAL

## 2024-09-16 VITALS
HEIGHT: 65 IN | BODY MASS INDEX: 23.59 KG/M2 | HEART RATE: 80 BPM | SYSTOLIC BLOOD PRESSURE: 115 MMHG | RESPIRATION RATE: 18 BRPM | DIASTOLIC BLOOD PRESSURE: 70 MMHG | WEIGHT: 141.6 LBS | TEMPERATURE: 98.6 F | OXYGEN SATURATION: 98 %

## 2024-09-16 DIAGNOSIS — M89.8X8 ILIAC CREST BONE PAIN: ICD-10-CM

## 2024-09-16 DIAGNOSIS — M54.41 ACUTE BILATERAL LOW BACK PAIN WITH RIGHT-SIDED SCIATICA: ICD-10-CM

## 2024-09-16 DIAGNOSIS — R30.0 DYSURIA: ICD-10-CM
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
## 2024-09-16 DIAGNOSIS — R93.89 ABNORMAL CHEST X-RAY: ICD-10-CM
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
## 2024-09-16 DIAGNOSIS — E03.9 HYPOTHYROIDISM, UNSPECIFIED TYPE: ICD-10-CM

## 2024-09-16 LAB
ALBUMIN UR-MCNC: NEGATIVE MG/DL
APPEARANCE UR: CLEAR
BACTERIA #/AREA URNS HPF: ABNORMAL /HPF
BILIRUB UR QL STRIP: NEGATIVE
COLOR UR AUTO: YELLOW
GLUCOSE UR STRIP-MCNC: NEGATIVE MG/DL
HGB UR QL STRIP: ABNORMAL
HYALINE CASTS #/AREA URNS LPF: ABNORMAL /LPF
KETONES UR STRIP-MCNC: NEGATIVE MG/DL
LEUKOCYTE ESTERASE UR QL STRIP: ABNORMAL
MUCOUS THREADS #/AREA URNS LPF: ABNORMAL /LPF
NITRATE UR QL: NEGATIVE
PH UR STRIP: 6 [PH] (ref 5–8)
RBC #/AREA URNS AUTO: ABNORMAL /HPF
SP GR UR STRIP: 1.01 (ref 1–1.03)
SQUAMOUS #/AREA URNS AUTO: ABNORMAL /LPF
UROBILINOGEN UR STRIP-ACNC: 0.2 E.U./DL
WBC #/AREA URNS AUTO: ABNORMAL /HPF
WBC CLUMPS #/AREA URNS HPF: ABNORMAL /HPF

## 2024-09-16 PROCEDURE — 71046 X-RAY EXAM CHEST 2 VIEWS: CPT | Mod: TC | Performed by: RADIOLOGY

## 2024-09-16 PROCEDURE — 99214 OFFICE O/P EST MOD 30 MIN: CPT | Performed by: INTERNAL MEDICINE

## 2024-09-16 PROCEDURE — 72100 X-RAY EXAM L-S SPINE 2/3 VWS: CPT | Mod: TC | Performed by: RADIOLOGY

## 2024-09-16 PROCEDURE — 73502 X-RAY EXAM HIP UNI 2-3 VIEWS: CPT | Mod: TC | Performed by: RADIOLOGY

## 2024-09-16 PROCEDURE — 81001 URINALYSIS AUTO W/SCOPE: CPT | Performed by: INTERNAL MEDICINE

## 2024-09-16 RX ORDER — LEVOTHYROXINE SODIUM 25 UG/1
TABLET ORAL
Qty: 90 TABLET | Refills: 1 | Status: SHIPPED | OUTPATIENT
Start: 2024-09-16

## 2024-09-16 RX ORDER — ACETAMINOPHEN 500 MG
1000 TABLET ORAL 3 TIMES DAILY
Qty: 180 TABLET | Refills: 1 | Status: SHIPPED | OUTPATIENT
Start: 2024-09-16

## 2024-09-16 RX ORDER — TRAMADOL HYDROCHLORIDE 50 MG/1
50 TABLET ORAL AT BEDTIME
Qty: 10 TABLET | Refills: 0 | Status: SHIPPED | OUTPATIENT
Start: 2024-09-16

## 2024-09-16 ASSESSMENT — PAIN SCALES - GENERAL: PAINLEVEL: EXTREME PAIN (9)

## 2024-09-16 NOTE — PATIENT INSTRUCTIONS
Use walker at home.    2. For pain: can use Tylenol 1000 mg 3 times a day. Tramadol pain pill is stronger then tylenol and can cause sleepiness: take 1/2 to 1 pill at bed time. If it is causing constipation, start Miralax powder over the counter dissolved in water to help with bowels.     3. Repeat XR    4. Check urine    5. Restart Thyroid medication.
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
## 2024-09-17 ENCOUNTER — TELEPHONE (OUTPATIENT)
Dept: INTERNAL MEDICINE | Facility: CLINIC | Age: 89
End: 2024-09-17
Payer: COMMERCIAL

## 2024-09-17 DIAGNOSIS — M54.41 ACUTE BILATERAL LOW BACK PAIN WITH RIGHT-SIDED SCIATICA: Primary | ICD-10-CM

## 2024-09-17 NOTE — TELEPHONE ENCOUNTER
traMADol (ULTRAM) 50 MG tablet 10 tablet 0 9/16/2024 -- No   Sig - Route: Take 1 tablet (50 mg) by mouth at bedtime. - Oral   Sent to pharmacy as: traMADol HCl 50 MG Oral Tablet (ULTRAM)     Pharmacy requesting alternative medication or a PA - please advise

## 2024-09-18 ENCOUNTER — TELEPHONE (OUTPATIENT)
Dept: INTERNAL MEDICINE | Facility: CLINIC | Age: 89
End: 2024-09-18
Payer: COMMERCIAL

## 2024-09-18 RX ORDER — GABAPENTIN 100 MG/1
CAPSULE ORAL
Qty: 60 CAPSULE | Refills: 1 | Status: SHIPPED | OUTPATIENT
Start: 2024-09-18
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
## 2024-09-18 NOTE — TELEPHONE ENCOUNTER
September 18, 2024    [facility]  was picked up from outbox of Dr. Kapadia and placed at the  for pickup.    Pt was notified    Christianne Cheng

## 2024-09-18 NOTE — TELEPHONE ENCOUNTER
Spoke with patient's son Said (CTC on file) and relayed information below from Dr Kapadia. Said verbalized understanding and has no further questions.

## 2024-10-19 DIAGNOSIS — I10 ESSENTIAL (PRIMARY) HYPERTENSION: ICD-10-CM

## 2024-10-19 DIAGNOSIS — K21.9 GASTRO-ESOPHAGEAL REFLUX DISEASE WITHOUT ESOPHAGITIS: ICD-10-CM

## 2024-10-21 RX ORDER — AMLODIPINE BESYLATE 5 MG/1
TABLET ORAL
Qty: 90 TABLET | Refills: 2 | Status: SHIPPED | OUTPATIENT
Start: 2024-10-21

## 2024-12-03 ENCOUNTER — PATIENT OUTREACH (OUTPATIENT)
Dept: GERIATRIC MEDICINE | Facility: CLINIC | Age: 89
End: 2024-12-03
Payer: COMMERCIAL

## 2024-12-03 NOTE — PROGRESS NOTES
[facility] Care Coordination Contact    Called adult son Said  to schedule annual HRA home visit. HRA has been scheduled for 12/5/24.    Tello Ferris RN BSN PHN  [facility] Care Coordinator  [phone]  Fax:[phone]

## 2024-12-05 ENCOUNTER — PATIENT OUTREACH (OUTPATIENT)
Dept: GERIATRIC MEDICINE | Facility: CLINIC | Age: 89
End: 2024-12-05
Payer: COMMERCIAL

## 2024-12-05 NOTE — PROGRESS NOTES
[facility] Care Coordination Contact    [facility] Home Visit Assessment     Home visit for Health Risk Assessment with Radha Mcmanus completed on December 5, 2024    Type of residence:: Apartment  Current living arrangement:: I live alone     Assessment completed with:: Patient    Current Care Plan  Member currently receiving the following home care services:     Member currently receiving the following community resources: PCA, County Programs, Day Care      Medication Review  Medication reconciliation completed in Epic: Yes  Medication set-up & administration: Family/informal caregiver sets up daily.  Family caregiver administers medications.  Medication Risk Assessment Medication (1 or more, place referral to MTM): Lacks understanding of medication regimen and Taking 1 or more high-risk medications for adults >65 years  MTM Referral Placed: No:      Mental/Behavioral Health   Depression Screening:   PHQ-2 Total Score (Adult) - Positive if 3 or more points; Administer PHQ-9 if positive: 0       Mental health DX:: No        Falls Assessment:   Fallen 2 or more times in the past year?: No   Any fall with injury in the past year?: No    ADL/IADL Dependencies:   Dependent ADLs:: Ambulation-cane, Bathing, Dressing, Eating, Grooming, Toileting, Transfers  Dependent IADLs:: Cleaning, Cooking, Laundry, Shopping, Meal Preparation, Medication Management, Money Management, Transportation    Health Plan sponsored benefits: [facility]: Shared information regarding [employer]. Reviewed preventative health screening and health plan supplemental benefits/incentives. Reviewed medication disposal form.    PCA Assessment completed at visit: Yes Annual PCA assessment indicated 6.75 hours per day of PCA. This is the same as the previous assessment.     Elderly Waiver Eligibility: Yes-will continue on EW.     Care Plan & Recommendations: No changes to PCA hours. Continue adult day care program.     See  [facility] Assessment for detailed assessment information.    Follow-Up Plan: Member informed of future contact, plan to f/u with member with a 6 month telephone assessment.  Contact information shared with member and family, encouraged member to call with any questions or concerns at any time.    [facility] [facility] care continuum providers: Please see Snapshot and Care Management Flowsheets for Specific details of care plan.    This CC note routed to PCP, Anjelica Kapadia.    Tello Ferris RN BSN PHN  [facility] Care Coordinator  [phone]  Fax:[phone]

## 2024-12-12 ENCOUNTER — PATIENT OUTREACH (OUTPATIENT)
Dept: GERIATRIC MEDICINE | Facility: CLINIC | Age: 89
End: 2024-12-12

## 2024-12-12 ENCOUNTER — APPOINTMENT (OUTPATIENT)
Dept: CT IMAGING | Facility: CLINIC | Age: 89
End: 2024-12-12
Attending: EMERGENCY MEDICINE
Payer: COMMERCIAL

## 2024-12-12 ENCOUNTER — HOSPITAL ENCOUNTER (EMERGENCY)
Facility: CLINIC | Age: 89
Discharge: HOME | End: 2024-12-12
Attending: EMERGENCY MEDICINE
Payer: COMMERCIAL

## 2024-12-12 VITALS
HEART RATE: 86 BPM | DIASTOLIC BLOOD PRESSURE: 86 MMHG | RESPIRATION RATE: 16 BRPM | OXYGEN SATURATION: 98 % | SYSTOLIC BLOOD PRESSURE: 136 MMHG | TEMPERATURE: 98 F

## 2024-12-12 DIAGNOSIS — R11.2 NAUSEA AND VOMITING, UNSPECIFIED VOMITING TYPE: ICD-10-CM

## 2024-12-12 LAB
ALBUMIN SERPL BCG-MCNC: 4.2 G/DL (ref 3.5–5.2)
ALP SERPL-CCNC: 81 U/L (ref 40–150)
ALT SERPL W P-5'-P-CCNC: 14 U/L (ref 0–50)
ANION GAP SERPL CALCULATED.3IONS-SCNC: 12 MMOL/L (ref 7–15)
AST SERPL W P-5'-P-CCNC: 21 U/L (ref 0–45)
ATRIAL RATE - MUSE: 90 BPM
BASOPHILS # BLD AUTO: 0 10E3/UL (ref 0–0.2)
BASOPHILS NFR BLD AUTO: 0 %
BILIRUB SERPL-MCNC: 0.6 MG/DL
BUN SERPL-MCNC: 10.1 MG/DL (ref 8–23)
CALCIUM SERPL-MCNC: 9.9 MG/DL (ref 8.8–10.4)
CHLORIDE SERPL-SCNC: 98 MMOL/L (ref 98–107)
CREAT SERPL-MCNC: 0.65 MG/DL (ref 0.51–0.95)
DIASTOLIC BLOOD PRESSURE - MUSE: NORMAL MMHG
EGFRCR SERPLBLD CKD-EPI 2021: 82 ML/MIN/1.73M2
EOSINOPHIL # BLD AUTO: 0 10E3/UL (ref 0–0.7)
EOSINOPHIL NFR BLD AUTO: 1 %
ERYTHROCYTE [DISTWIDTH] IN BLOOD BY AUTOMATED COUNT: 13.2 % (ref 10–15)
FLUAV RNA SPEC QL NAA+PROBE: NEGATIVE
FLUBV RNA RESP QL NAA+PROBE: NEGATIVE
GLUCOSE SERPL-MCNC: 100 MG/DL (ref 70–99)
HCO3 SERPL-SCNC: 29 MMOL/L (ref 22–29)
HCT VFR BLD AUTO: 39.7 % (ref 35–47)
HGB BLD-MCNC: 14 G/DL (ref 11.7–15.7)
IMM GRANULOCYTES # BLD: 0 10E3/UL
IMM GRANULOCYTES NFR BLD: 0 %
INTERPRETATION ECG - MUSE: NORMAL
LACTATE SERPL-SCNC: 1.1 MMOL/L (ref 0.7–2)
LIPASE SERPL-CCNC: 30 U/L (ref 13–60)
LYMPHOCYTES # BLD AUTO: 1.1 10E3/UL (ref 0.8–5.3)
LYMPHOCYTES NFR BLD AUTO: 20 %
MCH RBC QN AUTO: 30.6 PG (ref 26.5–33)
MCHC RBC AUTO-ENTMCNC: 35.3 G/DL (ref 31.5–36.5)
MCV RBC AUTO: 87 FL (ref 78–100)
MONOCYTES # BLD AUTO: 0.3 10E3/UL (ref 0–1.3)
MONOCYTES NFR BLD AUTO: 5 %
NEUTROPHILS # BLD AUTO: 4.1 10E3/UL (ref 1.6–8.3)
NEUTROPHILS NFR BLD AUTO: 74 %
NRBC # BLD AUTO: 0 10E3/UL
NRBC BLD AUTO-RTO: 0 /100
P AXIS - MUSE: 56 DEGREES
PLATELET # BLD AUTO: 180 10E3/UL (ref 150–450)
POTASSIUM SERPL-SCNC: 3.2 MMOL/L (ref 3.4–5.3)
PR INTERVAL - MUSE: 188 MS
PROT SERPL-MCNC: 8.5 G/DL (ref 6.4–8.3)
QRS DURATION - MUSE: 86 MS
QT - MUSE: 380 MS
QTC - MUSE: 464 MS
R AXIS - MUSE: -50 DEGREES
RBC # BLD AUTO: 4.57 10E6/UL (ref 3.8–5.2)
RSV RNA SPEC NAA+PROBE: NEGATIVE
SARS-COV-2 RNA RESP QL NAA+PROBE: NEGATIVE
SODIUM SERPL-SCNC: 139 MMOL/L (ref 135–145)
SYSTOLIC BLOOD PRESSURE - MUSE: NORMAL MMHG
T AXIS - MUSE: 2 DEGREES
TROPONIN T SERPL HS-MCNC: 8 NG/L
VENTRICULAR RATE- MUSE: 90 BPM
WBC # BLD AUTO: 5.5 10E3/UL (ref 4–11)

## 2024-12-12 PROCEDURE — 93005 ELECTROCARDIOGRAM TRACING: CPT | Performed by: EMERGENCY MEDICINE

## 2024-12-12 PROCEDURE — 250N000013 HC RX MED GY IP 250 OP 250 PS 637: Performed by: EMERGENCY MEDICINE

## 2024-12-12 PROCEDURE — 93010 ELECTROCARDIOGRAM REPORT: CPT | Performed by: EMERGENCY MEDICINE

## 2024-12-12 PROCEDURE — 70450 CT HEAD/BRAIN W/O DYE: CPT

## 2024-12-12 PROCEDURE — 85004 AUTOMATED DIFF WBC COUNT: CPT | Performed by: EMERGENCY MEDICINE

## 2024-12-12 PROCEDURE — 999N000285 HC STATISTIC VASC ACCESS LAB DRAW WITH PIV START

## 2024-12-12 PROCEDURE — 82247 BILIRUBIN TOTAL: CPT | Performed by: EMERGENCY MEDICINE

## 2024-12-12 PROCEDURE — 258N000003 HC RX IP 258 OP 636: Performed by: EMERGENCY MEDICINE

## 2024-12-12 PROCEDURE — 84155 ASSAY OF PROTEIN SERUM: CPT | Performed by: EMERGENCY MEDICINE

## 2024-12-12 PROCEDURE — 83605 ASSAY OF LACTIC ACID: CPT | Performed by: EMERGENCY MEDICINE

## 2024-12-12 PROCEDURE — 999N000040 HC STATISTIC CONSULT NO CHARGE VASC ACCESS

## 2024-12-12 PROCEDURE — 84460 ALANINE AMINO (ALT) (SGPT): CPT | Performed by: EMERGENCY MEDICINE

## 2024-12-12 PROCEDURE — 74176 CT ABD & PELVIS W/O CONTRAST: CPT

## 2024-12-12 PROCEDURE — 99284 EMERGENCY DEPT VISIT MOD MDM: CPT | Performed by: EMERGENCY MEDICINE

## 2024-12-12 PROCEDURE — 82435 ASSAY OF BLOOD CHLORIDE: CPT | Performed by: EMERGENCY MEDICINE

## 2024-12-12 PROCEDURE — 96361 HYDRATE IV INFUSION ADD-ON: CPT | Performed by: EMERGENCY MEDICINE

## 2024-12-12 PROCEDURE — 83690 ASSAY OF LIPASE: CPT | Performed by: EMERGENCY MEDICINE

## 2024-12-12 PROCEDURE — 250N000011 HC RX IP 250 OP 636: Performed by: EMERGENCY MEDICINE

## 2024-12-12 PROCEDURE — 999N000127 HC STATISTIC PERIPHERAL IV START W US GUIDANCE

## 2024-12-12 PROCEDURE — 84484 ASSAY OF TROPONIN QUANT: CPT | Performed by: EMERGENCY MEDICINE

## 2024-12-12 PROCEDURE — 96375 TX/PRO/DX INJ NEW DRUG ADDON: CPT | Performed by: EMERGENCY MEDICINE

## 2024-12-12 PROCEDURE — 85041 AUTOMATED RBC COUNT: CPT | Performed by: EMERGENCY MEDICINE

## 2024-12-12 PROCEDURE — 87637 SARSCOV2&INF A&B&RSV AMP PRB: CPT | Performed by: EMERGENCY MEDICINE

## 2024-12-12 PROCEDURE — 99285 EMERGENCY DEPT VISIT HI MDM: CPT | Mod: 25 | Performed by: EMERGENCY MEDICINE

## 2024-12-12 PROCEDURE — 96374 THER/PROPH/DIAG INJ IV PUSH: CPT | Performed by: EMERGENCY MEDICINE

## 2024-12-12 RX ORDER — ACETAMINOPHEN 500 MG
1000 TABLET ORAL ONCE
Status: COMPLETED | OUTPATIENT
Start: 2024-12-12 | End: 2024-12-12

## 2024-12-12 RX ORDER — ONDANSETRON 4 MG/1
4 TABLET, ORALLY DISINTEGRATING ORAL EVERY 8 HOURS PRN
Qty: 10 TABLET | Refills: 0 | Status: SHIPPED | OUTPATIENT
Start: 2024-12-12 | End: 2024-12-15

## 2024-12-12 RX ORDER — KETOROLAC TROMETHAMINE 15 MG/ML
15 INJECTION, SOLUTION INTRAMUSCULAR; INTRAVENOUS ONCE
Status: COMPLETED | OUTPATIENT
Start: 2024-12-12 | End: 2024-12-12

## 2024-12-12 RX ORDER — ONDANSETRON 2 MG/ML
4 INJECTION INTRAMUSCULAR; INTRAVENOUS ONCE
Status: COMPLETED | OUTPATIENT
Start: 2024-12-12 | End: 2024-12-12

## 2024-12-12 RX ADMIN — KETOROLAC TROMETHAMINE 15 MG: 15 INJECTION, SOLUTION INTRAMUSCULAR; INTRAVENOUS at 13:41

## 2024-12-12 RX ADMIN — ACETAMINOPHEN 1000 MG: 500 TABLET ORAL at 15:10

## 2024-12-12 RX ADMIN — SODIUM CHLORIDE 500 ML: 9 INJECTION, SOLUTION INTRAVENOUS at 13:38

## 2024-12-12 RX ADMIN — ONDANSETRON 4 MG: 2 INJECTION INTRAMUSCULAR; INTRAVENOUS at 13:40

## 2024-12-12 ASSESSMENT — ACTIVITIES OF DAILY LIVING (ADL)
ADLS_ACUITY_SCORE: 42

## 2024-12-12 NOTE — PROGRESS NOTES
[facility] Care Coordination Contact  CC received notification of Emergency Room visit.  ER visit occurred on 12/12/24 at [facility] with Dx of N&V, dizziness.    CC contacted adult son Said  and reviewed discharge summary.  Member has a follow-up appointment with PCP: No: Offered Assistance with setting up a follow up appointment  Member has had a change in condition: No  New referrals placed: No  Home Visit Needed: No  Support Plan reviewed and updated.  PCP notified of ED visit via EMR.    Tello Ferris RN BSN PHN  [facility] Care Coordinator  [phone]  Fax:[phone]

## 2024-12-12 NOTE — ED TRIAGE NOTES
Vomiting, dizziness and weakness that started last night. Pt's son states she had a fever. She has been vomiting everything that she eats and is unable to keep her blood pressure medicine down.

## 2024-12-12 NOTE — CONSULTS
"Consult received for Vascular access care.  See LDA for details. For additional needs place \"Nursing to Consult for Vascular Access\" [id number] order in EPIC.   "

## 2024-12-12 NOTE — ED PROVIDER NOTES
[facility] [place], [place] EMERGENCY DEPARTMENT ([facility], [place])    12/12/24      ED PROVIDER NOTE   History     Chief Complaint   Patient presents with    Nausea & Vomiting    Dizziness     The history is provided by the patient and medical records. A  was used (Salvadorean).     Radha Mcmanus is a 89 year old female with a past medical history of HTN, GERD, and vertigo who presents to the emergency department for vomiting, dizziness, and generalized weakness.  Started at 7 PM yesterday.  No diarrhea.  Has abdominal cramping diffusely.  No chest pain or shortness of breath.  The patient denies any falls or injuries.  No blood in the vomit, just water and food.  No dysuria or hematuria.  Patient is having normal bowel movements.  Also has a headache.  They have not tried any medications.  Subjective fever at home.  No leg pain or swelling.  Loss of sensation or muscle weakness.    Past Medical History  Past Medical History:   Diagnosis Date    Hypertension     Ulcer      Past Surgical History:   Procedure Laterality Date    ORTHOPEDIC SURGERY       acetaminophen (TYLENOL) 500 MG tablet  alendronate (FOSAMAX) 70 MG tablet  amLODIPine (NORVASC) 5 MG tablet  aspirin (ASA) 81 MG EC tablet  Calcium Citrate-Vitamin D 250-200 MG-UNIT TABS  diclofenac (VOLTAREN) 1 % topical gel  fluticasone (FLONASE) 50 MCG/ACT nasal spray  gabapentin (NEURONTIN) 100 MG capsule  levothyroxine (SYNTHROID/LEVOTHROID) 25 MCG tablet  meclizine (ANTIVERT) 25 MG tablet  olopatadine (PATADAY) 0.2 % ophthalmic solution  omeprazole (PRILOSEC) 20 MG DR capsule  ORDER FOR DME  polyethylene glycol (MIRALAX) 17 g packet  traMADol (ULTRAM) 50 MG tablet      Allergies   Allergen Reactions    Contrast Dye     Erythromycin      Family History  Family History   Problem Relation Age of Onset    No Known Problems Mother     No Known Problems Father     Glaucoma No family hx of     Macular Degeneration No family hx of      Social History   Social History      Tobacco Use    Smoking status: Never    Smokeless tobacco: Never   Vaping Use    Vaping status: Never Used   Substance Use Topics    Alcohol use: Never    Drug use: Never      Past medical history, past surgical history, medications, allergies, family history, and social history were reviewed with the patient. No additional pertinent items.   A medically appropriate review of systems was performed with pertinent positives and negatives noted in the HPI, and all other systems negative.    Physical Exam      Physical Exam  Physical Exam   Constitutional: oriented to person, place, and time. appears well-developed and well-nourished.   HENT:   Head: Normocephalic and atraumatic.   Neck: Normal range of motion.   Pulmonary/Chest: Effort normal. No respiratory distress.  Clear lungs bilaterally.  Cardiac: No murmurs, rubs, gallops. RRR.  Abdominal: Abdomen soft, nontender, nondistended. No rebound tenderness.  MSK: Long bones without deformity or evidence of trauma  Neurological: alert and oriented to person, place, and time.  Moving all extremities.  No nuchal rigidity.  Stable gait.  Skin: Skin is warm and dry.   Psychiatric:  normal mood and affect.  behavior is normal. Thought content normal.      ED Course, Procedures, & Data      Procedures            EKG Interpretation:      Interpreted by Bucky Aviles MD  Time reviewed: 1135  Symptoms at time of EKG: Abdominal pain, vomiting  Rhythm: normal sinus   Rate: Normal  Axis: Left Axis Deviation  Ectopy: none  Conduction: normal  ST Segments/ T Waves: T wave inversions in the precordial leads similar to prior EKG.  Q Waves: none  Comparison to prior: Unchanged    Clinical Impression: No acute changes from prior suggesting ischemia or infarction.             Results for orders placed or performed during the hospital encounter of 12/12/24   CT Head w/o Contrast     Status: None    Narrative    CT SCAN OF THE HEAD WITHOUT CONTRAST   12/12/2024 2:10 PM     HISTORY:  Headache, vomiting, new onset    TECHNIQUE:  Axial images of the head and coronal reformations without  IV contrast material. Radiation dose for this scan was reduced using  automated exposure control, adjustment of the mA and/or kV according  to patient size, or iterative reconstruction technique.    COMPARISON: 1/14/2024 MRI.    FINDINGS: There is no evidence of intracranial hemorrhage, mass, acute  infarct or anomaly. The ventricles are normal in size, shape and  configuration. Moderate diffuse parenchymal volume loss. Confluent  periventricular white matter hypodensities which are nonspecific, but  likely related to advanced chronic microvascular ischemic disease.     The visualized portions of the sinuses and mastoids appear normal. The  bony calvarium and bones of the skull base appear intact.       Impression    IMPRESSION:     1. No evidence of acute intracranial hemorrhage, mass, or herniation.  2. Diffuse parenchymal volume loss and white matter changes likely due  to chronic microvascular ischemic change.      ANNA TAY MD         SYSTEM ID:  [id number]   CT Abdomen Pelvis w/o Contrast     Status: None    Narrative    CT ABDOMEN & PELVIS WITHOUT CONTRAST December 12, 2024 2:07 PM    CLINICAL HISTORY: Abdominal pain, vomiting.    TECHNIQUE: CT scan of the abdomen and pelvis was performed without IV  contrast. Multiplanar reformats were obtained. Dose reduction  techniques were used.  CONTRAST: None.    COMPARISON: CT abdomen and pelvis 11/30/2014, lumbar spine radiographs  2/13/2023.    FINDINGS:   LOWER CHEST: Interstitial thickening at the lung bases appears  slightly increased compared to 2014. Stable nodularity at the lingula.    HEPATOBILIARY: Normal.    PANCREAS: Normal.    SPLEEN: Normal.    ADRENAL GLANDS: Normal.    KIDNEYS/BLADDER: No significant mass,  or hydronephrosis. There are  simple or benign cysts. No follow up is needed. Nonobstructing stone  lower left kidney is 3 mm series 6 image  51. No acute bladder  abnormality. Right posterior bladder diverticulum again noted.    BOWEL: There is no bowel obstruction. A few colonic diverticula  without diverticulitis. Normal appendix.    LYMPH NODES: Normal.    VASCULATURE: Mild vascular calcifications.    PELVIC ORGANS: Normal.    MUSCULOSKELETAL: Compression deformities at L2 and L3, sagittal series  5 image 55. The L2 compression is new since 2/13/2023. Likely stable  L3 compression deformity.      Impression    IMPRESSION:   1.  L2 compression deformity is new since 2/13/2023. Compression  deformity at L3 is likely stable.  2.  No bowel obstruction or other acute abnormality identified.  3.  Small nonobstructing stone at the left kidney.    BARB LITTLE MD         SYSTEM ID:  [id number]   Comprehensive metabolic panel     Status: Abnormal   Result Value Ref Range    Sodium 139 135 - 145 mmol/L    Potassium 3.2 (L) 3.4 - 5.3 mmol/L    Carbon Dioxide (CO2) 29 22 - 29 mmol/L    Anion Gap 12 7 - 15 mmol/L    Urea Nitrogen 10.1 8.0 - 23.0 mg/dL    Creatinine 0.65 0.51 - 0.95 mg/dL    GFR Estimate 82 >60 mL/min/1.73m2    Calcium 9.9 8.8 - 10.4 mg/dL    Chloride 98 98 - 107 mmol/L    Glucose 100 (H) 70 - 99 mg/dL    Alkaline Phosphatase 81 40 - 150 U/L    AST 21 0 - 45 U/L    ALT 14 0 - 50 U/L    Protein Total 8.5 (H) 6.4 - 8.3 g/dL    Albumin 4.2 3.5 - 5.2 g/dL    Bilirubin Total 0.6 <=1.2 mg/dL   Lipase     Status: Normal   Result Value Ref Range    Lipase 30 13 - 60 U/L   Lactic acid whole blood with 1x repeat in 2 hr when >2     Status: Normal   Result Value Ref Range    Lactic Acid, Initial 1.1 0.7 - 2.0 mmol/L   Influenza A/B, RSV and SARS-CoV2 PCR (COVID-19) Nose     Status: Normal    Specimen: Nose; Swab   Result Value Ref Range    Influenza A PCR Negative Negative    Influenza B PCR Negative Negative    RSV PCR Negative Negative    SARS CoV2 PCR Negative Negative    Narrative    Testing was performed using the Xpert Xpress CoV2/Flu/RSV Assay on the  [employer] GeneXpert Instrument. This test should be ordered for the detection of SARS-CoV2, influenza, and RSV viruses in individuals with signs and symptoms of respiratory tract infection. This test is for in vitro diagnostic use under the [place] FDA for laboratories certified under CLIA to perform high or moderate complexity testing. This test has been [place] FDA cleared. A negative result does not rule out the presence of PCR inhibitors in the specimen or target RNA in concentration below the limit of detection for the assay. If only one viral target is positive but coinfection with multiple targets is suspected, the sample should be re-tested with another FDA cleared, approved, or authorized test, if coninfection would change clinical management. This test was validated by the [facility]. These laboratories are certified under the Clinical Laboratory Improvement Amendments of 1988 (CLIA-88) as qualified to perfom high complexity laboratory testing.   Troponin T, High Sensitivity     Status: Normal   Result Value Ref Range    Troponin T, High Sensitivity 8 <=14 ng/L   CBC with platelets and differential     Status: None   Result Value Ref Range    WBC Count 5.5 4.0 - 11.0 10e3/uL    RBC Count 4.57 3.80 - 5.20 10e6/uL    Hemoglobin 14.0 11.7 - 15.7 g/dL    Hematocrit 39.7 35.0 - 47.0 %    MCV 87 78 - 100 fL    MCH 30.6 26.5 - 33.0 pg    MCHC 35.3 31.5 - 36.5 g/dL    RDW 13.2 10.0 - 15.0 %    Platelet Count 180 150 - 450 10e3/uL    % Neutrophils 74 %    % Lymphocytes 20 %    % Monocytes 5 %    % Eosinophils 1 %    % Basophils 0 %    % Immature Granulocytes 0 %    NRBCs per 100 WBC 0 <1 /100    Absolute Neutrophils 4.1 1.6 - 8.3 10e3/uL    Absolute Lymphocytes 1.1 0.8 - 5.3 10e3/uL    Absolute Monocytes 0.3 0.0 - 1.3 10e3/uL    Absolute Eosinophils 0.0 0.0 - 0.7 10e3/uL    Absolute Basophils 0.0 0.0 - 0.2 10e3/uL    Absolute Immature Granulocytes 0.0 <=0.4 10e3/uL    Absolute NRBCs 0.0 10e3/uL   EKG  12-lead, tracing only     Status: None (Preliminary result)   Result Value Ref Range    Systolic Blood Pressure  mmHg    Diastolic Blood Pressure  mmHg    Ventricular Rate 90 BPM    Atrial Rate 90 BPM    OR Interval 188 ms    QRS Duration 86 ms     ms    QTc 464 ms    P Axis 56 degrees    R AXIS -50 degrees    T Axis 2 degrees    Interpretation ECG       Sinus rhythm  Left axis deviation  ST & T wave abnormality, consider anterior ischemia  Abnormal ECG     CBC with platelets differential     Status: None    Narrative    The following orders were created for panel order CBC with platelets differential.  Procedure                               Abnormality         Status                     ---------                               -----------         ------                     CBC with platelets and d...[[id number]]                      Final result                 Please view results for these tests on the individual orders.     Medications - No data to display  Labs Ordered and Resulted from Time of ED Arrival to Time of ED Departure - No data to display  No orders to display          Critical care was not performed.     Medical Decision Making  The patient's presentation was of moderate complexity (an undiagnosed new problem with uncertain prognosis).    The patient's evaluation involved:  strong consideration of a test (brain MRI) that was ultimately deferred  ordering and/or review of 3+ test(s) in this encounter (see separate area of note for details)  independent interpretation of testing performed by another health professional (CT abdomen)    The patient's management necessitated moderate risk (prescription drug management including medications given in the ED).    Assessment & Plan    Patient presenting with vomiting dizziness generalized weakness.  Symptoms have abated here.  She has not an episode of vomiting and is tolerating p.o. intake.  She has benign abdomen.  CT of the abdomen is unremarkable.   There is some mention of compression fractures however she has no tenderness or back pain.  No evidence of meningitis encephalitis on examination.  Labs here are completely normal and she otherwise does appear quite well.  She has a EKG with some T wave inversions which seem to be stable compared to prior EKGs.  Initial troponin is within normal limits.  Symptoms open present for 10 to 12 hours so I do not think a repeat troponin is absolutely necessary.  Discussed this with the patient and family and they would like to be discharged as she is feeling fine at this point.  She would like to follow-up her urine test in clinic.  She does not have any dysuria or other urinary changes.  Zofran sent to their pharmacy and discussed returning if symptoms are worsening.    I have reviewed the nursing notes. I have reviewed the findings, diagnosis, plan and need for follow up with the patient.    New Prescriptions    ONDANSETRON (ZOFRAN ODT) 4 MG ODT TAB    Take 1 tablet (4 mg) by mouth every 8 hours as needed.       Final diagnoses:   Nausea and vomiting, unspecified vomiting type       Bucky Aviles MD  [facility] EMERGENCY DEPARTMENT  12/12/2024     Bucky Aviles MD  12/12/24 154

## 2024-12-12 NOTE — DISCHARGE INSTRUCTIONS
Please follow-up with your primary care provider if you are still having symptoms    If you develop worsening pain, fevers, inability eat or drink return to the emergency department.

## 2024-12-13 LAB
ATRIAL RATE - MUSE: 90 BPM
DIASTOLIC BLOOD PRESSURE - MUSE: NORMAL MMHG
INTERPRETATION ECG - MUSE: NORMAL
P AXIS - MUSE: 56 DEGREES
PR INTERVAL - MUSE: 188 MS
QRS DURATION - MUSE: 86 MS
QT - MUSE: 380 MS
QTC - MUSE: 464 MS
R AXIS - MUSE: -50 DEGREES
SYSTOLIC BLOOD PRESSURE - MUSE: NORMAL MMHG
T AXIS - MUSE: 2 DEGREES
VENTRICULAR RATE- MUSE: 90 BPM

## 2024-12-16 ENCOUNTER — PATIENT OUTREACH (OUTPATIENT)
Dept: GERIATRIC MEDICINE | Facility: CLINIC | Age: 89
End: 2024-12-16
Payer: COMMERCIAL

## 2024-12-16 NOTE — LETTER
December 16, 2024       LASHAY FABIAN  [address]   [place] [address]      Dear Lashay,    At [facility], we re dedicated to improving your health and wellness. Enclosed is the Support Plan developed with you on 12/05/2024. Please review the Support Plan carefully.    As a reminder, during your visit we talked about:   Ways to manage your physical and mental health   Using health care to maintain and improve your health    Your preventive care needs      Remember to contact your care coordinator if you:   Are hospitalized or plan to be hospitalized    Have a fall     Have a change in your physical or mental health   Need help finding support or services    If you have questions or don t agree with your Support Plan, call me at [phone]. You can also call me if your needs change. TTY users call the [place] [employer] at 370 or [phone] (dmqcpm-nl-iqayzn relay service).    Sincerely,       Tello Ferris RN, PHN    E-mail: [email or web address]  Phone: [phone]      [facility]                [id number] accepted     (06/2024)                [address] [place] [address]  [phone]  fax [phone]  [facility]

## 2024-12-16 NOTE — TELEPHONE ENCOUNTER
[facility] Care Coordination Contact    Received after visit chart from care coordinator.  Completed following tasks: Mailed copy of support plan to member, Mailed [place] Choices signature sheet pages 3-4, Mailed Safe Medication Disposal , Submitted referrals/auths for ADC and transportation, and Updated services in Database  , Provider Signature - No Support Plan Shared:  Member indicates that they do not want their support plan shared with any EW providers.    [facility]:  Emailed required PCA documents to [facility].  Mailed copy to member.  Submitted referral for Consultation Services.    Sydni Menjivar  Care Management Specialist  [facility]  [phone]

## 2024-12-18 ENCOUNTER — PATIENT OUTREACH (OUTPATIENT)
Dept: CARE COORDINATION | Facility: CLINIC | Age: 89
End: 2024-12-18
Payer: COMMERCIAL

## 2024-12-20 NOTE — TELEPHONE ENCOUNTER
RN cannot approve Refill Request    RN can NOT refill this medication   Patient request early refill. Medication last filled 9/14/20 for qty 12 refill 2. Provider to advise on request. . Last office visit: 12/23/2019 Anjelica Kapadia MD Last Physical: Visit date not found Last MTM visit: Visit date not found Last visit same specialty: 12/23/2019 Anjelica Kapadia MD.  Next visit within 3 mo: Visit date not found  Next physical within 3 mo: Visit date not found      Zhang Daily, [facility] Connection Triage/Med Refill 5/17/2021    Requested Prescriptions   Pending Prescriptions Disp Refills     alendronate (FOSAMAX) 70 MG tablet [Pharmacy Med Name: ALENDRONATE SODIUM 70 MG TAB] 12 tablet 2     Sig: TAKE 1 TAB BY MOUTH EVERY 7 DAYS. TAKE WITH LARGE GLASS OR WATER & DO NOT LIE DOWN FOR 2HRS AFTER.       Biphosphonates Refill Protocol Passed - 5/16/2021 12:17 AM        Passed - PCP or prescribing provider visit in last 12 months     Last office visit with prescriber/PCP: 12/23/2019 Anjelica Kapadia MD OR same dept: Visit date not found OR same specialty: 12/23/2019 Anjelica Kapadia MD  Last physical: Visit date not found Last MTM visit: Visit date not found   Next visit within 3 mo: Visit date not found  Next physical within 3 mo: Visit date not found  Prescriber OR PCP: Anjelica Kapadia MD  Last diagnosis associated with med order: 1. Osteoporosis  - alendronate (FOSAMAX) 70 MG tablet [Pharmacy Med Name: ALENDRONATE SODIUM 70 MG TAB]; TAKE 1 TAB BY MOUTH EVERY 7 DAYS. TAKE WITH LARGE GLASS OR WATER & DO NOT LIE DOWN FOR 2HRS AFTER.  Dispense: 12 tablet; Refill: 2    If protocol passes may refill for 12 months if within 3 months of last provider visit (or a total of 15 months).             Passed - Serum creatinine in last 12 months     Creatinine   Date Value Ref Range Status   09/29/2020 0.96 0.60 - 1.10 mg/dL Final                    in chart

## 2025-01-06 ENCOUNTER — TELEPHONE (OUTPATIENT)
Dept: INTERNAL MEDICINE | Facility: CLINIC | Age: OVER 89
End: 2025-01-06

## 2025-01-06 ENCOUNTER — OFFICE VISIT (OUTPATIENT)
Dept: INTERNAL MEDICINE | Facility: CLINIC | Age: OVER 89
End: 2025-01-06
Payer: COMMERCIAL

## 2025-01-06 VITALS
HEART RATE: 88 BPM | TEMPERATURE: 98.5 F | SYSTOLIC BLOOD PRESSURE: 106 MMHG | DIASTOLIC BLOOD PRESSURE: 71 MMHG | BODY MASS INDEX: 24.33 KG/M2 | WEIGHT: 142.5 LBS | HEIGHT: 64 IN | OXYGEN SATURATION: 98 % | RESPIRATION RATE: 17 BRPM

## 2025-01-06 DIAGNOSIS — R42 DIZZINESS: ICD-10-CM

## 2025-01-06 DIAGNOSIS — L30.9 DERMATITIS: ICD-10-CM

## 2025-01-06 DIAGNOSIS — H81.10 BENIGN PAROXYSMAL POSITIONAL VERTIGO, UNSPECIFIED LATERALITY: Primary | ICD-10-CM

## 2025-01-06 DIAGNOSIS — R26.89 BALANCE PROBLEMS: ICD-10-CM

## 2025-01-06 DIAGNOSIS — E03.9 HYPOTHYROIDISM, UNSPECIFIED TYPE: ICD-10-CM

## 2025-01-06 DIAGNOSIS — M80.00XS AGE-RELATED OSTEOPOROSIS WITH CURRENT PATHOLOGICAL FRACTURE, SEQUELA: ICD-10-CM

## 2025-01-06 DIAGNOSIS — M54.2 NECK PAIN: ICD-10-CM

## 2025-01-06 DIAGNOSIS — G44.209 TENSION HEADACHE: ICD-10-CM

## 2025-01-06 DIAGNOSIS — E87.6 LOW SERUM POTASSIUM: ICD-10-CM

## 2025-01-06 LAB
ANION GAP SERPL CALCULATED.3IONS-SCNC: 10 MMOL/L (ref 7–15)
BUN SERPL-MCNC: 16.2 MG/DL (ref 8–23)
CALCIUM SERPL-MCNC: 9.7 MG/DL (ref 8.8–10.4)
CHLORIDE SERPL-SCNC: 98 MMOL/L (ref 98–107)
CREAT SERPL-MCNC: 0.92 MG/DL (ref 0.51–0.95)
CRP SERPL-MCNC: 58.4 MG/L
EGFRCR SERPLBLD CKD-EPI 2021: 57 ML/MIN/1.73M2
ERYTHROCYTE [SEDIMENTATION RATE] IN BLOOD BY WESTERGREN METHOD: 39 MM/HR (ref 0–30)
GLUCOSE SERPL-MCNC: 105 MG/DL (ref 70–99)
HCO3 SERPL-SCNC: 28 MMOL/L (ref 22–29)
MAGNESIUM SERPL-MCNC: 2.1 MG/DL (ref 1.7–2.3)
POTASSIUM SERPL-SCNC: 3.5 MMOL/L (ref 3.4–5.3)
SODIUM SERPL-SCNC: 136 MMOL/L (ref 135–145)
T4 FREE SERPL-MCNC: 1.18 NG/DL (ref 0.9–1.7)
TSH SERPL DL<=0.005 MIU/L-ACNC: 5.26 UIU/ML (ref 0.3–4.2)
VIT D+METAB SERPL-MCNC: 55 NG/ML (ref 20–50)

## 2025-01-06 PROCEDURE — G2211 COMPLEX E/M VISIT ADD ON: HCPCS | Performed by: INTERNAL MEDICINE

## 2025-01-06 PROCEDURE — 99214 OFFICE O/P EST MOD 30 MIN: CPT | Performed by: INTERNAL MEDICINE

## 2025-01-06 PROCEDURE — 86140 C-REACTIVE PROTEIN: CPT | Performed by: INTERNAL MEDICINE

## 2025-01-06 PROCEDURE — 36415 COLL VENOUS BLD VENIPUNCTURE: CPT | Performed by: INTERNAL MEDICINE

## 2025-01-06 PROCEDURE — 83735 ASSAY OF MAGNESIUM: CPT | Performed by: INTERNAL MEDICINE

## 2025-01-06 PROCEDURE — 84443 ASSAY THYROID STIM HORMONE: CPT | Performed by: INTERNAL MEDICINE

## 2025-01-06 PROCEDURE — 84439 ASSAY OF FREE THYROXINE: CPT | Performed by: INTERNAL MEDICINE

## 2025-01-06 PROCEDURE — 85652 RBC SED RATE AUTOMATED: CPT | Performed by: INTERNAL MEDICINE

## 2025-01-06 PROCEDURE — 82306 VITAMIN D 25 HYDROXY: CPT | Performed by: INTERNAL MEDICINE

## 2025-01-06 PROCEDURE — 90662 IIV NO PRSV INCREASED AG IM: CPT | Performed by: INTERNAL MEDICINE

## 2025-01-06 PROCEDURE — G0008 ADMIN INFLUENZA VIRUS VAC: HCPCS | Performed by: INTERNAL MEDICINE

## 2025-01-06 PROCEDURE — 80048 BASIC METABOLIC PNL TOTAL CA: CPT | Performed by: INTERNAL MEDICINE

## 2025-01-06 RX ORDER — TOPIRAMATE 25 MG/1
25 TABLET, FILM COATED ORAL DAILY
Qty: 30 TABLET | Refills: 11 | Status: SHIPPED | OUTPATIENT
Start: 2025-01-06

## 2025-01-06 RX ORDER — TIZANIDINE 2 MG/1
2 TABLET ORAL 3 TIMES DAILY PRN
Qty: 30 TABLET | Refills: 3 | Status: SHIPPED | OUTPATIENT
Start: 2025-01-06

## 2025-01-06 RX ORDER — TRIAMCINOLONE ACETONIDE 1 MG/G
CREAM TOPICAL 2 TIMES DAILY
Qty: 80 G | Refills: 3 | Status: SHIPPED | OUTPATIENT
Start: 2025-01-06

## 2025-01-06 RX ORDER — MECLIZINE HYDROCHLORIDE 25 MG/1
25 TABLET ORAL
Qty: 90 TABLET | Refills: 11 | Status: SHIPPED | OUTPATIENT
Start: 2025-01-06

## 2025-01-06 RX ORDER — ONDANSETRON 4 MG/1
4 TABLET, ORALLY DISINTEGRATING ORAL EVERY 8 HOURS PRN
Qty: 30 TABLET | Refills: 11 | Status: SHIPPED | OUTPATIENT
Start: 2025-01-06

## 2025-01-06 ASSESSMENT — PAIN SCALES - GENERAL: PAINLEVEL_OUTOF10: EXTREME PAIN (8)
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
## 2025-01-06 NOTE — NURSING NOTE
Pt tolerated injection (Influenza Vaccine 65+: Fluzone HD). Site (Left deltoid) was cleansed with alcohol prior to injections. No pain, burning, swelling or redness at the site of the injection. Patient instructed to remain in clinic for 15 minutes afterwards, and to report any adverse reactions    Interpreted by son - Said

## 2025-01-06 NOTE — PATIENT INSTRUCTIONS
For dizziness/spinning: Take meclizine as needed, Zofran for nausea as needed, vestibular PT    Start Topamax daily to help decrease frequency of headaches.     3. Due to new compression fracture , I would recommend changing from Fosamax to IV Reclast once a year.  Increase dairy in diet for calcium: milk, yogurt,     4. Flu shot today, get Covid vaccine later at the pharmacy.      5. Heating pad for neck muscles, tizanidine as needed.    6. Will check potassium levels.     7. Use triamcinolone cream 2 weeks on, 1 week off for itching.     8. Take omeprazole daily before a meal.

## 2025-01-07 ENCOUNTER — TELEPHONE (OUTPATIENT)
Dept: INTERNAL MEDICINE | Facility: CLINIC | Age: OVER 89
End: 2025-01-07
Payer: COMMERCIAL

## 2025-01-07 DIAGNOSIS — R05.1 ACUTE COUGH: Primary | ICD-10-CM

## 2025-01-07 NOTE — TELEPHONE ENCOUNTER
Please let pt's son know results:    Potassium level is good. Kidney function is good.    Inflammatory markers are elevated, this can be due to infection or inflammation in the temporal arteries ( arteries around temple area) and can cause headaches and dizziness.  Does Radha have any fevers, chills, cough, urinary burning?  If not, I would like to consult with rheumatology if prednisone and temporal artery biopsy is recommended. To get answer more timely, we can do an e-visit: rheumatology will review her chart without seeing her in person and charge insurance for e-consult. Let me know if he is agreeable.    Dr GAMBOA

## 2025-01-07 NOTE — LETTER
January 9, 2025      Radha Mcmanus  [address]   [place] [address]        Dear ,    We are writing to inform you of your test results.    Potassium level is good. Kidney function is good.     Inflammatory markers are elevated, this can be due to infection or inflammation in the temporal arteries ( arteries around temple area) and can cause headaches and dizziness.  Does Radha have any fevers, chills, cough, urinary burning?  If not, I would like to consult with rheumatology if prednisone and temporal artery biopsy is recommended. To get answer more timely, we can do an e-visit: rheumatology will review her chart without seeing her in person and charge insurance for e-consult. Let me know if he is agreeable.     Latest Reference Range & Units 01/06/25 14:26   Sodium 135 - 145 mmol/L 136   Potassium 3.4 - 5.3 mmol/L 3.5   Chloride 98 - 107 mmol/L 98   Carbon Dioxide (CO2) 22 - 29 mmol/L 28   Urea Nitrogen 8.0 - 23.0 mg/dL 16.2   Creatinine 0.51 - 0.95 mg/dL 0.92   GFR Estimate >60 mL/min/1.73m2 57 (L)   Calcium 8.8 - 10.4 mg/dL 9.7   Anion Gap 7 - 15 mmol/L 10   Magnesium 1.7 - 2.3 mg/dL 2.1   CRP Inflammation <5.00 mg/L 58.40 (H)   Glucose 70 - 99 mg/dL 105 (H)   T4 Free 0.90 - 1.70 ng/dL 1.18   TSH 0.30 - 4.20 uIU/mL 5.26 (H)   Vitamin D, Total (25-Hydroxy) 20 - 50 ng/mL 55 (H)   Sed Rate 0 - 30 mm/hr 39 (H)   (L): Data is abnormally low  (H): Data is abnormally high    If you have any questions or concerns, please call the clinic at the number listed above.       Sincerely,        Dr GAMBOA

## 2025-01-09 RX ORDER — DOXYCYCLINE HYCLATE 100 MG
100 TABLET ORAL 2 TIMES DAILY
Qty: 20 TABLET | Refills: 0 | Status: SHIPPED | OUTPATIENT
Start: 2025-01-09 | End: 2025-01-19

## 2025-01-09 NOTE — TELEPHONE ENCOUNTER
Spoke with patient's son Said (CTC on file) and relayed information below from Dr Kapadia. He verbalized understanding and states she has had chills and a cough on and off. He has no questions about the labs but does want to know what to do about the symptoms.

## 2025-01-09 NOTE — TELEPHONE ENCOUNTER
Spoke with Said and relayed information below from Dr Kapadia. He verbalized understanding and has no further questions.

## 2025-01-09 NOTE — TELEPHONE ENCOUNTER
Looks like pt has had PNA in September.  For cough and chills, I am prescribing a new antibiotic for her to take and would like her to repeat chest x-ray, orders placed.

## 2025-01-22 ENCOUNTER — TELEPHONE (OUTPATIENT)
Dept: INTERNAL MEDICINE | Facility: CLINIC | Age: OVER 89
End: 2025-01-22
Payer: COMMERCIAL

## 2025-01-22 NOTE — TELEPHONE ENCOUNTER
January 22, 2025    [facility] Medical Report was received via fax for Dr. Kapadia.  Patient label was attached to paperwork and placed in provider's inbox to be signed.    Christianne Cheng

## 2025-03-09 DIAGNOSIS — E03.9 HYPOTHYROIDISM, UNSPECIFIED TYPE: ICD-10-CM

## 2025-03-10 RX ORDER — LEVOTHYROXINE SODIUM 25 UG/1
TABLET ORAL
Qty: 90 TABLET | Refills: 1 | Status: SHIPPED | OUTPATIENT
Start: 2025-03-10

## 2025-03-25 DIAGNOSIS — H57.9 ITCHY EYES: ICD-10-CM

## 2025-03-26 RX ORDER — OLOPATADINE HYDROCHLORIDE 2 MG/ML
1 SOLUTION/ DROPS OPHTHALMIC DAILY
Qty: 2.5 ML | Refills: 8 | Status: SHIPPED | OUTPATIENT
Start: 2025-03-26

## 2025-06-16 ENCOUNTER — OFFICE VISIT (OUTPATIENT)
Dept: OPHTHALMOLOGY | Facility: CLINIC | Age: OVER 89
End: 2025-06-16
Attending: OPHTHALMOLOGY
Payer: COMMERCIAL

## 2025-06-16 DIAGNOSIS — H25.13 NUCLEAR SENILE CATARACT OF BOTH EYES: Primary | ICD-10-CM

## 2025-06-16 DIAGNOSIS — H18.513 FUCHS' CORNEAL DYSTROPHY OF BOTH EYES: ICD-10-CM

## 2025-06-16 DIAGNOSIS — H18.892 PERSISTENT EPITHELIAL DEFECT OF LEFT CORNEA: ICD-10-CM

## 2025-06-16 PROCEDURE — 76514 ECHO EXAM OF EYE THICKNESS: CPT | Performed by: OPHTHALMOLOGY

## 2025-06-16 PROCEDURE — G0463 HOSPITAL OUTPT CLINIC VISIT: HCPCS | Performed by: OPHTHALMOLOGY

## 2025-06-16 PROCEDURE — 92025 CPTRIZED CORNEAL TOPOGRAPHY: CPT | Performed by: OPHTHALMOLOGY

## 2025-06-16 PROCEDURE — 99214 OFFICE O/P EST MOD 30 MIN: CPT | Mod: GC | Performed by: OPHTHALMOLOGY

## 2025-06-16 RX ORDER — PREDNISOLONE ACETATE 10 MG/ML
1-2 SUSPENSION/ DROPS OPHTHALMIC 2 TIMES DAILY
Qty: 5 ML | Refills: 2 | Status: SHIPPED | OUTPATIENT
Start: 2025-06-16

## 2025-06-16 RX ORDER — MOXIFLOXACIN 5 MG/ML
1 SOLUTION/ DROPS OPHTHALMIC 4 TIMES DAILY
Qty: 3 ML | Refills: 2 | Status: SHIPPED | OUTPATIENT
Start: 2025-06-16

## 2025-06-16 ASSESSMENT — REFRACTION_MANIFEST
OD_CYLINDER: +4.00
OD_AXIS: 167
OD_SPHERE: -2.00

## 2025-06-16 ASSESSMENT — CONF VISUAL FIELD
OD_INFERIOR_NASAL_RESTRICTION: 0
OD_SUPERIOR_NASAL_RESTRICTION: 0
OS_SUPERIOR_NASAL_RESTRICTION: 1
OS_SUPERIOR_TEMPORAL_RESTRICTION: 1
OD_NORMAL: 1
OS_INFERIOR_TEMPORAL_RESTRICTION: 1
OS_INFERIOR_NASAL_RESTRICTION: 1
OD_INFERIOR_TEMPORAL_RESTRICTION: 0
OD_SUPERIOR_TEMPORAL_RESTRICTION: 0

## 2025-06-16 ASSESSMENT — PACHYMETRY
OS_CT(UM): 799
OD_CT(UM): 498

## 2025-06-16 ASSESSMENT — VISUAL ACUITY
METHOD: SNELLEN - LINEAR
OS_SC: 3'/200E
OD_SC: 20/250

## 2025-06-16 ASSESSMENT — TONOMETRY
OD_IOP_MMHG: 07
IOP_METHOD: TONOPEN
OS_IOP_MMHG: 03

## 2025-06-16 NOTE — PROGRESS NOTES
Chief complaint   Cataract and corneal edema    HPI    Radha Mcmanus 89 year old female referred for cataract surgery and evaluation of corneal edema. Patient is here with her son. She reports her vision has worsened in both eyes, left eye worse than right eye in the past several months and she would like to see better.       Interval hx 06/16/2025   Chief Complaint(s) and History of Present Illness(es)       Fuch's Dystrophy Follow Up    In left eye.  Associated symptoms include Negative for floaters.  Treatments tried include no treatments.  Pain was noted as 5/10.             Comments    The patient's son interprets for the patient.  The patient notes Left side under the eye pain that started two weeks ago.  She reports her vision is the same as last visit.  Marixa Casillas, COA, COA 2:36 PM 06/16/2025                            Past ocular history   Prior eye surgery/laser/Trauma: No  CTL wearer:No  Glasses : Yes for distance  Family Hx of eye disease: -    PMH     Past Medical History:   Diagnosis Date    Hypertension     Ulcer        PSH     Past Surgical History:   Procedure Laterality Date    ORTHOPEDIC SURGERY         Meds     Current Outpatient Medications   Medication Sig Dispense Refill    acetaminophen (TYLENOL) 500 MG tablet Take 2 tablets (1,000 mg) by mouth 3 times daily. 180 tablet 1    alendronate (FOSAMAX) 70 MG tablet TAKE 1 TABLET BY MOUTH EVERY 7 DAYS WITH LARGE GLASS OF WATER. DO NOT LIE DOWN FOR 2HRS AFTER. 12 tablet 3    amLODIPine (NORVASC) 5 MG tablet TAKE 1 TABLET BY MOUTH EVERY DAY 90 tablet 2    aspirin (ASA) 81 MG EC tablet Take 81 mg by mouth      Calcium Citrate-Vitamin D 250-200 MG-UNIT TABS Take 1 tablet by mouth      levothyroxine (SYNTHROID/LEVOTHROID) 25 MCG tablet TAKE 1 TAB BY MOUTH DAILY WITH A GLASS OF WATER. SEPARATE FROM FOOD, COFFEE, MEDICATIONS AND SUPPLEMENTS BY 2 HOURS. 90 tablet 1    omeprazole (PRILOSEC) 20 MG DR capsule TAKE 1 CAPSULE BY MOUTH EVERY DAY 90 capsule  2    polyethylene glycol (MIRALAX) 17 g packet Take 17 g by mouth daily 30 packet 11    topiramate (TOPAMAX) 25 MG tablet Take 1 tablet (25 mg) by mouth daily. 30 tablet 11    traMADol (ULTRAM) 50 MG tablet Take 1 tablet (50 mg) by mouth at bedtime. 10 tablet 0    triamcinolone (KENALOG) 0.1 % external cream Apply topically 2 times daily. Use 2 weeks on, 1 week off. May repeat. 80 g 3    diclofenac (VOLTAREN) 1 % topical gel Apply 2-4 grams to area up to four times daily using enclosed dosing card. 100 g 1    fluticasone (FLONASE) 50 MCG/ACT nasal spray  (Patient not taking: Reported on 6/16/2025)      gabapentin (NEURONTIN) 100 MG capsule 1-2 caps at bed time as needed for pain 60 capsule 1    meclizine (ANTIVERT) 25 MG tablet Take 1 tablet (25 mg) by mouth nightly as needed for dizziness. (Patient not taking: Reported on 6/16/2025) 90 tablet 11    olopatadine (PATADAY) 0.2 % ophthalmic solution PLACE 1 DROP INTO BOTH EYES DAILY (Patient not taking: Reported on 6/16/2025) 2.5 mL 8    ondansetron (ZOFRAN ODT) 4 MG ODT tab Take 1 tablet (4 mg) by mouth every 8 hours as needed for nausea. (Patient not taking: Reported on 6/16/2025) 30 tablet 11    ORDER FOR DME Equipment being ordered: Long handled shoe horn and reacher grabber 1 each 0    tiZANidine (ZANAFLEX) 2 MG tablet Take 1 tablet (2 mg) by mouth 3 times daily as needed for muscle spasms. 30 tablet 3     No current facility-administered medications for this visit.       Labs   -    Imaging   Pentacam   Endothelial spectroscopy    Drops Currently Taking   Artificial tears    Assessment/Plan   # Fuchs endothelial dystrophy, both eyes   Advanced corneal edema with indetectable cell number left eye and right eye 900  Patient waited too long to get he surgery  Now both eyes have corneal edema left eye>right eye with poor view to AC.   NX5+ need extracapsular capsular   Elevated pachy left eye>right eye    Discussed DSEAK VS PK. Patient and son elected for DSEAK FOR  NOW  Discussed that may need to do PK at the same surgery   - Discussed R/B/A for DSEA in the left eye including infection, detachment of graft, failure.  Patient agree to proceed with the surgery. Told her she will need to stay on the back for few days and there might be a need for reinjection of gaz    #Senile Cataracts OU  Patient is having difficulty with daily activities due to visual impairment. Patient feels that they are no longer managing with current correction and would like to move forward with cataract surgery. Explained benefits alternatives and risks including but not limited to loss of vision, severe infection, retinal detachment, need for more surgery, visual disturbances etc...  Informed patient that reaching uncorrected vision aim (without glasses) after cataract surgery is not certain. Made patient aware they may need glasses, laser vision correction or in worst case scenario, IOL exchange.        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  I personally obtained the chief complaint(s) and history of present illness.  I confirmed and edited as necessary the review of systems, past medical/surgical history, family history, social history, and examination findings as documented by others; and I examined the patient myself.  I personally reviewed the relevant tests, images, and reports as documented above.  I formulated and edited as necessary the assessment and plan and discussed the findings and management plan with the patient and family. I personally reviewed the ophthalmic  test(s) associated with this encounter, agree with the interpretation(s) as documented by the resident/fellow, and have edited the corresponding report(s) as necessary.- Derek Guy MD

## 2025-06-16 NOTE — NURSING NOTE
Chief Complaints and History of Present Illnesses   Patient presents with    Fuch's Dystrophy Follow Up     Chief Complaint(s) and History of Present Illness(es)       Fuch's Dystrophy Follow Up              Laterality: left eye    Associated symptoms: Negative for floaters    Treatments tried: no treatments    Pain scale: 5/10              Comments    The patient's son interprets for the patient.  The patient notes Left side under the eye pain that started two weeks ago.  She reports her vision is the same as last visit.  Marixa Casillas, COA, COA 2:36 PM 06/16/2025

## 2025-06-17 ASSESSMENT — SLIT LAMP EXAM - LIDS
COMMENTS: NORMAL
COMMENTS: NORMAL

## 2025-06-18 ENCOUNTER — TELEPHONE (OUTPATIENT)
Dept: OPHTHALMOLOGY | Facility: CLINIC | Age: OVER 89
End: 2025-06-18
Payer: COMMERCIAL

## 2025-06-18 PROBLEM — H25.13 NUCLEAR SENILE CATARACT OF BOTH EYES: Status: ACTIVE | Noted: 2025-06-16

## 2025-06-18 PROBLEM — H18.513 FUCHS' CORNEAL DYSTROPHY OF BOTH EYES: Status: ACTIVE | Noted: 2023-12-22

## 2025-06-18 NOTE — TELEPHONE ENCOUNTER
Phone call to patient to schedule surgery writer did not receive an answer but did leave voicemail for patient to call back .    [phone]          Brandie Motta on 6/18/2025 at 10:16 AM

## 2025-06-18 NOTE — TELEPHONE ENCOUNTER
Called patient to schedule surgery with Dr. Guy     Spoke with: Said (Patients son)    Date(s) of Surgery: 8/7/25     Patient aware of approximate arrival time: Yes at pre op nursing will call      Tissue: Not Applicable Ordered: Not Applicable     [facility] of surgery: [facility]     Pre-Op H&P: [facility]      Informed patient that they need to call to schedule pre-op H&P within 30 days of surgery date: Yes    Post-Op Appt Dates:   8/8/25 9:30am   8/15/25 10:30am   9/5/25 9:30am   10/3/25 10:45am     Discussed with patient pre-op RN will call 2-3 days prior to surgery with arrival time and instructions:  Yes       Standard Surgery Packet Sent: Yes 06/18/25  via Mail - Standard      Additional Information Sent in Packet: Post-op Appointment Itinerary    Informed patient that they will need an adult  to bring patient home from surgery: Yes  : son          Additional Comments:        All patients questions were answered and was instructed to review surgical packet and call back [phone] with any questions or concerns.       Brandie Motta on 6/18/2025 at 2:00 PM

## 2025-06-19 NOTE — TELEPHONE ENCOUNTER
Ordered tissue - need both DSEAK and PK tissue ordered for this case in case DSEAK does not work Demi Hand on 6/19/2025 at 8:21 AM

## 2025-07-03 ENCOUNTER — PATIENT OUTREACH (OUTPATIENT)
Dept: GERIATRIC MEDICINE | Facility: CLINIC | Age: OVER 89
End: 2025-07-03
Payer: COMMERCIAL

## 2025-07-03 NOTE — PROGRESS NOTES
[facility] Care Coordination Contact      [facility] Six-Month Telephone Assessment    6 month telephone assessment completed on 7/3/25.    ER visits: Yes -  [facility] 12/12/24 for nausea and vomiting.  Hospitalizations: No  TCU stays: No  Significant health status changes: No  Falls/Injuries: No  ADL/IADL changes: No  Changes in services: No    Caregiver Assessment follow up:  N/A. Caregiver is paid.     Goals: See Support Plan for goal progress documentation.      Will see member in 6 months for an annual health risk assessment.   Encouraged member to call CC with any questions or concerns in the meantime.     Tello Ferris RN BSN PHN  [facility] Care Coordinator  [phone]  Fax:[phone]

## 2025-07-07 ENCOUNTER — APPOINTMENT (OUTPATIENT)
Dept: CT IMAGING | Facility: CLINIC | Age: OVER 89
End: 2025-07-07
Attending: EMERGENCY MEDICINE
Payer: COMMERCIAL

## 2025-07-07 ENCOUNTER — APPOINTMENT (OUTPATIENT)
Dept: GENERAL RADIOLOGY | Facility: CLINIC | Age: OVER 89
End: 2025-07-07
Attending: EMERGENCY MEDICINE
Payer: COMMERCIAL

## 2025-07-07 ENCOUNTER — NURSE TRIAGE (OUTPATIENT)
Dept: INTERNAL MEDICINE | Facility: CLINIC | Age: OVER 89
End: 2025-07-07
Payer: COMMERCIAL

## 2025-07-07 ENCOUNTER — HOSPITAL ENCOUNTER (OUTPATIENT)
Facility: CLINIC | Age: OVER 89
Setting detail: OBSERVATION
Discharge: HOME | End: 2025-07-09
Attending: EMERGENCY MEDICINE | Admitting: EMERGENCY MEDICINE
Payer: COMMERCIAL

## 2025-07-07 DIAGNOSIS — S22.22XA CLOSED FRACTURE OF BODY OF STERNUM, INITIAL ENCOUNTER: Primary | ICD-10-CM

## 2025-07-07 DIAGNOSIS — R42 VERTIGO: ICD-10-CM
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
## 2025-07-07 LAB
ALBUMIN SERPL BCG-MCNC: 3.9 G/DL (ref 3.5–5.2)
ALBUMIN UR-MCNC: NEGATIVE MG/DL
ALP SERPL-CCNC: 76 U/L (ref 40–150)
ALT SERPL W P-5'-P-CCNC: 14 U/L (ref 0–50)
ANION GAP SERPL CALCULATED.3IONS-SCNC: 13 MMOL/L (ref 7–15)
APPEARANCE UR: CLEAR
AST SERPL W P-5'-P-CCNC: 26 U/L (ref 0–45)
ATRIAL RATE - MUSE: 85 BPM
BASOPHILS # BLD AUTO: 0 10E3/UL (ref 0–0.2)
BASOPHILS NFR BLD AUTO: 1 %
BILIRUB SERPL-MCNC: 0.7 MG/DL
BILIRUB UR QL STRIP: NEGATIVE
BUN SERPL-MCNC: 10.6 MG/DL (ref 8–23)
CALCIUM SERPL-MCNC: 9 MG/DL (ref 8.8–10.4)
CHLORIDE SERPL-SCNC: 98 MMOL/L (ref 98–107)
COLOR UR AUTO: ABNORMAL
CREAT SERPL-MCNC: 0.73 MG/DL (ref 0.51–0.95)
DIASTOLIC BLOOD PRESSURE - MUSE: NORMAL MMHG
EGFRCR SERPLBLD CKD-EPI 2021: 76 ML/MIN/1.73M2
EOSINOPHIL # BLD AUTO: 0.3 10E3/UL (ref 0–0.7)
EOSINOPHIL NFR BLD AUTO: 5 %
ERYTHROCYTE [DISTWIDTH] IN BLOOD BY AUTOMATED COUNT: 13.2 % (ref 10–15)
GLUCOSE SERPL-MCNC: 99 MG/DL (ref 70–99)
GLUCOSE UR STRIP-MCNC: NEGATIVE MG/DL
HCO3 SERPL-SCNC: 26 MMOL/L (ref 22–29)
HCT VFR BLD AUTO: 40.1 % (ref 35–47)
HGB BLD-MCNC: 13.6 G/DL (ref 11.7–15.7)
HGB UR QL STRIP: ABNORMAL
IMM GRANULOCYTES # BLD: 0 10E3/UL
IMM GRANULOCYTES NFR BLD: 0 %
INR PPP: 1.01 (ref 0.85–1.15)
INTERPRETATION ECG - MUSE: NORMAL
KETONES UR STRIP-MCNC: NEGATIVE MG/DL
LEUKOCYTE ESTERASE UR QL STRIP: NEGATIVE
LYMPHOCYTES # BLD AUTO: 1.7 10E3/UL (ref 0.8–5.3)
LYMPHOCYTES NFR BLD AUTO: 33 %
MAGNESIUM SERPL-MCNC: 2 MG/DL (ref 1.7–2.3)
MCH RBC QN AUTO: 29.4 PG (ref 26.5–33)
MCHC RBC AUTO-ENTMCNC: 33.9 G/DL (ref 31.5–36.5)
MCV RBC AUTO: 87 FL (ref 78–100)
MONOCYTES # BLD AUTO: 0.5 10E3/UL (ref 0–1.3)
MONOCYTES NFR BLD AUTO: 10 %
MUCOUS THREADS #/AREA URNS LPF: PRESENT /LPF
NEUTROPHILS # BLD AUTO: 2.8 10E3/UL (ref 1.6–8.3)
NEUTROPHILS NFR BLD AUTO: 52 %
NITRATE UR QL: NEGATIVE
NRBC # BLD AUTO: 0 10E3/UL
NRBC BLD AUTO-RTO: 0 /100
P AXIS - MUSE: 65 DEGREES
PH UR STRIP: 7 [PH] (ref 5–7)
PLATELET # BLD AUTO: 163 10E3/UL (ref 150–450)
POTASSIUM SERPL-SCNC: 3.4 MMOL/L (ref 3.4–5.3)
PR INTERVAL - MUSE: 168 MS
PROT SERPL-MCNC: 7.8 G/DL (ref 6.4–8.3)
PROTHROMBIN TIME: 13.7 SECONDS (ref 11.8–14.8)
QRS DURATION - MUSE: 90 MS
QT - MUSE: 376 MS
QTC - MUSE: 447 MS
R AXIS - MUSE: -58 DEGREES
RBC # BLD AUTO: 4.62 10E6/UL (ref 3.8–5.2)
RBC URINE: 1 /HPF
SODIUM SERPL-SCNC: 137 MMOL/L (ref 135–145)
SP GR UR STRIP: 1.01 (ref 1–1.03)
SQUAMOUS EPITHELIAL: <1 /HPF
SYSTOLIC BLOOD PRESSURE - MUSE: NORMAL MMHG
T AXIS - MUSE: -1 DEGREES
TROPONIN T SERPL HS-MCNC: 8 NG/L
TROPONIN T SERPL HS-MCNC: 9 NG/L
UROBILINOGEN UR STRIP-MCNC: NORMAL MG/DL
VENTRICULAR RATE- MUSE: 85 BPM
WBC # BLD AUTO: 5.4 10E3/UL (ref 4–11)
WBC URINE: 1 /HPF

## 2025-07-07 PROCEDURE — 250N000013 HC RX MED GY IP 250 OP 250 PS 637: Performed by: EMERGENCY MEDICINE

## 2025-07-07 PROCEDURE — 93005 ELECTROCARDIOGRAM TRACING: CPT

## 2025-07-07 PROCEDURE — 93010 ELECTROCARDIOGRAM REPORT: CPT | Performed by: EMERGENCY MEDICINE

## 2025-07-07 PROCEDURE — 85610 PROTHROMBIN TIME: CPT | Performed by: EMERGENCY MEDICINE

## 2025-07-07 PROCEDURE — 99223 1ST HOSP IP/OBS HIGH 75: CPT | Mod: AI | Performed by: SURGERY

## 2025-07-07 PROCEDURE — 70450 CT HEAD/BRAIN W/O DYE: CPT

## 2025-07-07 PROCEDURE — 76604 US EXAM CHEST: CPT | Mod: 26 | Performed by: EMERGENCY MEDICINE

## 2025-07-07 PROCEDURE — 250N000011 HC RX IP 250 OP 636: Performed by: EMERGENCY MEDICINE

## 2025-07-07 PROCEDURE — 73030 X-RAY EXAM OF SHOULDER: CPT | Mod: RT

## 2025-07-07 PROCEDURE — 96376 TX/PRO/DX INJ SAME DRUG ADON: CPT

## 2025-07-07 PROCEDURE — 83735 ASSAY OF MAGNESIUM: CPT | Performed by: EMERGENCY MEDICINE

## 2025-07-07 PROCEDURE — 36415 COLL VENOUS BLD VENIPUNCTURE: CPT | Performed by: EMERGENCY MEDICINE

## 2025-07-07 PROCEDURE — 250N000011 HC RX IP 250 OP 636: Mod: JZ | Performed by: EMERGENCY MEDICINE

## 2025-07-07 PROCEDURE — 71250 CT THORAX DX C-: CPT

## 2025-07-07 PROCEDURE — 96375 TX/PRO/DX INJ NEW DRUG ADDON: CPT

## 2025-07-07 PROCEDURE — 85004 AUTOMATED DIFF WBC COUNT: CPT | Performed by: EMERGENCY MEDICINE

## 2025-07-07 PROCEDURE — 99285 EMERGENCY DEPT VISIT HI MDM: CPT | Mod: 25 | Performed by: EMERGENCY MEDICINE

## 2025-07-07 PROCEDURE — 84484 ASSAY OF TROPONIN QUANT: CPT | Performed by: EMERGENCY MEDICINE

## 2025-07-07 PROCEDURE — 999N000127 HC STATISTIC PERIPHERAL IV START W US GUIDANCE

## 2025-07-07 PROCEDURE — 96374 THER/PROPH/DIAG INJ IV PUSH: CPT

## 2025-07-07 PROCEDURE — G0378 HOSPITAL OBSERVATION PER HR: HCPCS

## 2025-07-07 PROCEDURE — 84155 ASSAY OF PROTEIN SERUM: CPT | Performed by: EMERGENCY MEDICINE

## 2025-07-07 PROCEDURE — 72125 CT NECK SPINE W/O DYE: CPT

## 2025-07-07 PROCEDURE — 81001 URINALYSIS AUTO W/SCOPE: CPT | Performed by: EMERGENCY MEDICINE

## 2025-07-07 PROCEDURE — 76604 US EXAM CHEST: CPT | Performed by: EMERGENCY MEDICINE

## 2025-07-07 RX ORDER — LEVOTHYROXINE SODIUM 25 UG/1
25 TABLET ORAL
Status: DISCONTINUED | OUTPATIENT
Start: 2025-07-08 | End: 2025-07-09 | Stop reason: HOSPADM

## 2025-07-07 RX ORDER — ACETAMINOPHEN 325 MG/1
650 TABLET ORAL ONCE
Status: COMPLETED | OUTPATIENT
Start: 2025-07-07 | End: 2025-07-07

## 2025-07-07 RX ORDER — GABAPENTIN 100 MG/1
100 CAPSULE ORAL AT BEDTIME
Status: DISCONTINUED | OUTPATIENT
Start: 2025-07-07 | End: 2025-07-09 | Stop reason: HOSPADM

## 2025-07-07 RX ORDER — ONDANSETRON 2 MG/ML
4 INJECTION INTRAMUSCULAR; INTRAVENOUS EVERY 6 HOURS PRN
Status: DISCONTINUED | OUTPATIENT
Start: 2025-07-07 | End: 2025-07-09 | Stop reason: HOSPADM

## 2025-07-07 RX ORDER — BISACODYL 10 MG
10 SUPPOSITORY, RECTAL RECTAL DAILY PRN
Status: DISCONTINUED | OUTPATIENT
Start: 2025-07-07 | End: 2025-07-09 | Stop reason: HOSPADM

## 2025-07-07 RX ORDER — POLYETHYLENE GLYCOL 3350 17 G/17G
17 POWDER, FOR SOLUTION ORAL DAILY
Status: DISCONTINUED | OUTPATIENT
Start: 2025-07-08 | End: 2025-07-09 | Stop reason: HOSPADM

## 2025-07-07 RX ORDER — AMOXICILLIN 250 MG
1-2 CAPSULE ORAL 2 TIMES DAILY
Status: DISCONTINUED | OUTPATIENT
Start: 2025-07-07 | End: 2025-07-09 | Stop reason: HOSPADM

## 2025-07-07 RX ORDER — ACETAMINOPHEN 325 MG/1
975 TABLET ORAL 3 TIMES DAILY
Status: DISCONTINUED | OUTPATIENT
Start: 2025-07-08 | End: 2025-07-07

## 2025-07-07 RX ORDER — DIPHENHYDRAMINE HYDROCHLORIDE 50 MG/ML
50 INJECTION, SOLUTION INTRAMUSCULAR; INTRAVENOUS ONCE
Status: COMPLETED | OUTPATIENT
Start: 2025-07-08 | End: 2025-07-08

## 2025-07-07 RX ORDER — MORPHINE SULFATE 2 MG/ML
4 INJECTION, SOLUTION INTRAMUSCULAR; INTRAVENOUS
Refills: 0 | Status: COMPLETED | OUTPATIENT
Start: 2025-07-07 | End: 2025-07-07

## 2025-07-07 RX ORDER — ASPIRIN 81 MG/1
81 TABLET ORAL DAILY
Status: DISCONTINUED | OUTPATIENT
Start: 2025-07-08 | End: 2025-07-09 | Stop reason: HOSPADM

## 2025-07-07 RX ORDER — ACETAMINOPHEN 325 MG/1
650 TABLET ORAL EVERY 4 HOURS PRN
Status: DISCONTINUED | OUTPATIENT
Start: 2025-07-07 | End: 2025-07-07

## 2025-07-07 RX ORDER — MORPHINE SULFATE 4 MG/ML
4 INJECTION, SOLUTION INTRAMUSCULAR; INTRAVENOUS ONCE
Refills: 0 | Status: COMPLETED | OUTPATIENT
Start: 2025-07-07 | End: 2025-07-07

## 2025-07-07 RX ORDER — ACETAMINOPHEN 500 MG
1000 TABLET ORAL 3 TIMES DAILY
Status: DISCONTINUED | OUTPATIENT
Start: 2025-07-08 | End: 2025-07-09 | Stop reason: HOSPADM

## 2025-07-07 RX ORDER — DIPHENHYDRAMINE HYDROCHLORIDE 50 MG/ML
50 INJECTION, SOLUTION INTRAMUSCULAR; INTRAVENOUS ONCE
Status: DISCONTINUED | OUTPATIENT
Start: 2025-07-08 | End: 2025-07-07

## 2025-07-07 RX ORDER — PANTOPRAZOLE SODIUM 40 MG/1
40 TABLET, DELAYED RELEASE ORAL DAILY
Status: DISCONTINUED | OUTPATIENT
Start: 2025-07-08 | End: 2025-07-09 | Stop reason: HOSPADM

## 2025-07-07 RX ORDER — AMLODIPINE BESYLATE 5 MG/1
5 TABLET ORAL DAILY
Status: DISCONTINUED | OUTPATIENT
Start: 2025-07-08 | End: 2025-07-09 | Stop reason: HOSPADM

## 2025-07-07 RX ORDER — OXYCODONE HYDROCHLORIDE 5 MG/1
5 TABLET ORAL EVERY 4 HOURS PRN
Status: DISCONTINUED | OUTPATIENT
Start: 2025-07-07 | End: 2025-07-09 | Stop reason: HOSPADM

## 2025-07-07 RX ORDER — ONDANSETRON 4 MG/1
4 TABLET, ORALLY DISINTEGRATING ORAL EVERY 6 HOURS PRN
Status: DISCONTINUED | OUTPATIENT
Start: 2025-07-07 | End: 2025-07-09 | Stop reason: HOSPADM

## 2025-07-07 RX ORDER — HYDROMORPHONE HCL IN WATER/PF 6 MG/30 ML
0.2 PATIENT CONTROLLED ANALGESIA SYRINGE INTRAVENOUS EVERY 4 HOURS PRN
Status: DISCONTINUED | OUTPATIENT
Start: 2025-07-07 | End: 2025-07-09 | Stop reason: HOSPADM

## 2025-07-07 RX ORDER — METHYLPREDNISOLONE SODIUM SUCCINATE 40 MG/ML
40 INJECTION INTRAMUSCULAR; INTRAVENOUS EVERY 4 HOURS
Status: COMPLETED | OUTPATIENT
Start: 2025-07-07 | End: 2025-07-08

## 2025-07-07 RX ORDER — PREDNISOLONE ACETATE 10 MG/ML
1-2 SUSPENSION/ DROPS OPHTHALMIC 2 TIMES DAILY
Status: DISCONTINUED | OUTPATIENT
Start: 2025-07-08 | End: 2025-07-09 | Stop reason: HOSPADM

## 2025-07-07 RX ORDER — TIZANIDINE 2 MG/1
2 TABLET ORAL 3 TIMES DAILY PRN
Status: DISCONTINUED | OUTPATIENT
Start: 2025-07-07 | End: 2025-07-09 | Stop reason: HOSPADM

## 2025-07-07 RX ORDER — HYDROMORPHONE HCL IN WATER/PF 6 MG/30 ML
0.1 PATIENT CONTROLLED ANALGESIA SYRINGE INTRAVENOUS EVERY 4 HOURS PRN
Status: DISCONTINUED | OUTPATIENT
Start: 2025-07-07 | End: 2025-07-09 | Stop reason: HOSPADM

## 2025-07-07 RX ADMIN — METHYLPREDNISOLONE SODIUM SUCCINATE 40 MG: 40 INJECTION, POWDER, FOR SOLUTION INTRAMUSCULAR; INTRAVENOUS at 23:26

## 2025-07-07 RX ADMIN — MORPHINE SULFATE 4 MG: 4 INJECTION INTRAVENOUS at 21:17

## 2025-07-07 RX ADMIN — MORPHINE SULFATE 4 MG: 2 INJECTION, SOLUTION INTRAMUSCULAR; INTRAVENOUS at 17:02

## 2025-07-07 RX ADMIN — ACETAMINOPHEN 650 MG: 500 TABLET ORAL at 16:45

## 2025-07-07 ASSESSMENT — COLUMBIA-SUICIDE SEVERITY RATING SCALE - C-SSRS
2. HAVE YOU ACTUALLY HAD ANY THOUGHTS OF KILLING YOURSELF IN THE PAST MONTH?: NO
6. HAVE YOU EVER DONE ANYTHING, STARTED TO DO ANYTHING, OR PREPARED TO DO ANYTHING TO END YOUR LIFE?: NO
1. IN THE PAST MONTH, HAVE YOU WISHED YOU WERE DEAD OR WISHED YOU COULD GO TO SLEEP AND NOT WAKE UP?: NO

## 2025-07-07 ASSESSMENT — ACTIVITIES OF DAILY LIVING (ADL)
ADLS_ACUITY_SCORE: 42
ADLS_ACUITY_SCORE: 44
ADLS_ACUITY_SCORE: 42
ADLS_ACUITY_SCORE: 44
ADLS_ACUITY_SCORE: 44
ADLS_ACUITY_SCORE: 42

## 2025-07-07 NOTE — ED NOTES
Bed: ED08  Expected date: 7/7/25  Expected time:   Means of arrival:   Comments:  AM in triage when clean

## 2025-07-07 NOTE — ED PROVIDER NOTES
"ED Provider Note  [facility]      History     Chief Complaint   Patient presents with    Fall    Dizziness     HPI  Radha Mcmanus is a 89 year old female who comes in due to a fall yesterday at 6 AM when getting up to use the bathroom.  She fell on her right side onto the bathtub where she hit her head, right shoulder and chest.  Since then she has had severe chest pain shoulder pain neck pain and pain on her skull at point of impact.  At the time of the fall she had an episode of nausea and vomiting after she hit her head.  Since then she denies recurrence of nausea and vomiting.  She has taken Tylenol which has relieved symptoms somewhat.  However since this morning the patient has experienced shortness of breath, due to pain with breathing.  She fell because she felt dizzy once she got out of bed in the middle of the night and went to the bathroom.  She typically feels dizzy with movement from a seated or laying down to a standing position.  She does not have any dizziness with movement of her head or at rest.  According to her son, she is less alert than usual after the fall.    A medically appropriate review of systems was performed with pertinent positives and negatives noted in the HPI, and all other systems negative.    Physical Exam   BP: 134/81  Pulse: 86  Temp: 98.7  F (37.1  C)  Resp: 16  Height: 167.6 cm (5' 6\")  Weight: 61.7 kg (136 lb)  SpO2: 100 %  Physical Exam  General: Patient sitting up in bed in visible discomfort with breathing and movement  Cardiovascular: RRR with no murmurs  Respiratory: Lungs clear to auscultation bilaterally.  MSK: Patient has tenderness to palpation along the anterior shoulder, clavicle, and chest.  Mild tenderness to palpation along the cervical and lumbar spine.  Neurological: Adequate strength in all 4 extremities.  No evidence of dysmetria on finger-nose testing.    ED Course, Procedures, & Data     ED Course as of 07/07/25 1344   Mon Jul " 07, 2025   1326 EKG 12 lead   1329 POC US CHEST B-SCAN     Procedures          Results for orders placed or performed during the hospital encounter of 07/07/25   EKG 12 lead   Result Value Ref Range    Systolic Blood Pressure  mmHg    Diastolic Blood Pressure  mmHg    Ventricular Rate 85 BPM    Atrial Rate 85 BPM    OH Interval 168 ms    QRS Duration 90 ms     ms    QTc 447 ms    P Axis 65 degrees    R AXIS -58 degrees    T Axis -1 degrees    Interpretation ECG       Sinus rhythm  Left axis deviation  T wave abnormality, consider anterolateral ischemia  Abnormal ECG  Unconfirmed report - interpretation of this ECG is computer generated - see medical record for final interpretation    Confirmed by - EMERGENCY ROOM, PHYSICIAN ([id number]),  JASPREET JACKSON ([id number]) on 7/7/2025 12:58:20 PM       Medications - No data to display       Critical care was not performed.     Medical Decision Making  The patient's presentation was of high complexity (an acute health issue posing potential threat to life or bodily function).    The patient's evaluation involved:  ordering and/or review of 3+ test(s) in this encounter (see separate area of note for details)  independent interpretation of testing performed by another health professional (Head CT)    The patient's management necessitated moderate risk (prescription drug management including medications given in the ED).    Assessment & Plan    Radha Mcmanus is a 89 year old female who comes in due to a fall yesterday at 6 AM when getting up to use the bathroom.  Patient felt dizzy as she got up which caused her to fall onto her right side hitting her shoulder chest head and right hand onto the bathtub.  Since then she has had pain, and trouble breathing since this morning due to chest pain with breathing.  A CMP, magnesium, INR, troponin, CBC, and UA have been ordered.    Plan is to.he CT of the head cervical spine and chest.  Would also like to get a x-ray of the right  shoulder.      Point-of-care ultrasound showed sliding spine on both the left and right lungs.  Therefore is less likely that the patient has a pneumothorax.    --    ED Attending Physician Attestation    I Juanis Collins MD, cared for this patient with the Medical Student. I performed, or re-performed, the physical exam and medical decision-making. I have verified the accuracy of all the medical student findings and documentation above, and have edited as necessary.    Summary of HPI, PE, ED Course   Patient is a 89 year old female evaluated in the emergency department for dizziness and recent fall.  She is hemodynamically stable, afebrile and in no respiratory distress.  She does report that the episode of dizziness was associated with standing up quickly and head movement.  She does have a long documented history of vertigo and BPPV and reports that this feels very similar.  She did not have any presyncopal symptoms and reports otherwise feeling well.  She did have baseline labs that show no significant electrolyte abnormalities.  ECG shows normal sinus rhythm and troponin is 9.  She has no leukocytosis and normal hemoglobin.  She was placed in a cervical spine collar and completed CT imaging of the head, cervical spine, chest and x-ray of the right shoulder.  CT imaging of the chest shows a mildly displaced sternal fracture with small retrosternal hemorrhage.  She is neurovascularly intact on exam and has strong peripheral pulses with intact radial pulses bilaterally.  Pt does have a contrast allergy and initial CT non-con was completed.  She reports that she breaks out in diffuse hives with contrast administration.  X-ray of the shoulder shows no evidence of fracture.  Discussed with trauma and will transfer to the [place] ED for trauma evaluation, admission and possible pre-treatment for CTA with contrast.      Juanis Collins MD  Emergency Medicine       I have reviewed the nursing notes. I have  reviewed the findings, diagnosis, plan and need for follow up with the patient.    New Prescriptions    No medications on file       Final diagnoses:   Closed fracture of body of sternum, initial encounter   Fall, initial encounter     Lavon Montemayor MS4  Medical Student  [facility] EMERGENCY DEPARTMENT  7/7/2025     Juanis Collins MD  07/07/25 [id number]

## 2025-07-07 NOTE — CONSULTS
"Consult received for Vascular Access Team.  See LDA for details. For additional needs place \"Consult for Inpatient Vascular Access Care\"  [id number] order in EPIC.  "

## 2025-07-07 NOTE — H&P
[facility]    History and Physical / Consult note: Trauma Service     Date of Admission:  7/7/2025    Time of Admission/Consult Request (page/call): 2104    Time of my evaluation: 2110  Consulting services:  N/A    Assessment & Plan     Trauma mechanism: fall  Time/date of injury: 07/06 6am  Known Injuries:  Sternal fracture with associated small retrosternal hemorrhage  Other diagnoses   Clinically Significant Risk Factors Present on Admission                 # Drug Induced Platelet Defect: home medication list includes an antiplatelet medication                        Neuro/Pain/Psych:  # Acute pain   # Vertigo  - Scheduled: tylenol, PTA gabapentin  - Prn: tizanidine, oxycodone, dilaudid IV    Pulmonary:  - Supplemental oxygen to keep saturation above 92 %.  - Incentive spirometer while awake     Cardiovascular:    # Sternal fracture  # Retrosternal hemorrhage  - Tele  - repeat CT chest with contrast, premedication  # Hypertension   - Monitor hemodynamic status.   - Hold PTA amlodipine    GI/Nutrition:      - Regular diet    Renal/ Fluids/Electrolytes:  - No IVF    Endocrine  #Hypothyroidism  - Continue PTA synthroid     Infectious disease:   - No indications for antibiotics.       Hematology:    - Hgb 13.6. Monitor and trend.   - Threshold for transfusion if hgb <7.0 or signs/symptoms of hypoperfusion.       # DVT Prophylaxis: hold chem ppx in setting of retrosternal bleed    Musculoskeletal:  #Age-related osteoporosis  #Vitamin D Deficiency  - Hold PTA fosamax, vitamin D  - Physical and occupational therapy consults.    Skin:  - dilgent cares to prevent skin breakdown and wound formation.      Code status: FULL CODE confirmed with patient and son.     General Cares:  GI Prophylaxis: N/A  DVT Prophylaxis: SCDs, hold chem ppx  Date of last stool/Bowel Regimen:PTA  Pulmonary toilet:IS    ETOH: This patient was asked if in the last 3-6 months there has been a time when  she had 4 or more drinks in a single day/outing.. Patient answer to the screening question was in the negative. No intervention needed.  Primary Care Physician   Anjelica Kapadia    Chief Complaint   Fall    History is obtained from the patient and her son, who served as an     History of Present Illness   Radha Mcmanus is a 89 year old female who presents with chest, shoulder, and neck pain after a fall on the side of the bathtub. Immediately prior, the room was spinning and then she lost her balance and fell. Hit her head but denies LOC. Fell along her right side. Had nausea and vomited, but nausea has resolved.    Past Medical History    I have reviewed this patient's medical history and updated it with pertinent information if needed.   Past Medical History:   Diagnosis Date    Hypertension     Ulcer        Past Surgical History   I have reviewed this patient's surgical history and updated it with pertinent information if needed.  Past Surgical History:   Procedure Laterality Date    ORTHOPEDIC SURGERY       Prior to Admission Medications   Prior to Admission Medications   Prescriptions Last Dose Informant Patient Reported? Taking?   Calcium Citrate-Vitamin D 250-200 MG-UNIT TABS   Yes No   Sig: Take 1 tablet by mouth   ORDER FOR DME   No No   Sig: Equipment being ordered: Long handled shoe horn and reacher grabber   acetaminophen (TYLENOL) 500 MG tablet   No No   Sig: Take 2 tablets (1,000 mg) by mouth 3 times daily.   alendronate (FOSAMAX) 70 MG tablet   No No   Sig: TAKE 1 TABLET BY MOUTH EVERY 7 DAYS WITH LARGE GLASS OF WATER. DO NOT LIE DOWN FOR 2HRS AFTER.   amLODIPine (NORVASC) 5 MG tablet 7/7/2025  No Yes   Sig: TAKE 1 TABLET BY MOUTH EVERY DAY   aspirin (ASA) 81 MG EC tablet   Yes No   Sig: Take 81 mg by mouth   diclofenac (VOLTAREN) 1 % topical gel   No No   Sig: Apply 2-4 grams to area up to four times daily using enclosed dosing card.   fluticasone (FLONASE) 50 MCG/ACT nasal spray   Yes No    Patient not taking: Reported on 2025   gabapentin (NEURONTIN) 100 MG capsule   No No   Si-2 caps at bed time as needed for pain   levothyroxine (SYNTHROID/LEVOTHROID) 25 MCG tablet 2025  No Yes   Sig: TAKE 1 TAB BY MOUTH DAILY WITH A GLASS OF WATER. SEPARATE FROM FOOD, COFFEE, MEDICATIONS AND SUPPLEMENTS BY 2 HOURS.   meclizine (ANTIVERT) 25 MG tablet   No No   Sig: Take 1 tablet (25 mg) by mouth nightly as needed for dizziness.   Patient not taking: Reported on 2025   moxifloxacin (VIGAMOX) 0.5 % ophthalmic solution   No No   Sig: Place 1 drop Into the left eye 4 times daily.   olopatadine (PATADAY) 0.2 % ophthalmic solution   No No   Sig: PLACE 1 DROP INTO BOTH EYES DAILY   Patient not taking: Reported on 2025   omeprazole (PRILOSEC) 20 MG DR capsule   No No   Sig: TAKE 1 CAPSULE BY MOUTH EVERY DAY   ondansetron (ZOFRAN ODT) 4 MG ODT tab   No No   Sig: Take 1 tablet (4 mg) by mouth every 8 hours as needed for nausea.   Patient not taking: Reported on 2025   polyethylene glycol (MIRALAX) 17 g packet   No No   Sig: Take 17 g by mouth daily   prednisoLONE acetate (PRED FORTE) 1 % ophthalmic suspension   No No   Sig: Place 1-2 drops Into the left eye 2 times daily.   tiZANidine (ZANAFLEX) 2 MG tablet   No No   Sig: Take 1 tablet (2 mg) by mouth 3 times daily as needed for muscle spasms.   topiramate (TOPAMAX) 25 MG tablet   No No   Sig: Take 1 tablet (25 mg) by mouth daily.   traMADol (ULTRAM) 50 MG tablet   No No   Sig: Take 1 tablet (50 mg) by mouth at bedtime.   triamcinolone (KENALOG) 0.1 % external cream   No No   Sig: Apply topically 2 times daily. Use 2 weeks on, 1 week off. May repeat.      Facility-Administered Medications: None     Allergies   Allergies   Allergen Reactions    Contrast Dye     Erythromycin        Social History   Social History     Socioeconomic History    Marital status: Single     Spouse name: Not on file    Number of children: Not on file    Years of  education: Not on file    Highest education level: Not on file   Occupational History    Not on file   Tobacco Use    Smoking status: Never    Smokeless tobacco: Never   Vaping Use    Vaping status: Never Used   Substance and Sexual Activity    Alcohol use: Never    Drug use: Never    Sexual activity: Not Currently   Other Topics Concern    Not on file   Social History Narrative    Patient lives by herself but does have PCA who helps with cleaning and cooking.  She does have 2 sons in the area.  She is originally from [place].     Social Drivers of Health     Financial Resource Strain: Not on file   Food Insecurity: Not on file   Transportation Needs: Not on file   Physical Activity: Not on file   Stress: Not on file   Social Connections: Not on file   Interpersonal Safety: Not on file   Housing Stability: Not on file       Family History   I have reviewed this patient's family history and updated it with pertinent information if needed.   Family History   Problem Relation Age of Onset    No Known Problems Mother     No Known Problems Father     Glaucoma No family hx of     Macular Degeneration No family hx of        Review of Systems   CONSTITUTIONAL: No fever, chills, sweats, fatigue   EYES: no visual blurring, no double vision or visual loss  ENT: no decrease in hearing, no tinnitus, no vertigo, no hoarseness  RESPIRATORY: no shortness of breath, no cough, no sputum   CARDIOVASCULAR: no palpitations, no chest  pain, no exertional chest pain or pressure  GASTROINTESTINAL: no nausea or vomiting, or abd pain  GENITOURINARY: no dysuria, no frequency or hesitancy, no hematuria  MUSCULOSKELETAL: no weakness, no redness, no swelling, no joint pain,   SKIN: no rashes, ecchymoses, abrasions or lacerations  NEUROLOGIC: no numbness or tingling of hands, no numbness or tingling  of feet, no syncope, no tremors or weakness  PSYCHIATRIC: no sleep disturbances, no anxiety or depression    Physical Exam   Temp: 98.7  F (37.1  C)  Temp src: Oral BP: 134/81 Pulse: 86   Resp: 16 SpO2: 100 % O2 Device: None (Room air)    Vital Signs with Ranges  Temp:  [98.7  F (37.1  C)] 98.7  F (37.1  C)  Pulse:  [86] 86  Resp:  [16] 16  BP: (134)/(81) 134/81  SpO2:  [100 %] 100 % 136 lbs 0 oz    Primary Survey:  Airway: patient talking  Breathing: symmetric respiratory effort bilaterally  Circulation: central pulses present and peripheral pulses present  Disability: Pupils - left 4 mm and brisk, right 4 mm and brisk   [place] Coma Scale - Total 15/15  Eye Response (E): 4  4= spontaneous,  3= to verbal/voice, 2=  to pain, 1= No response   Verbal Response (V): 5   5= Orientated, converses,  4= Confused, converses, 3= Inappropriate words,  2= Incomprehensible sounds,  1=No response   Motor Response (M): 6   6= Obeys commands, 5= Localizes to pain, 4= Withdrawal to pain, 3=Fexion to pain, 2= Extension to pain, 1= No response    Secondary Survey:  General: alert, oriented to person, place, time  Neuro: PERRLA. EOMI. CN II-XII grossly intact. No focal deficits. Strength 5/5 x 4 extremities.  Sensation intact.  Head: atraumatic, normocephalic, trachea midline  Eyes:  Pupils 2mm, EOMI, corneas cloudy  Ears: non-inflamed external ear canals  Nose: nares patent, no drainage, nasal septum non-tender  Mouth/Throat: no exudates or erythema,  no dental tenderness or malocclusions, no tongue lacerations, poor dentition  Neck:  tenderness along posterior neck muscles paramidline, No midline posterior tenderness, full AROM without pain.   Chest/Pulmonary: normal respiratory rate and rhythm,  bilateral clear breath sounds, no wheezes, rales or rhonchi, no chest wall tenderness or deformities,   Cardiovascular: normal and regular rate and rhythm, no murmurs  Abdomen: soft, non-tender, no guarding, no rebound tenderness and no tenderness to palpation  : normal external genitalia, pelvis stable to lateral compression  Musculoskel/Extremities: normal extremities, full AROM of  major joints without tenderness, edema, erythema, ecchymosis, or abrasions. No edema.   Back/Spine: no deformity, no midline tenderness, no sacral tenderness, no step-offs and no abrasions or contusions  Hands: no gross deformities of hands or fingers. Full AROM of hand and fingers in flexion and extension.  strength equal and symmetric.   Psychiatric: affect/mood normal, cooperative, normal judgement/insight and memory intact  Skin: no rashes, laceration, ecchymosis, skin warm and dry.     Recent Results (from the past 24 hours)   EKG 12 lead   Result Value Ref Range    Systolic Blood Pressure  mmHg    Diastolic Blood Pressure  mmHg    Ventricular Rate 85 BPM    Atrial Rate 85 BPM    NM Interval 168 ms    QRS Duration 90 ms     ms    QTc 447 ms    P Axis 65 degrees    R AXIS -58 degrees    T Axis -1 degrees    Interpretation ECG       Sinus rhythm  Left axis deviation  T wave abnormality, consider anterolateral ischemia  Abnormal ECG  Unconfirmed report - interpretation of this ECG is computer generated - see medical record for final interpretation    Confirmed by - EMERGENCY ROOM, PHYSICIAN ([id number]),  JASPREET JACKSON ([id number]) on 7/7/2025 12:58:20 PM     POC US CHEST B-SCAN    Impression    Limited Bedside Lung Ultrasound, performed and interpreted by me.   Indication: Chest pain    With the patient positioned supine, bilateral anterior and lateral lung fields were examined for evidence of thoracic free fluid, pulmonary consolidation, and pulmonary edema.       Findings: All lung fields showed A-lines consisting with normal lung artifact. No free fluid.     IMPRESSION: Normal lung ultrasound without evidence of pneumothorax      CBC with platelets differential    Narrative    The following orders were created for panel order CBC with platelets differential.  Procedure                               Abnormality         Status                     ---------                               -----------          ------                     CBC with platelets and ...[[id number]]                      Final result                 Please view results for these tests on the individual orders.   INR   Result Value Ref Range    INR 1.01 0.85 - 1.15    PT 13.7 11.8 - 14.8 Seconds   Comprehensive metabolic panel   Result Value Ref Range    Sodium 137 135 - 145 mmol/L    Potassium 3.4 3.4 - 5.3 mmol/L    Carbon Dioxide (CO2) 26 22 - 29 mmol/L    Anion Gap 13 7 - 15 mmol/L    Urea Nitrogen 10.6 8.0 - 23.0 mg/dL    Creatinine 0.73 0.51 - 0.95 mg/dL    GFR Estimate 76 >60 mL/min/1.73m2    Calcium 9.0 8.8 - 10.4 mg/dL    Chloride 98 98 - 107 mmol/L    Glucose 99 70 - 99 mg/dL    Alkaline Phosphatase 76 40 - 150 U/L    AST 26 0 - 45 U/L    ALT 14 0 - 50 U/L    Protein Total 7.8 6.4 - 8.3 g/dL    Albumin 3.9 3.5 - 5.2 g/dL    Bilirubin Total 0.7 <=1.2 mg/dL   Troponin T, High Sensitivity   Result Value Ref Range    Troponin T, High Sensitivity 9 <=14 ng/L   Magnesium   Result Value Ref Range    Magnesium 2.0 1.7 - 2.3 mg/dL   CBC with platelets and differential   Result Value Ref Range    WBC Count 5.4 4.0 - 11.0 10e3/uL    RBC Count 4.62 3.80 - 5.20 10e6/uL    Hemoglobin 13.6 11.7 - 15.7 g/dL    Hematocrit 40.1 35.0 - 47.0 %    MCV 87 78 - 100 fL    MCH 29.4 26.5 - 33.0 pg    MCHC 33.9 31.5 - 36.5 g/dL    RDW 13.2 10.0 - 15.0 %    Platelet Count 163 150 - 450 10e3/uL    % Neutrophils 52 %    % Lymphocytes 33 %    % Monocytes 10 %    % Eosinophils 5 %    % Basophils 1 %    % Immature Granulocytes 0 %    NRBCs per 100 WBC 0 <1 /100    Absolute Neutrophils 2.8 1.6 - 8.3 10e3/uL    Absolute Lymphocytes 1.7 0.8 - 5.3 10e3/uL    Absolute Monocytes 0.5 0.0 - 1.3 10e3/uL    Absolute Eosinophils 0.3 0.0 - 0.7 10e3/uL    Absolute Basophils 0.0 0.0 - 0.2 10e3/uL    Absolute Immature Granulocytes 0.0 <=0.4 10e3/uL    Absolute NRBCs 0.0 10e3/uL   Chest CT w/o contrast    Narrative    EXAM: CT CHEST W/O CONTRAST  LOCATION: [facility]  [facility]  DATE: 7/7/2025    INDICATION: Right-sided chest pain, status post fall.  COMPARISON: 7/6/2017  TECHNIQUE: CT chest without IV contrast. Multiplanar reformats were obtained. Dose reduction techniques were used.  CONTRAST: None.    FINDINGS:   LUNGS AND PLEURA: Bilateral cylindrical bronchiectasis most pronounced in the lung bases with varicoid changes in the lingula and lateral segment left lower lobe. Increased bilateral endobronchial secretions and subpleural clustered nodules. A few   calcified granulomata. No pneumothorax nor pleural effusion. Central airways are patent.    MEDIASTINUM/AXILLAE: Mild retrosternal hemorrhage adjacent to the sternal manubrial fracture. No adenopathy nor pericardial effusion. The thoracic aorta and heart are normal in size.    CORONARY ARTERY CALCIFICATION: Mild.    UPPER ABDOMEN: Simple left renal cortical cyst does not require imaging follow-up.    MUSCULOSKELETAL: Minimally displaced oblique fracture through the right sternal manubrium with associated small retrosternal hemorrhage. No evidence of displaced rib fracture.      Impression    IMPRESSION:   1.  Minimally displaced oblique fracture through the right sternal manubrium with associated small retrosternal hemorrhage.  2.  No definite right-sided rib fracture.  3.  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     CT Cervical Spine w/o Contrast    Narrative    EXAM: CT CERVICAL SPINE W/O CONTRAST, CT HEAD W/O CONTRAST  LOCATION: [facility]  DATE: 07/07/2025    INDICATION: Neck pain status post fall, trauma.  COMPARISON: CT of the head dated 12/12/2024. Cervical spine radiographs 02/13/2023.  TECHNIQUE:   1) Routine CT Head without IV contrast. Multiplanar reformats. Dose reduction techniques were used.  2) Routine CT Cervical Spine without IV contrast.  Multiplanar reformats. Dose reduction techniques were used.    FINDINGS:     HEAD CT:   INTRACRANIAL CONTENTS: No intracranial hemorrhage, extra-axial collection, or mass effect. No CT evidence of acute infarct. Moderate presumed chronic small vessel ischemic changes. Mild generalized volume loss. No hydrocephalus. Atherosclerotic   calcifications of the carotid siphons.    VISUALIZED ORBITS/SINUSES/MASTOIDS: No intraorbital abnormality. No paranasal sinus mucosal disease. No middle ear or mastoid effusion.    BONES/SOFT TISSUES: No acute abnormality.      CERVICAL SPINE CT:   VERTEBRA: No definite acute cervical spinal fracture is identified. Normal vertebral body heights. There appears to be trace anterolisthesis of C4 on C5. Alignment otherwise appears within normal limits.    CANAL/FORAMINA: Mild to moderate scattered degenerative changes of the cervical spine without definite findings of high-grade central spinal canal or bony neural foraminal stenosis.    PARASPINAL: There appears to be some scarring of the lung apices bilaterally. The visualized paravertebral soft tissues otherwise appear grossly unremarkable, accounting for technique.        Impression    IMPRESSION:  HEAD CT:  1.  No CT evidence for acute intracranial process.  2.  Brain atrophy and presumed chronic microvascular ischemic changes as above.    CERVICAL SPINE CT:  1.  No acute cervical spine fracture.  2. Degenerative changes, as described.   CT Head w/o Contrast    Narrative    EXAM: CT CERVICAL SPINE W/O CONTRAST, CT HEAD W/O CONTRAST  LOCATION: [facility]  DATE: 07/07/2025    INDICATION: Neck pain status post fall, trauma.  COMPARISON: CT of the head dated 12/12/2024. Cervical spine radiographs 02/13/2023.  TECHNIQUE:   1) Routine CT Head without IV contrast. Multiplanar reformats. Dose reduction techniques were used.  2) Routine CT Cervical Spine without IV contrast. Multiplanar reformats.  Dose reduction techniques were used.    FINDINGS:     HEAD CT:   INTRACRANIAL CONTENTS: No intracranial hemorrhage, extra-axial collection, or mass effect. No CT evidence of acute infarct. Moderate presumed chronic small vessel ischemic changes. Mild generalized volume loss. No hydrocephalus. Atherosclerotic   calcifications of the carotid siphons.    VISUALIZED ORBITS/SINUSES/MASTOIDS: No intraorbital abnormality. No paranasal sinus mucosal disease. No middle ear or mastoid effusion.    BONES/SOFT TISSUES: No acute abnormality.      CERVICAL SPINE CT:   VERTEBRA: No definite acute cervical spinal fracture is identified. Normal vertebral body heights. There appears to be trace anterolisthesis of C4 on C5. Alignment otherwise appears within normal limits.    CANAL/FORAMINA: Mild to moderate scattered degenerative changes of the cervical spine without definite findings of high-grade central spinal canal or bony neural foraminal stenosis.    PARASPINAL: There appears to be some scarring of the lung apices bilaterally. The visualized paravertebral soft tissues otherwise appear grossly unremarkable, accounting for technique.        Impression    IMPRESSION:  HEAD CT:  1.  No CT evidence for acute intracranial process.  2.  Brain atrophy and presumed chronic microvascular ischemic changes as above.    CERVICAL SPINE CT:  1.  No acute cervical spine fracture.  2. Degenerative changes, as described.   XR Shoulder Right G/E 3 Views    Narrative    EXAM: XR SHOULDER RIGHT G/E 3 VIEWS  LOCATION: [facility]  DATE: 7/7/2025    INDICATION: right shoulder pain s p fall  COMPARISON: None.      Impression    IMPRESSION: No acute fracture or malalignment. Mild glenohumeral and moderate acromioclavicular joint degenerative changes. Osteopenia.   UA with Microscopic reflex to Culture    Specimen: Urine, Clean Catch   Result Value Ref Range    Color Urine Light Yellow Colorless, Straw, Light  Yellow, Yellow    Appearance Urine Clear Clear    Glucose Urine Negative Negative mg/dL    Bilirubin Urine Negative Negative    Ketones Urine Negative Negative mg/dL    Specific Gravity Urine 1.010 1.003 - 1.035    Blood Urine Trace (A) Negative    pH Urine 7.0 5.0 - 7.0    Protein Albumin Urine Negative Negative mg/dL    Urobilinogen Urine Normal Normal mg/dL    Nitrite Urine Negative Negative    Leukocyte Esterase Urine Negative Negative    Mucus Urine Present (A) None Seen /LPF    RBC Urine 1 <=2 /HPF    WBC Urine 1 <=5 /HPF    Squamous Epithelials Urine <1 <=1 /HPF    Narrative    Urine Culture not indicated   Troponin T, High Sensitivity   Result Value Ref Range    Troponin T, High Sensitivity 8 <=14 ng/L       Studies:  XR Shoulder Right G/E 3 Views   Final Result   IMPRESSION: No acute fracture or malalignment. Mild glenohumeral and moderate acromioclavicular joint degenerative changes. Osteopenia.      CT Head w/o Contrast   Final Result   IMPRESSION:   HEAD CT:   1.  No CT evidence for acute intracranial process.   2.  Brain atrophy and presumed chronic microvascular ischemic changes as above.      CERVICAL SPINE CT:   1.  No acute cervical spine fracture.   2. Degenerative changes, as described.      CT Cervical Spine w/o Contrast   Final Result   IMPRESSION:   HEAD CT:   1.  No CT evidence for acute intracranial process.   2.  Brain atrophy and presumed chronic microvascular ischemic changes as above.      CERVICAL SPINE CT:   1.  No acute cervical spine fracture.   2. Degenerative changes, as described.      Chest CT w/o contrast   Final Result   IMPRESSION:    1.  Minimally displaced oblique fracture through the right sternal manubrium with associated small retrosternal hemorrhage.   2.  No definite right-sided rib fracture.   3.  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         POC US CHEST B-SCAN   Final  Result   Limited Bedside Lung Ultrasound, performed and interpreted by me.    Indication: Chest pain      With the patient positioned supine, bilateral anterior and lateral lung fields were examined for evidence of thoracic free fluid, pulmonary consolidation, and pulmonary edema.          Findings: All lung fields showed A-lines consisting with normal lung artifact. No free fluid.       IMPRESSION: Normal lung ultrasound without evidence of pneumothorax              Nataliia Lazaro MD

## 2025-07-07 NOTE — ED TRIAGE NOTES
Patient was in bathroom and felt dizzy yesterday at 6AM. She fell and hit her right shoulder, chest, head on the bath tub. No LOC. She is on baby aspirin otherwise no blood thinners. She has felt dizzy before but never like this and she has never fallen from it.   She is only dizzy when she stands.      Triage Assessment (Adult)       Row Name 07/07/25 1241          Triage Assessment    Airway WDL WDL        Respiratory WDL    Respiratory WDL WDL        Skin Circulation/Temperature WDL    Skin Circulation/Temperature WDL WDL        Cardiac WDL    Cardiac WDL X  dizziness        Peripheral/Neurovascular WDL    Peripheral Neurovascular WDL WDL        Cognitive/Neuro/Behavioral WDL    Cognitive/Neuro/Behavioral WDL WDL

## 2025-07-07 NOTE — TELEPHONE ENCOUNTER
S-(situation): fall and dizziness/spinning of room    B-(background): Patient fell 7/25 in the shower due to dizziness. She landed on the wall on the R side.     A-(assessment):Patient endorsing R side shoulder and collarbone pain from where she fell. Denies that she hit her head. She needed assistance getting back up. Patient endorsing spinning/tilting of the room. Present when she attempts to move or sit up. She is afraid to stand and needs assistance standing. She rates her pain as 8-9/10. BP this morning was 142/78.     Denies new weakness, open cuts or bruising, weakness on one side of body. Son denies that patient is confused and states she is acting normal.    R-(recommendations): RN recommended ER for evaluation. Reviewed care advice under care tab with patient.  Patient was given an opportunity to ask questions, verbalized understanding of plan, and is agreeable.  They will take patient to [place] [facility].     Jordny HILL RN on 7/7/2025 at 11:21 AM         Reason for Disposition   Dizziness described as spinning or off balance (vertigo) AND new-onset or getting worse   Spinning or tilting sensation (vertigo) present now and one or more stroke risk factors (i.e., hypertension, diabetes mellitus, prior stroke/TIA, heart attack, age over 60) (Exception: Prior physician evaluation for this AND no different/worse than usual.)   SEVERE dizziness (e.g., unable to stand, requires support to walk, feels like passing out now)    Additional Information   Negative: Major injury from dangerous force (e.g., fall > 10 feet or 3 meters)   Negative: Major bleeding (e.g., actively dripping or spurting) and can't be stopped   Negative: Shock suspected (e.g., cold/pale/clammy skin, too weak to stand)   Negative: Difficult to awaken or acting confused (e.g., disoriented, slurred speech)   Negative: SEVERE weakness (e.g., unable to walk or barely able to walk, requires support) and new-onset or getting worse   Negative: Can't  "stand (bear weight) or walk and new-onset after fall   Negative: Sounds like a life-threatening emergency to the triager   Negative: Passed out (e.g., fainted, lost consciousness, blacked out and was not responding)   Negative: Weakness of the face, arm or leg on one side of the body AND new-onset or getting worse   Negative: SEVERE difficulty breathing (e.g., struggling for each breath, speaks in single words)   Negative: Shock suspected (e.g., cold/pale/clammy skin, too weak to stand, low BP, rapid pulse)   Negative: Difficult to awaken or acting confused (e.g., disoriented, slurred speech)   Negative: Fainted, and still feels dizzy afterwards   Negative: Overdose (accidental or intentional) of medications   Negative: New neurologic deficit that is present now: * Weakness of the face, arm, or leg on one side of the body * Numbness of the face, arm, or leg on one side of the body * Loss of speech or garbled speech   Negative: Heart beating < 50 beats per minute OR > 140 beats per minute   Negative: Sounds like a life-threatening emergency to the triager   Negative: Chest pain   Negative: Rectal bleeding, bloody stool, or tarry-black stool   Negative: Vomiting is main symptom   Negative: Diarrhea is main symptom   Negative: Headache is main symptom   Negative: Heat exhaustion suspected (i.e., dehydration from heat exposure)   Negative: Patient states that they are having an anxiety or panic attack   Negative: Dizziness from low blood sugar (i.e., < 60 mg/dl or 3.5 mmol/l)    Answer Assessment - Initial Assessment Questions  1. MECHANISM: \"How did the fall happen?\"      Lost balance while in shower and fall on Right side to the wall.    3. ONSET: \"When did the fall happen?\" (e.g., minutes, hours, or days ago)      Lost balacne in shower  4. LOCATION: \"What part of the body hit the ground?\" (e.g., back, buttocks, head, hips, knees, hands, head, stomach)      R shoulder into wall   5. INJURY: \"Did you hurt (injure) " "yourself when you fell?\" If Yes, ask: \"What did you injure? Tell me more about this?\" (e.g., body area; type of injury; pain severity)\"      Having pain.   - dizziness when she sits up or tries to walk   6. PAIN: \"Is there any pain?\" If Yes, ask: \"How bad is the pain?\" (e.g., Scale 0-10; or none, mild,       Pain on shoulder and collarbone? Yes - 8-9/10.   7. SIZE: For cuts, bruises, or swelling, ask: \"How large is it?\" (e.g., inches or centimeters)       no  8. PREGNANCY: \"Is there any chance you are pregnant?\" \"When was your last menstrual period?\"      no  9. OTHER SYMPTOMS: \"Do you have any other symptoms?\" (e.g., dizziness, fever, weakness; new-onset or worsening).       Dizziness,   10. CAUSE: \"What do you think caused the fall (or falling)?\" (e.g., dizzy spell, tripped)        She lost her balance.    Protocols used: Falls and [id number], Dizziness-A-OH    "

## 2025-07-08 ENCOUNTER — APPOINTMENT (OUTPATIENT)
Dept: CT IMAGING | Facility: CLINIC | Age: OVER 89
End: 2025-07-08
Attending: STUDENT IN AN ORGANIZED HEALTH CARE EDUCATION/TRAINING PROGRAM
Payer: COMMERCIAL

## 2025-07-08 ENCOUNTER — APPOINTMENT (OUTPATIENT)
Dept: PHYSICAL THERAPY | Facility: CLINIC | Age: OVER 89
End: 2025-07-08
Payer: COMMERCIAL

## 2025-07-08 ENCOUNTER — APPOINTMENT (OUTPATIENT)
Dept: INTERPRETER SERVICES | Facility: CLINIC | Age: OVER 89
End: 2025-07-08
Payer: COMMERCIAL

## 2025-07-08 ENCOUNTER — PATIENT OUTREACH (OUTPATIENT)
Dept: GERIATRIC MEDICINE | Facility: CLINIC | Age: OVER 89
End: 2025-07-08

## 2025-07-08 PROCEDURE — 250N000009 HC RX 250: Performed by: STUDENT IN AN ORGANIZED HEALTH CARE EDUCATION/TRAINING PROGRAM

## 2025-07-08 PROCEDURE — 97161 PT EVAL LOW COMPLEX 20 MIN: CPT | Mod: GP

## 2025-07-08 PROCEDURE — 250N000011 HC RX IP 250 OP 636: Mod: JZ | Performed by: EMERGENCY MEDICINE

## 2025-07-08 PROCEDURE — 96376 TX/PRO/DX INJ SAME DRUG ADON: CPT

## 2025-07-08 PROCEDURE — 97530 THERAPEUTIC ACTIVITIES: CPT | Mod: GP

## 2025-07-08 PROCEDURE — G0378 HOSPITAL OBSERVATION PER HR: HCPCS

## 2025-07-08 PROCEDURE — 71260 CT THORAX DX C+: CPT | Mod: 26 | Performed by: RADIOLOGY

## 2025-07-08 PROCEDURE — 97116 GAIT TRAINING THERAPY: CPT | Mod: GP

## 2025-07-08 PROCEDURE — 250N000013 HC RX MED GY IP 250 OP 250 PS 637: Performed by: STUDENT IN AN ORGANIZED HEALTH CARE EDUCATION/TRAINING PROGRAM

## 2025-07-08 PROCEDURE — 96375 TX/PRO/DX INJ NEW DRUG ADDON: CPT

## 2025-07-08 PROCEDURE — 250N000011 HC RX IP 250 OP 636: Performed by: STUDENT IN AN ORGANIZED HEALTH CARE EDUCATION/TRAINING PROGRAM

## 2025-07-08 PROCEDURE — 71260 CT THORAX DX C+: CPT

## 2025-07-08 PROCEDURE — 999N000111 HC STATISTIC OT IP EVAL DEFER

## 2025-07-08 RX ORDER — IOPAMIDOL 755 MG/ML
60 INJECTION, SOLUTION INTRAVASCULAR ONCE
Status: COMPLETED | OUTPATIENT
Start: 2025-07-08 | End: 2025-07-08

## 2025-07-08 RX ADMIN — DICLOFENAC SODIUM 4 G: 10 GEL TOPICAL at 16:44

## 2025-07-08 RX ADMIN — DICLOFENAC SODIUM 4 G: 10 GEL TOPICAL at 20:09

## 2025-07-08 RX ADMIN — DICLOFENAC SODIUM 4 G: 10 GEL TOPICAL at 08:41

## 2025-07-08 RX ADMIN — ASPIRIN 81 MG: 81 TABLET ORAL at 08:40

## 2025-07-08 RX ADMIN — PREDNISOLONE ACETATE 2 DROP: 10 SUSPENSION/ DROPS OPHTHALMIC at 08:39

## 2025-07-08 RX ADMIN — ACETAMINOPHEN 1000 MG: 500 TABLET ORAL at 20:07

## 2025-07-08 RX ADMIN — IOPAMIDOL 60 ML: 755 INJECTION, SOLUTION INTRAVENOUS at 07:11

## 2025-07-08 RX ADMIN — METHYLPREDNISOLONE SODIUM SUCCINATE 40 MG: 40 INJECTION, POWDER, FOR SOLUTION INTRAMUSCULAR; INTRAVENOUS at 04:52

## 2025-07-08 RX ADMIN — ACETAMINOPHEN 1000 MG: 500 TABLET ORAL at 08:40

## 2025-07-08 RX ADMIN — ACETAMINOPHEN 1000 MG: 500 TABLET ORAL at 13:43

## 2025-07-08 RX ADMIN — OXYCODONE HYDROCHLORIDE 5 MG: 5 TABLET ORAL at 01:34

## 2025-07-08 RX ADMIN — DIPHENHYDRAMINE HYDROCHLORIDE 50 MG: 50 INJECTION, SOLUTION INTRAMUSCULAR; INTRAVENOUS at 05:50

## 2025-07-08 RX ADMIN — GABAPENTIN 100 MG: 100 CAPSULE ORAL at 01:34

## 2025-07-08 RX ADMIN — LEVOTHYROXINE SODIUM 25 MCG: 0.03 TABLET ORAL at 08:41

## 2025-07-08 RX ADMIN — OXYCODONE HYDROCHLORIDE 5 MG: 5 TABLET ORAL at 16:53

## 2025-07-08 RX ADMIN — GABAPENTIN 100 MG: 100 CAPSULE ORAL at 22:22

## 2025-07-08 RX ADMIN — PREDNISOLONE ACETATE 1 DROP: 10 SUSPENSION/ DROPS OPHTHALMIC at 20:08

## 2025-07-08 RX ADMIN — SENNOSIDES AND DOCUSATE SODIUM 1 TABLET: 8.6; 5 TABLET ORAL at 20:07

## 2025-07-08 ASSESSMENT — ACTIVITIES OF DAILY LIVING (ADL)
ADLS_ACUITY_SCORE: 39
ADLS_ACUITY_SCORE: 38
ADLS_ACUITY_SCORE: 39
ADLS_ACUITY_SCORE: 44
ADLS_ACUITY_SCORE: 44
ADLS_ACUITY_SCORE: 39
ADLS_ACUITY_SCORE: 38
ADLS_ACUITY_SCORE: 38
ADLS_ACUITY_SCORE: 44
ADLS_ACUITY_SCORE: 44
ADLS_ACUITY_SCORE: 39
ADLS_ACUITY_SCORE: 44
ADLS_ACUITY_SCORE: 39

## 2025-07-08 NOTE — PROGRESS NOTES
PRIMARY DIAGNOSIS: TRAUMA MANAGEMENT/ FALL    OUTPATIENT/OBSERVATION GOALS TO BE MET BEFORE DISCHARGE    Trauma Cervical Spine Clearance    1.  Diagnostic testing complete: No      2.  Cervical spine cleared: Yes      3.  Cleared by Ortho/Neurosurgery: No      4.  Return to near baseline physical activity: No  Discharge Planner Nurse   Safe discharge environment identified: Yes  Barriers to discharge: No       Entered by: Jessica Richter RN 07/08/2025 7:36 AM     Please review provider order for any additional goals.  Nurse to notify provider when observation goals have been met and patient is ready for discharge.
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
## 2025-07-08 NOTE — PROGRESS NOTES
"PRIMARY DIAGNOSIS: TRAUMA MANAGEMENT    OUTPATIENT/OBSERVATION GOALS TO BE MET BEFORE DISCHARGE    Trauma Cervical Spine Clearance    1.  Diagnostic testing complete: No      2.  Cervical spine cleared: Yes      3.  Cleared by Ortho/Neurosurgery: No      4.  Return to near baseline physical activity: No  Discharge Planner Nurse   Safe discharge environment identified: Yes  Barriers to discharge: No       Entered by: Jessica Richter RN 07/08/2025 7:35 AM     Please review provider order for any additional goals.  Nurse to notify provider when observation goals have been met and patient is ready for discharge.     Blood pressure 134/77, pulse 96, temperature 97.4  F (36.3  C), temperature source Oral, resp. rate 16, height 1.676 m (5' 6\"), weight 60 kg (132 lb 4.4 oz), SpO2 98%, not currently breastfeeding.    "

## 2025-07-08 NOTE — ED NOTES
Bed: [facility]  Expected date:   Expected time:   Means of arrival:   Comments:  WB transfer sternal fracture

## 2025-07-08 NOTE — PROGRESS NOTES
OBS PT Evaluation:      07/08/25 1340   Appointment Info   Signing Clinician's Name / Credentials (PT) Dylan Sabillon, PT, DPT   Quick Adds   Quick Adds Certification;Vestibular Eval       Present yes   Language Iranian   Living Environment   People in Home other (see comments)   Current Living Arrangements apartment   Home Accessibility no concerns   Transportation Anticipated family or friend will provide   Living Environment Comments Lives in an apt with 24/7 family support   Self-Care   Equipment Currently Used at Home none   Fall history within last six months yes   Number of times patient has fallen within last six months 1   Activity/Exercise/Self-Care Comment IND with mobility at baseline. Family assists with I/ADLs prn.   General Information   Onset of Illness/Injury or Date of Surgery 07/07/25   Referring Physician Eder Bonilla MD   Patient/Family Therapy Goals Statement (PT) Return home   Pertinent History of Current Problem (include personal factors and/or comorbidities that impact the POC) Per EMR: Radha Mcmanus is a 89 year old female who presented with chest, shoulder, and neck pain after a fall on the side of the bathtub, resulting in a R manubrium fracture with small retrosternal hematoma.   Existing Precautions/Restrictions fall   General Observations Pt supine with their son at bedside, agreeable to session.   Cognition   Affect/Mental Status (Cognition) WFL   Pain Assessment   Patient Currently in Pain   (chest, B shoulder, and neck pain from fall)   Posture    Posture Forward head position;Protracted shoulders   Range of Motion (ROM)   Range of Motion ROM deficits secondary to pain   ROM Comment Limited neck and B shoulder ROM due to pain   Strength (Manual Muscle Testing)   Strength (Manual Muscle Testing) Deficits observed during functional mobility   Strength Comments Grossly deconditioned   Bed Mobility   Bed Mobility supine-sit   Supine-Sit [place] (Bed Mobility)  minimum assist (75% patient effort)   Assistive Device (Bed Mobility) bed rails   Transfers   Transfers sit-stand transfer   Sit-Stand Transfer   Sit-Stand [place] (Transfers) minimum assist (75% patient effort)   Comment, (Sit-Stand Transfer) HHA   Gait/Stairs (Locomotion)   Gove Level (Gait) contact guard   Balance   Balance Comments slow, guarded gait with shuffling steps   Infrared Goggle Exam or Frenzel Lense Exam   Exam completed with Room Light  (Initially with room light, further exam completed with frenzel goggles)   Spontaneous Nystagmus Negative   Gaze Evoked Nystagmus Negative   Positional Testing Comments Initially downbeating L torsional with L jonathan hallpike in room light. Retesting with frenzel goggles negative in all positions and without symptoms/nystagmus   [place]-Hallpike (Left) Downbeating L torsional   HSCC Supine Roll Test (Right) Negative   HSCC Supine Roll Test (Left) Negative   Clinical Impression   Criteria for Skilled Therapeutic Intervention Yes, treatment indicated   PT Diagnosis (PT) Impaired functional mobility   Influenced by the following impairments pain, deconditioning   Functional limitations due to impairments bed mobility, transfers, gait, balance, activity tolerance, self cares   Clinical Presentation (PT Evaluation Complexity) stable   Clinical Presentation Rationale Clinical judgment   Clinical Decision Making (Complexity) low complexity   Planned Therapy Interventions (PT) balance training;bed mobility training;gait training;home exercise program;patient/family education;transfer training;progressive activity/exercise;risk factor education;home program guidelines;ROM (range of motion);strengthening;stretching   Risk & Benefits of therapy have been explained evaluation/treatment results reviewed;care plan/treatment goals reviewed;risks/benefits reviewed;current/potential barriers reviewed;participants voiced agreement with care plan;participants included;patient;son    PT Total Evaluation Time   PT Eval, Low Complexity Minutes (86613) 6   Therapy Certification   Start of care date 07/08/25   Certification date from 07/08/25   Certification date to 07/22/25   Medical Diagnosis Sternal fracture with associated small retrosternal hemorrhage   Physical Therapy Goals   PT Frequency Daily   PT Predicted Duration/Target Date for Goal Attainment 07/22/25   PT Goals Bed Mobility;Transfers;Gait   PT: Bed Mobility Independent;Supine to/from sit   PT: Transfers Independent;Sit to/from stand   PT: Gait Independent;150 feet   PT Discharge Planning   PT Plan vestibular exam as needed; gait without AD, dymaic balance, review neck and B shoulder HEP   PT Discharge Recommendation (DC Rec) home with assist   PT Rationale for DC Rec Patient's mobility limited by pain, deconditioning and impaired balance. Currently at risk for falls though anticipate steady progress to enable discharge home with family support. Will plan to max efforts in house.   PT Brief overview of current status CGA with GB; encourage up to chair and walks with staff as tolerated   PT Total Distance Amb During Session (feet) 100   Physical Therapy Time and Intention   Total Session Time (sum of timed and untimed services) [address] [place] [address]                                                                                   OUTPATIENT PHYSICAL THERAPY    PLAN OF TREATMENT FOR OUTPATIENT REHABILITATION   Patient's Last Name, First Name, TRAERadha Slaughter YOB: 1931   Provider's [facility]   [facility]   Medical Record No.  [id number]     Onset Date: 07/07/25 Start of Care Date: 07/08/25     Medical Diagnosis:  (P) Sternal fracture with associated small retrosternal hemorrhage               PT Diagnosis:  Impaired functional mobility Certification Dates:  From: 07/08/25  To: 07/22/25       See note for plan of treatment, functional goals, and certification  details.    I CERTIFY THE NEED FOR THESE SERVICES FURNISHED UNDER        THIS PLAN OF TREATMENT AND WHILE UNDER MY CARE (Physician co-signature of this document indicates review and certification of the therapy plan).

## 2025-07-08 NOTE — PROGRESS NOTES
Trauma Progress Note  07/08/2025       Subjective:  NAEON. Pain well-controlled. Denies nausea or vomiting. Tolerating regular diet.     Objective:  Temp:  [97.4  F (36.3  C)-98  F (36.7  C)] 97.4  F (36.3  C)  Pulse:  [84-96] 92  Resp:  [16-18] 16  BP: (129-167)/(77-89) 129/79  SpO2:  [97 %-98 %] 97 %    No intake/output data recorded.      Gen: Awake, alert, NAD  Resp: NLB on RA  Abd: soft, nondistended, nontender  Ext: WWP, no edema     Labs:  Recent Labs   Lab 07/07/25  1401   WBC 5.4   HGB 13.6          Recent Labs   Lab 07/07/25  1401      POTASSIUM 3.4   CHLORIDE 98   CO2 26   BUN 10.6   CR 0.73   GLC 99   WILLIAM 9.0   MAG 2.0       Imaging:  Results for orders placed or performed during the hospital encounter of 07/07/25   CT Head w/o Contrast    Impression    IMPRESSION:  HEAD CT:  1.  No CT evidence for acute intracranial process.  2.  Brain atrophy and presumed chronic microvascular ischemic changes as above.    CERVICAL SPINE CT:  1.  No acute cervical spine fracture.  2. Degenerative changes, as described.   CT Cervical Spine w/o Contrast    Impression    IMPRESSION:  HEAD CT:  1.  No CT evidence for acute intracranial process.  2.  Brain atrophy and presumed chronic microvascular ischemic changes as above.    CERVICAL SPINE CT:  1.  No acute cervical spine fracture.  2. Degenerative changes, as described.   Chest CT w/o contrast    Impression    IMPRESSION:   1.  Minimally displaced oblique fracture through the right sternal manubrium with associated small retrosternal hemorrhage.  2.  No definite right-sided rib fracture.  3.  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     XR Shoulder Right G/E 3 Views    Impression    IMPRESSION: No acute fracture or malalignment. Mild glenohumeral and moderate acromioclavicular joint degenerative changes. Osteopenia.   POC US CHEST B-SCAN    Impression     Limited Bedside Lung Ultrasound, performed and interpreted by me.   Indication: Chest pain    With the patient positioned supine, bilateral anterior and lateral lung fields were examined for evidence of thoracic free fluid, pulmonary consolidation, and pulmonary edema.       Findings: All lung fields showed A-lines consisting with normal lung artifact. No free fluid.     IMPRESSION: Normal lung ultrasound without evidence of pneumothorax      Chest CT w IV contrast only, TRAUMA / DISSECTION    Impression    IMPRESSION:   1. Stable nondisplaced fracture of the manubrium with unchanged small  retrosternal hemorrhage.   2. Unchanged cystic bronchiectasis and scattered subpleural nodules,  nonspecific but possibly suggestive of chronic atypical infection.    I have personally reviewed the examination and initial interpretation  and I agree with the findings.    MARAH ARREDONDO MD         SYSTEM ID:  [id number]        Assessment/Plan:   Radha Mcmanus is a 89 year old female who presented with chest, shoulder, and neck pain after a fall on the side of the bathtub, resulting in a R manubrium fracture with small retrosternal hematoma.    Known Injuries:  Sternal fracture with associated small retrosternal hemorrhage    - No acute surgical intervention  - Repeat CT chest with unchanged small retrosternal hemorrhage  - Regular diet  - MMPR  - Follow up OP with PCP for management of vertigo  - Tentatively plan for discharge today    Seen, examined, and discussed with chief resident, who will discuss with staff.  - - - - - - - - - - - - - - - - - -  Emili Isbell MD  General Surgery PGY-1  [facility]

## 2025-07-08 NOTE — MEDICATION SCRIBE - ADMISSION MEDICATION HISTORY
Medication Scribe Admission Medication History    Admission medication history is complete. The information provided in this note is only as accurate as the sources available at the time of the update.    Information Source(s): Family member and [facility] via in-person    Pertinent Information: per son+[facility], son reported pt is taking medications on PTA medication list as directed.     Changes made to PTA medication list:  Added: None  Deleted: None  Changed: None    Allergies reviewed with patient and updates made in EHR: yes    Medication History Completed By: Kathleen Patel 7/8/2025 2:38 AM    PTA Med List   Medication Sig Note Last Dose/Taking    acetaminophen (TYLENOL) 500 MG tablet Take 2 tablets (1,000 mg) by mouth 3 times daily.  7/6/2025 Morning    alendronate (FOSAMAX) 70 MG tablet TAKE 1 TABLET BY MOUTH EVERY 7 DAYS WITH LARGE GLASS OF WATER. DO NOT LIE DOWN FOR 2HRS AFTER. 7/8/2025: Per pt son, pt has not taken in a mouth-waiting on appointment  Past Month    amLODIPine (NORVASC) 5 MG tablet TAKE 1 TABLET BY MOUTH EVERY DAY  7/7/2025 Morning    aspirin (ASA) 81 MG EC tablet Take 81 mg by mouth  7/6/2025 Morning    Calcium Citrate-Vitamin D 250-200 MG-UNIT TABS Take 1 tablet by mouth  7/7/2025 Morning    diclofenac (VOLTAREN) 1 % topical gel Apply 2-4 grams to area up to four times daily using enclosed dosing card.  Unknown    fluticasone (FLONASE) 50 MCG/ACT nasal spray   More than a month    levothyroxine (SYNTHROID/LEVOTHROID) 25 MCG tablet TAKE 1 TAB BY MOUTH DAILY WITH A GLASS OF WATER. SEPARATE FROM FOOD, COFFEE, MEDICATIONS AND SUPPLEMENTS BY 2 HOURS.  7/7/2025 Morning    meclizine (ANTIVERT) 25 MG tablet Take 1 tablet (25 mg) by mouth nightly as needed for dizziness.  7/6/2025    moxifloxacin (VIGAMOX) 0.5 % ophthalmic solution Place 1 drop Into the left eye 4 times daily.  7/6/2025 Evening    omeprazole (PRILOSEC) 20 MG DR capsule TAKE 1 CAPSULE BY MOUTH EVERY DAY  7/7/2025 Morning    polyethylene  glycol (MIRALAX) 17 g packet Take 17 g by mouth daily  Past Week    prednisoLONE acetate (PRED FORTE) 1 % ophthalmic suspension Place 1-2 drops Into the left eye 2 times daily.  7/6/2025 Bedtime    tiZANidine (ZANAFLEX) 2 MG tablet Take 1 tablet (2 mg) by mouth 3 times daily as needed for muscle spasms.  Past Month    topiramate (TOPAMAX) 25 MG tablet Take 1 tablet (25 mg) by mouth daily.  Past Week    triamcinolone (KENALOG) 0.1 % external cream Apply topically 2 times daily. Use 2 weeks on, 1 week off. May repeat.  Past Week

## 2025-07-08 NOTE — PLAN OF CARE
"/85 (BP Location: Left arm)   Pulse 86   Temp 97.5  F (36.4  C) (Oral)   Resp 16   Ht 1.676 m (5' 6\")   Wt 60 kg (132 lb 4.4 oz)   LMP  (LMP Unknown)   SpO2 98%   BMI 21.35 kg/m      RN [id number]:    Patient is alert and oriented x 4. VSS on RA. Somalian speaking, son at bedside. R and L PIV SL. Regular diet, tolerating. Assist x 1. Possible discharge this afternoon vs home in the morning tomorrow after PT sees her again  "

## 2025-07-08 NOTE — PROGRESS NOTES
[facility] Care Coordination Contact      TRANSITIONS OF CARE (MAGGI) LOG    MAGGI tasks should be completed by the CC within one (1) business day of notification of each transition. Follow up contact with member is required after return to their usual care setting.  Note:  If CC finds out about the transitions fifteen (15) days or more after the member has returned to their usual care setting, no MAGGI log is needed. However, the CC should check in with the member to discuss the transition process, any changes needed to the care plan and document it in a case note.     Member Name:  Radha Mcmanus [facility] Name:  Nadja O/Health Plan Member ID#: [id number]   [place] [facility] [place] Contact:  Tello Ferris RN, PHN Agency/County/Care System: [facility]   Transition Communication Actions from Care Management Contact   Transition #1   Notification Date: 7/7/25 Transition Date:   7/8/25 Transition From: Home     Is this the member s usual care setting?               yes Transition To: [facility]   Transition Type:  Unplanned    Documentation from conversation with the member/responsible party, provider, discharging and receiving facility:   Date: 7/8/25: Received notification of admission to hospital with dx of fracture of the sternum due to fall  CC contacted Hospital /discharge planner, (Name) via the [employer] Transitional Care Hand-In Process, with community care plan included.  CC reached out to adult son Said regarding transition and offered support as needed.  Reviewed and update support plan as needed.  Notified community service providers and placed services Adult Day Care CFSS ( for Name and Phone Number of each contact) on hold as needed.  Transition log initiated.   PCP, Anjelica Kapadia, notified of hospitalization via EMR.     Transition #2   Notification Date: 7/9/25 Transition Date:   7/9/25 Transition From: [facility]     Is this the member s usual care setting?               no Transition To: Home   Transition Type:  Planned    Documentation from conversation with the member/responsible party, provider, discharging and receiving facility:   Date: 7/10/25: Received notification of transition to home.  CC contacted adult son Said and reviewed discharge summary.  Member has a follow-up appointment with PCP in 7 days: No: Offered Assistance with setting up a follow up appointment   Member has had a change in condition: No  Home visit needed: No  Support Plan reviewed and updated.  The following home based services CFSS were resumed.  New referrals placed: Yes: Therapies: PT  Transition log completed.   PCP, Anjelica Kapadia, notified of transition back to home via EMR.    Tello Ferris RN BSN PHN  [facility] Care Coordinator  [phone]  Fax:[phone]        *RETURN TO USUAL CARE SETTING: *Complete tasks below when the member is discharging TO their usual care setting within one (1) business day of notification..      For situations where the Care Coordinator is notified of the discharge prior to the date of discharge, the Care Coordinator must follow up with the member or designated representative to confirm that discharge actually occurred and discuss required MAGGI tasks as outlined in the MAGGI Instructions.  (This includes situations where it may be a  new  usual care setting for the member. (i.e., a community member who decides upon permanent nursing home placement following hospitalization and rehab).    Discuss with Member/Responsible Party:    Check  Yes  - if the member, family member and/or SNF/facility staff manages the following:  If  No  provide explanation in the comments section.          Date completed: 7/10/25 Communicated with member or their designated representative about the following: care transition process; about changes to the member s health status;  plan of care updates; education about transitions and how to prevent unplanned transitions/readmissions    Four Pillars for Optimal Transition:    Check  Yes  - if the member, family member and/or SNF/facility staff manages the following:    If  No  provide explanation in the comments section.          []  Yes     [x]  No Does the member have a follow-up appointment scheduled with primary care or specialist? (Mental health hospitalizations--the appt. should be w/in 7 days)              For mental health hospitalizations:  []  Yes     []  No     Does the member have a follow-up appointment scheduled with a mental health practitioner within 7 days of discharge?  [x]  Yes     []  No     Has a medication review been completed with member? If no, refer to PCP, home care nurse, MTM, pharmacist  [x]  Yes     []  No     Can the member manage their medications or is there a system in place to manage medications (e.g. home care set-up)?         [x]  Yes     []  No     Can the member verbalize warning signs and symptoms to watch for and how to respond?  [x]  Yes     []  No     Does the member have a copy of and understand their discharge instructions?  If no, assist to obtain copy of discharge instructions, review discharge instructions, and assist to contact PCP to discuss questions about their recent hospitalization.  [x]  Yes     []  No     Does the member have adequate food, housing and transportation?  If no, add goal and discuss additional supports available to the member.                                                                                                                                                                                [x]  Yes     []  No     Does the member have concerns with accessibility and/or safety in their home? If yes, document needs and support offered.                                                                                                                                                                                                                                                                                                                                                   []  Yes     [x]  No     Are there any concerns of vulnerability, abuse, or neglect?  If yes, document concerns and actions taken by Care Coordinator as a mandated .                                                                                                                                                                             [x]  Yes     []  No     Does the member use a Personal Health Care Record?  Check  Yes  if visit summary, discharge summary, and/or healthcare summary are being used as a PHR.                                                                                                                                                                                  [x]  Yes     []  No     Have you reviewed the discharge summary with the member? If  No  provide explanation in comments.  [x]  Yes     []  No     Have you updated the member s care plan/support plan? Add new diagnosis, medications, treatments, goals & interventions, as applicable. If No, provide explanation in comments.  [x]  Yes     []  No     Education provided on preventing readmission. Consider sharing health plan or care system resources such as Nurse Triage, Where to Go for Care, mental health supports and hotline.  [x]  Yes     []  No     [facility]: Provided education to member regarding readmission prevention benefits and potential outreach from these programs (if applicable).

## 2025-07-08 NOTE — ED PROVIDER NOTES
"ED Provider Note  [facility]      History     Chief Complaint   Patient presents with    Fall    Dizziness     HPI  Radha Mcmanus is a 89 year old female presenting in transfer from the [facility] emergency department for sternal fracture.     Patient fell onto the right side of her body when trying to get onto the bathtub.  Injury occurred at 6 AM yesterday. She did hit her head. Does have pain in the right shoulder and in her chest, but the chest pain seems to be less significant.     I reviewed the workup that was done prior to transfer that included labs, troponin, and urinalysis that are unremarkable. Imaging included CT head, CT C-spine, and CT chest as well as x-ray of the right shoulder, notable for a minimally displaced oblique fracture through the right side of the sternal manubrium with a small retrosternal hemorrhage.     On transfer, vitals are stable including respiratory and work of breathing.  She did request pain medication     {History Review Selection (Optional):468861}  {ROS Selection (Optional):570938}    Physical Exam   BP: 134/81  Pulse: 86  Temp: 98.7  F (37.1  C)  Resp: 16  Height: 167.6 cm (5' 6\")  Weight: 61.7 kg (136 lb)  SpO2: 100 %  Physical Exam  Gen:A&Ox3, no acute distress  HEENT:PERRL, no facial tenderness or wounds, head atraumatic, oropharynx clear, mucous membranes moist, TMs clear bilaterally  Neck:no bony tenderness or step offs, no JVD, trachea midline  Back: no CVA tenderness, no midline bony tenderness  CV:RRR without murmurs  PULM:Clear to auscultation bilaterally  Abd:soft, nontender, nondistended. Bowel sounds present and normal  Rectal:***  : no wounds or infection  UE:No traumatic injuries, skin normal  LE:no traumatic injuries, skin normal  Neuro:CN II-XII intact, strength 5/5 throughout  Skin: no rashes or ecchymoses  ***     ED Course, Procedures, & Data     ED Course as of 07/07/25 2043 Mon Jul 07, 2025   1326 EKG 12 lead   1329 POC US " "CHEST B-SCAN     Procedures       {ED Course Selections (Optional):256211}  {ED Sepsis CMS Documentation (Optional):656527::\" \"}       Results for orders placed or performed during the hospital encounter of 07/07/25   CT Head w/o Contrast    Impression    IMPRESSION:  HEAD CT:  1.  No CT evidence for acute intracranial process.  2.  Brain atrophy and presumed chronic microvascular ischemic changes as above.    CERVICAL SPINE CT:  1.  No acute cervical spine fracture.  2. Degenerative changes, as described.   CT Cervical Spine w/o Contrast    Impression    IMPRESSION:  HEAD CT:  1.  No CT evidence for acute intracranial process.  2.  Brain atrophy and presumed chronic microvascular ischemic changes as above.    CERVICAL SPINE CT:  1.  No acute cervical spine fracture.  2. Degenerative changes, as described.   Chest CT w/o contrast    Impression    IMPRESSION:   1.  Minimally displaced oblique fracture through the right sternal manubrium with associated small retrosternal hemorrhage.  2.  No definite right-sided rib fracture.  3.  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     XR Shoulder Right G/E 3 Views    Impression    IMPRESSION: No acute fracture or malalignment. Mild glenohumeral and moderate acromioclavicular joint degenerative changes. Osteopenia.   POC US CHEST B-SCAN    Impression    Limited Bedside Lung Ultrasound, performed and interpreted by me.   Indication: Chest pain    With the patient positioned supine, bilateral anterior and lateral lung fields were examined for evidence of thoracic free fluid, pulmonary consolidation, and pulmonary edema.       Findings: All lung fields showed A-lines consisting with normal lung artifact. No free fluid.     IMPRESSION: Normal lung ultrasound without evidence of pneumothorax      INR   Result Value Ref Range    INR 1.01 0.85 - 1.15    PT 13.7 11.8 - 14.8 Seconds "   Comprehensive metabolic panel   Result Value Ref Range    Sodium 137 135 - 145 mmol/L    Potassium 3.4 3.4 - 5.3 mmol/L    Carbon Dioxide (CO2) 26 22 - 29 mmol/L    Anion Gap 13 7 - 15 mmol/L    Urea Nitrogen 10.6 8.0 - 23.0 mg/dL    Creatinine 0.73 0.51 - 0.95 mg/dL    GFR Estimate 76 >60 mL/min/1.73m2    Calcium 9.0 8.8 - 10.4 mg/dL    Chloride 98 98 - 107 mmol/L    Glucose 99 70 - 99 mg/dL    Alkaline Phosphatase 76 40 - 150 U/L    AST 26 0 - 45 U/L    ALT 14 0 - 50 U/L    Protein Total 7.8 6.4 - 8.3 g/dL    Albumin 3.9 3.5 - 5.2 g/dL    Bilirubin Total 0.7 <=1.2 mg/dL   Result Value Ref Range    Troponin T, High Sensitivity 9 <=14 ng/L   Result Value Ref Range    Magnesium 2.0 1.7 - 2.3 mg/dL   UA with Microscopic reflex to Culture    Specimen: Urine, Clean Catch   Result Value Ref Range    Color Urine Light Yellow Colorless, Straw, Light Yellow, Yellow    Appearance Urine Clear Clear    Glucose Urine Negative Negative mg/dL    Bilirubin Urine Negative Negative    Ketones Urine Negative Negative mg/dL    Specific Gravity Urine 1.010 1.003 - 1.035    Blood Urine Trace (A) Negative    pH Urine 7.0 5.0 - 7.0    Protein Albumin Urine Negative Negative mg/dL    Urobilinogen Urine Normal Normal mg/dL    Nitrite Urine Negative Negative    Leukocyte Esterase Urine Negative Negative    Mucus Urine Present (A) None Seen /LPF    RBC Urine 1 <=2 /HPF    WBC Urine 1 <=5 /HPF    Squamous Epithelials Urine <1 <=1 /HPF   CBC with platelets and differential   Result Value Ref Range    WBC Count 5.4 4.0 - 11.0 10e3/uL    RBC Count 4.62 3.80 - 5.20 10e6/uL    Hemoglobin 13.6 11.7 - 15.7 g/dL    Hematocrit 40.1 35.0 - 47.0 %    MCV 87 78 - 100 fL    MCH 29.4 26.5 - 33.0 pg    MCHC 33.9 31.5 - 36.5 g/dL    RDW 13.2 10.0 - 15.0 %    Platelet Count 163 150 - 450 10e3/uL    % Neutrophils 52 %    % Lymphocytes 33 %    % Monocytes 10 %    % Eosinophils 5 %    % Basophils 1 %    % Immature Granulocytes 0 %    NRBCs per 100 WBC 0 <1 /100     Absolute Neutrophils 2.8 1.6 - 8.3 10e3/uL    Absolute Lymphocytes 1.7 0.8 - 5.3 10e3/uL    Absolute Monocytes 0.5 0.0 - 1.3 10e3/uL    Absolute Eosinophils 0.3 0.0 - 0.7 10e3/uL    Absolute Basophils 0.0 0.0 - 0.2 10e3/uL    Absolute Immature Granulocytes 0.0 <=0.4 10e3/uL    Absolute NRBCs 0.0 10e3/uL   Result Value Ref Range    Troponin T, High Sensitivity 8 <=14 ng/L   EKG 12 lead   Result Value Ref Range    Systolic Blood Pressure  mmHg    Diastolic Blood Pressure  mmHg    Ventricular Rate 85 BPM    Atrial Rate 85 BPM    WA Interval 168 ms    QRS Duration 90 ms     ms    QTc 447 ms    P Axis 65 degrees    R AXIS -58 degrees    T Axis -1 degrees    Interpretation ECG       Sinus rhythm  Left axis deviation  T wave abnormality, consider anterolateral ischemia  Abnormal ECG  Unconfirmed report - interpretation of this ECG is computer generated - see medical record for final interpretation    Confirmed by - EMERGENCY ROOM, PHYSICIAN ([id number]),  JASPREET JACKSON ([id number]) on 7/7/2025 12:58:20 PM       Medications   acetaminophen (TYLENOL) tablet 650 mg (650 mg Oral $Given 7/7/25 1645)   morphine (PF) injection 4 mg (4 mg Intravenous $Given 7/7/25 1702)          {Critical Care Performed?:432860}    Assessment & Plan    89-year-old female presenting after slip and fall with a sternal fracture with small retrosternal hematoma. Vitally stable. Morphine pairing *** for pain. Trauma consulted. Laboratory testing and imaging done prior to transfer, all reviewed.  Anticipating admission to trauma service.    I have reviewed the nursing notes. I have reviewed the findings, diagnosis, plan and need for follow up with the patient.    New Prescriptions    No medications on file       Final diagnoses:   Closed fracture of body of sternum, initial encounter   Fall, initial encounter       Peri Etinene MD  [facility] EMERGENCY DEPARTMENT  7/7/2025

## 2025-07-08 NOTE — PLAN OF CARE
Occupational Therapy: Orders received. Chart reviewed and discussed with PT.? Occupational Therapy not indicated due to pt primarily SBA and limited by neck/sternal/shoulder pain from fall. Current plan for home w/ 24/7 A.? Defer discharge recommendations to PT and medical team.? Will complete orders.

## 2025-07-09 ENCOUNTER — APPOINTMENT (OUTPATIENT)
Dept: PHYSICAL THERAPY | Facility: CLINIC | Age: OVER 89
End: 2025-07-09
Payer: COMMERCIAL

## 2025-07-09 VITALS
SYSTOLIC BLOOD PRESSURE: 130 MMHG | WEIGHT: 132.28 LBS | BODY MASS INDEX: 21.26 KG/M2 | HEIGHT: 66 IN | OXYGEN SATURATION: 95 % | DIASTOLIC BLOOD PRESSURE: 74 MMHG | RESPIRATION RATE: 16 BRPM | TEMPERATURE: 97.9 F | HEART RATE: 73 BPM

## 2025-07-09 PROCEDURE — 250N000011 HC RX IP 250 OP 636: Performed by: STUDENT IN AN ORGANIZED HEALTH CARE EDUCATION/TRAINING PROGRAM

## 2025-07-09 PROCEDURE — G0378 HOSPITAL OBSERVATION PER HR: HCPCS

## 2025-07-09 PROCEDURE — 250N000013 HC RX MED GY IP 250 OP 250 PS 637: Performed by: STUDENT IN AN ORGANIZED HEALTH CARE EDUCATION/TRAINING PROGRAM

## 2025-07-09 PROCEDURE — 97116 GAIT TRAINING THERAPY: CPT | Mod: GP

## 2025-07-09 PROCEDURE — 96375 TX/PRO/DX INJ NEW DRUG ADDON: CPT

## 2025-07-09 PROCEDURE — 97530 THERAPEUTIC ACTIVITIES: CPT | Mod: GP

## 2025-07-09 RX ADMIN — OXYCODONE HYDROCHLORIDE 2.5 MG: 5 TABLET ORAL at 09:42

## 2025-07-09 RX ADMIN — DICLOFENAC SODIUM 4 G: 10 GEL TOPICAL at 11:45

## 2025-07-09 RX ADMIN — PANTOPRAZOLE SODIUM 40 MG: 40 TABLET, DELAYED RELEASE ORAL at 09:24

## 2025-07-09 RX ADMIN — ONDANSETRON 4 MG: 2 INJECTION INTRAMUSCULAR; INTRAVENOUS at 06:12

## 2025-07-09 RX ADMIN — PREDNISOLONE ACETATE 1 DROP: 10 SUSPENSION/ DROPS OPHTHALMIC at 09:28

## 2025-07-09 RX ADMIN — LEVOTHYROXINE SODIUM 25 MCG: 0.03 TABLET ORAL at 09:24

## 2025-07-09 RX ADMIN — ASPIRIN 81 MG: 81 TABLET ORAL at 09:24

## 2025-07-09 RX ADMIN — DICLOFENAC SODIUM 4 G: 10 GEL TOPICAL at 09:24

## 2025-07-09 RX ADMIN — SENNOSIDES AND DOCUSATE SODIUM 1 TABLET: 8.6; 5 TABLET ORAL at 09:24

## 2025-07-09 RX ADMIN — ACETAMINOPHEN 1000 MG: 500 TABLET ORAL at 09:23

## 2025-07-09 RX ADMIN — HYDROMORPHONE HYDROCHLORIDE 0.2 MG: 0.2 INJECTION, SOLUTION INTRAMUSCULAR; INTRAVENOUS; SUBCUTANEOUS at 06:12

## 2025-07-09 RX ADMIN — ACETAMINOPHEN 1000 MG: 500 TABLET ORAL at 13:24

## 2025-07-09 ASSESSMENT — ACTIVITIES OF DAILY LIVING (ADL)
ADLS_ACUITY_SCORE: 39

## 2025-07-09 NOTE — PLAN OF CARE
Goal Outcome Evaluation:  PRIMARY DIAGNOSIS: Fall Related dizziness.  OUTPATIENT/OBSERVATION GOALS TO BE MET BEFORE DISCHARGE:    ADLs back to baseline: Yes     Activity and level of assistance: Up with 1-2 assist      Pain status: Improved with scheduled tylenol     Return to near baseline physical activity: Yes    Pt discharged to home Son at 1400. Reviewed discharge paperwork with Son. PIV removed. All belongings sent with Patient

## 2025-07-09 NOTE — PLAN OF CARE
Goal Outcome Evaluation:    VSS. RA. Neck and shoulder pain managed with prn oxycodone. Voids spont to bathroom. Plan to discharge to home today with Son. Up with 1 assist.

## 2025-07-09 NOTE — PLAN OF CARE
PRIMARY DIAGNOSIS: Fall Related dizziness.  OUTPATIENT/OBSERVATION GOALS TO BE MET BEFORE DISCHARGE:  ADLs back to baseline: No     Activity and level of assistance: Up with 1-2 assist      Pain status: Improved with oxycodone     Return to near baseline physical activity: No

## 2025-07-09 NOTE — PROGRESS NOTES
Surgery Progress Note  07/09/2025       Subjective:  NAEON. Pain well-controlled. Denies nausea or vomiting. Tolerating a regular diet. Has been working with PT for vertigo symptoms.     Objective:  Temp:  [97.4  F (36.3  C)-98.3  F (36.8  C)] 98.1  F (36.7  C)  Pulse:  [71-92] 78  Resp:  [16] 16  BP: (117-133)/(62-85) 133/77  SpO2:  [97 %-99 %] 98 %    I/O last 3 completed shifts:  In: 200 [P.O.:200]  Out: -       Gen: Awake, alert, NAD  Resp: NLB on RA. Mildly TTP across manubrium.  Abd: soft, nondistended, nontender  Ext: WWP, no edema     Labs:  Recent Labs   Lab 07/07/25  1401   WBC 5.4   HGB 13.6          Recent Labs   Lab 07/07/25  1401      POTASSIUM 3.4   CHLORIDE 98   CO2 26   BUN 10.6   CR 0.73   GLC 99   WILLIAM 9.0   MAG 2.0       Imaging:  None     Assessment/Plan:   Radha Mcmanus is a 89 year old female who presented with chest, shoulder, and neck pain after a fall on the side of the bathtub, resulting in a R manubrium fracture with small retrosternal hematoma.     Known Injuries:  Sternal fracture with associated small retrosternal hemorrhage     - No acute surgical intervention  - Regular diet  - MMPR  - Follow up OP with PCP for management of vertigo  - Plan for discharge today following PT treatment     Seen, examined, and discussed with chief resident, who will discuss with staff.- - - - - - - - - -  Joshua Gardner MD  PGY-1 Surgery Resident

## 2025-07-09 NOTE — PLAN OF CARE
Goal Outcome Evaluation:    Plan of Care Reviewed With: patient    Overall Patient Progress: improvingOverall Patient Progress: improving     PRIMARY DIAGNOSIS: Fall Related dizziness.  OUTPATIENT/OBSERVATION GOALS TO BE MET BEFORE DISCHARGE:  ADLs back to baseline: No    Activity and level of assistance: Up with maximum assistance. Consider SW and/or PT evaluation.     Pain status: Improved but still requiring IV dilaudid and Zofran     Return to near baseline physical activity: No     Discharge Planner Nurse   Safe discharge environment identified: No  Barriers to discharge: Yes       Entered by: Alvin Salguero RN 07/09/2025 6:25 AM   Vitals are Temp: 98.1  F (36.7  C) Temp src: Oral BP: 133/77 Pulse: 78   Resp: 16 SpO2: 98 %.  Patient is Alert and Oriented x4. Patient is 1 Assist with no assistive devices .Pt is on a Regular diet. Patient complain of 6/10 pain.  Dilaudid and Zofran given for pain.  Patient is Saline locked. Patient had her daughter at bedside  Please review provider order for any additional goals.   Nurse to notify provider when observation goals have been met and patient is ready for discharge.

## 2025-07-09 NOTE — PLAN OF CARE
"Goal Outcome Evaluation:      Plan of Care Reviewed With: patient, family    Overall Patient Progress: no change    Blood pressure 129/73, pulse 83, temperature 98.3  F (36.8  C), temperature source Oral, resp. rate 16, height 1.676 m (5' 6\"), weight 60 kg (132 lb 4.4 oz), SpO2 99%, not currently breastfeeding.     Pt to leave in AM after PT consult. Family at bedside to help with communication and care ([place] speaking).   Pain rated a 5/10 on right side from trauma of fall. Given oxycodone, acetaminophen, and voltaren. Pt VSS, RA, reg diet. A&Ox4. Up with AX1.          "

## 2025-07-17 ENCOUNTER — OFFICE VISIT (OUTPATIENT)
Dept: FAMILY MEDICINE | Facility: CLINIC | Age: OVER 89
End: 2025-07-17
Payer: COMMERCIAL

## 2025-07-17 VITALS
HEART RATE: 82 BPM | TEMPERATURE: 97.3 F | DIASTOLIC BLOOD PRESSURE: 76 MMHG | WEIGHT: 138.4 LBS | OXYGEN SATURATION: 100 % | RESPIRATION RATE: 16 BRPM | HEIGHT: 65 IN | BODY MASS INDEX: 23.06 KG/M2 | SYSTOLIC BLOOD PRESSURE: 125 MMHG

## 2025-07-17 DIAGNOSIS — M54.2 NECK PAIN: ICD-10-CM

## 2025-07-17 DIAGNOSIS — S22.22XD CLOSED FRACTURE OF BODY OF STERNUM WITH ROUTINE HEALING, SUBSEQUENT ENCOUNTER: ICD-10-CM

## 2025-07-17 DIAGNOSIS — R26.89 IMBALANCE: ICD-10-CM

## 2025-07-17 DIAGNOSIS — Z09 HOSPITAL DISCHARGE FOLLOW-UP: Primary | ICD-10-CM

## 2025-07-17 RX ORDER — TRAMADOL HYDROCHLORIDE 50 MG/1
50 TABLET ORAL AT BEDTIME
Qty: 10 TABLET | Refills: 0 | Status: SHIPPED | OUTPATIENT
Start: 2025-07-17

## 2025-07-17 RX ORDER — TIZANIDINE 2 MG/1
2 TABLET ORAL 3 TIMES DAILY PRN
Qty: 30 TABLET | Refills: 3 | Status: SHIPPED | OUTPATIENT
Start: 2025-07-17

## 2025-07-17 ASSESSMENT — PAIN SCALES - GENERAL: PAINLEVEL_OUTOF10: MODERATE PAIN (6)

## 2025-07-17 NOTE — PROGRESS NOTES
Assessment & Plan     (Z09) Hospital discharge follow-up  (primary encounter diagnosis)  Comment:   Plan:     (S22.22XD) Closed fracture of body of sternum with routine healing, subsequent encounter  Comment:   Plan: She continues to have pain that is keeping her awake at night. The expected course of recovery reviewed. Small supply of tramadol given for severe pain relief, otherwise advised topical voltaren, lidocaine patches and tylenol prn. If any worsening of symptoms, please contact the clinical team sooner, otherwise follow up as discussed      (M54.2) Neck pain  Comment:   Plan: diclofenac (VOLTAREN) 1 % topical gel,         tiZANidine (ZANAFLEX) 2 MG tablet          MED REC REQUIRED  Post Medication Reconciliation Status:  Discharge medications reconciled, continue medications without change      Subjective   Radha is a 89 year old, presenting for the following health issues:  Hospital F/U (Dizziness, general pain, and throat pain after discharge)    HPI          Hospital Follow-up Visit:    Hospital/Nursing Home/IP Rehab Facility: [facility]  Most Recent Admission Date: 7/7/2025   Most Recent Admission Diagnosis: Fall, initial encounter - W19.XXXA  Closed fracture of body of sternum, initial encounter - S22.22XA     Most Recent Discharge Date: 7/9/2025   Most Recent Discharge Diagnosis: Closed fracture of body of sternum, initial encounter - S22.22XA  Fall, initial encounter - W19.XXXA  Vertigo - R42   Do you have any other stressors you would like to discuss with your provider? No    Problems taking medications regularly:  None  Medication changes since discharge: None  Problems adhering to non-medication therapy:  None    Summary of hospitalization:  [facility] discharge summary reviewed  Diagnostic Tests/Treatments reviewed.  Follow up needed: none  Other Healthcare Providers Involved in Patient s Care:         Physical Therapy  Update since  "discharge: improved.         Plan of care communicated with patient and family     Sternal fracture with associated small retrosternal hemorrhage          Hospital Course:   The patient was found to have a non-displaced R manubrial fracture on CT with small retrosternal hemorrhage. The hemorrhage was unchanged on repeat CT chest with contrast. The patient was ambulatory on the day of discharge. She was able to tolerate a regular diet. Her pain was well-controlled. Vertigo was assessed and treated per PT, who deemed her safe for discharge on 7/9. She was discharged home in stable condition.                    Objective    /76 (BP Location: Right arm, Patient Position: Sitting, Cuff Size: Adult Regular)   Pulse 82   Temp 97.3  F (36.3  C) (Temporal)   Resp 16   Ht 1.651 m (5' 5\")   Wt 62.8 kg (138 lb 6.4 oz)   LMP  (LMP Unknown)   SpO2 100%   BMI 23.03 kg/m    Body mass index is 23.03 kg/m .  Physical Exam   GENERAL: alert and no distress  RESP: lungs clear to auscultation - no rales, rhonchi or wheezes  CV: regular rate and rhythm, normal S1 S2, no S3 or S4, no murmur, click or rub, no peripheral edema; mild sternal TTP            Signed Electronically by: Carroll Marks PA-C    "

## 2025-07-19 DIAGNOSIS — K21.9 GASTRO-ESOPHAGEAL REFLUX DISEASE WITHOUT ESOPHAGITIS: ICD-10-CM

## 2025-07-19 DIAGNOSIS — I10 ESSENTIAL (PRIMARY) HYPERTENSION: ICD-10-CM

## 2025-07-21 RX ORDER — OMEPRAZOLE 20 MG/1
20 CAPSULE, DELAYED RELEASE ORAL DAILY
Qty: 90 CAPSULE | Refills: 0 | Status: SHIPPED | OUTPATIENT
Start: 2025-07-21

## 2025-07-21 RX ORDER — AMLODIPINE BESYLATE 5 MG/1
5 TABLET ORAL DAILY
Qty: 90 TABLET | Refills: 0 | Status: SHIPPED | OUTPATIENT
Start: 2025-07-21

## 2025-07-27 ENCOUNTER — RESULTS FOLLOW-UP (OUTPATIENT)
Dept: INTERNAL MEDICINE | Facility: CLINIC | Age: OVER 89
End: 2025-07-27
Payer: COMMERCIAL

## 2025-07-28 ENCOUNTER — PREP FOR PROCEDURE (OUTPATIENT)
Dept: OPHTHALMOLOGY | Facility: CLINIC | Age: OVER 89
End: 2025-07-28
Payer: COMMERCIAL

## 2025-08-03 RX ORDER — CYCLOPENTOLAT/TROPIC/PHENYLEPH 1%-1%-2.5%
1 DROPS (EA) OPHTHALMIC (EYE)
Status: CANCELLED | OUTPATIENT
Start: 2025-08-03

## 2025-08-03 RX ORDER — PROPARACAINE HYDROCHLORIDE 5 MG/ML
1 SOLUTION/ DROPS OPHTHALMIC ONCE
Status: CANCELLED | OUTPATIENT
Start: 2025-08-03 | End: 2025-08-03

## 2025-08-03 RX ORDER — MOXIFLOXACIN 5 MG/ML
1 SOLUTION/ DROPS OPHTHALMIC
Status: CANCELLED | OUTPATIENT
Start: 2025-08-03

## 2025-08-05 DIAGNOSIS — M54.41 ACUTE BILATERAL LOW BACK PAIN WITH RIGHT-SIDED SCIATICA: ICD-10-CM

## 2025-08-05 RX ORDER — MOXIFLOXACIN 5 MG/ML
1 SOLUTION/ DROPS OPHTHALMIC 3 TIMES DAILY
Qty: 3 ML | Refills: 1 | Status: SHIPPED | OUTPATIENT
Start: 2025-08-05

## 2025-08-05 RX ORDER — GABAPENTIN 100 MG/1
CAPSULE ORAL
Qty: 60 CAPSULE | Refills: 11 | Status: SHIPPED | OUTPATIENT
Start: 2025-08-05

## 2025-08-05 RX ORDER — PREDNISOLONE ACETATE 10 MG/ML
1-2 SUSPENSION/ DROPS OPHTHALMIC
Qty: 10 ML | Refills: 11 | Status: SHIPPED | OUTPATIENT
Start: 2025-08-05

## 2025-08-06 ENCOUNTER — ANESTHESIA EVENT (OUTPATIENT)
Dept: SURGERY | Facility: AMBULATORY SURGERY CENTER | Age: OVER 89
End: 2025-08-06
Payer: COMMERCIAL

## 2025-08-07 ENCOUNTER — ANESTHESIA (OUTPATIENT)
Dept: SURGERY | Facility: AMBULATORY SURGERY CENTER | Age: OVER 89
End: 2025-08-07
Payer: COMMERCIAL

## 2025-08-07 ENCOUNTER — OFFICE VISIT (OUTPATIENT)
Dept: INTERPRETER SERVICES | Facility: CLINIC | Age: OVER 89
End: 2025-08-07

## 2025-08-07 ENCOUNTER — HOSPITAL ENCOUNTER (OUTPATIENT)
Facility: AMBULATORY SURGERY CENTER | Age: OVER 89
End: 2025-08-07
Attending: OPHTHALMOLOGY
Payer: COMMERCIAL

## 2025-08-07 VITALS
HEIGHT: 67 IN | SYSTOLIC BLOOD PRESSURE: 113 MMHG | OXYGEN SATURATION: 97 % | WEIGHT: 137 LBS | RESPIRATION RATE: 16 BRPM | BODY MASS INDEX: 21.5 KG/M2 | DIASTOLIC BLOOD PRESSURE: 65 MMHG | TEMPERATURE: 97 F | HEART RATE: 85 BPM

## 2025-08-07 DIAGNOSIS — Z98.890 POSTOPERATIVE EYE STATE: Primary | ICD-10-CM

## 2025-08-07 DIAGNOSIS — H25.13 NUCLEAR SENILE CATARACT OF BOTH EYES: ICD-10-CM

## 2025-08-07 PROCEDURE — 99000 SPECIMEN HANDLING OFFICE-LAB: CPT | Performed by: PATHOLOGY

## 2025-08-07 PROCEDURE — 87102 FUNGUS ISOLATION CULTURE: CPT | Performed by: OPHTHALMOLOGY

## 2025-08-07 PROCEDURE — 87070 CULTURE OTHR SPECIMN AEROBIC: CPT | Performed by: OPHTHALMOLOGY

## 2025-08-07 RX ORDER — ONDANSETRON 4 MG/1
4 TABLET, ORALLY DISINTEGRATING ORAL EVERY 30 MIN PRN
Status: DISCONTINUED | OUTPATIENT
Start: 2025-08-07 | End: 2025-08-07 | Stop reason: HOSPADM

## 2025-08-07 RX ORDER — ONDANSETRON 2 MG/ML
4 INJECTION INTRAMUSCULAR; INTRAVENOUS EVERY 30 MIN PRN
Status: ACTIVE | OUTPATIENT
Start: 2025-08-07

## 2025-08-07 RX ORDER — DEXAMETHASONE SODIUM PHOSPHATE 4 MG/ML
INJECTION, SOLUTION INTRA-ARTICULAR; INTRALESIONAL; INTRAMUSCULAR; INTRAVENOUS; SOFT TISSUE PRN
Status: DISCONTINUED | OUTPATIENT
Start: 2025-08-07 | End: 2025-08-07 | Stop reason: HOSPADM

## 2025-08-07 RX ORDER — MOXIFLOXACIN 5 MG/ML
1 SOLUTION/ DROPS OPHTHALMIC 4 TIMES DAILY
Qty: 3 ML | Refills: 4 | Status: SHIPPED | OUTPATIENT
Start: 2025-08-07 | End: 2025-09-06

## 2025-08-07 RX ORDER — HYDROMORPHONE HYDROCHLORIDE 1 MG/ML
0.2 INJECTION, SOLUTION INTRAMUSCULAR; INTRAVENOUS; SUBCUTANEOUS EVERY 5 MIN PRN
Status: DISCONTINUED | OUTPATIENT
Start: 2025-08-07 | End: 2025-08-07 | Stop reason: HOSPADM

## 2025-08-07 RX ORDER — PROPARACAINE HYDROCHLORIDE 5 MG/ML
1 SOLUTION/ DROPS OPHTHALMIC ONCE
Status: COMPLETED | OUTPATIENT
Start: 2025-08-07 | End: 2025-08-07

## 2025-08-07 RX ORDER — FENTANYL CITRATE 50 UG/ML
50 INJECTION, SOLUTION INTRAMUSCULAR; INTRAVENOUS EVERY 5 MIN PRN
Status: DISCONTINUED | OUTPATIENT
Start: 2025-08-07 | End: 2025-08-07 | Stop reason: HOSPADM

## 2025-08-07 RX ORDER — DEXAMETHASONE SODIUM PHOSPHATE 10 MG/ML
4 INJECTION, SOLUTION INTRAMUSCULAR; INTRAVENOUS
Status: ACTIVE | OUTPATIENT
Start: 2025-08-07

## 2025-08-07 RX ORDER — ONDANSETRON 2 MG/ML
4 INJECTION INTRAMUSCULAR; INTRAVENOUS EVERY 30 MIN PRN
Status: DISCONTINUED | OUTPATIENT
Start: 2025-08-07 | End: 2025-08-07 | Stop reason: HOSPADM

## 2025-08-07 RX ORDER — NALOXONE HYDROCHLORIDE 0.4 MG/ML
0.1 INJECTION, SOLUTION INTRAMUSCULAR; INTRAVENOUS; SUBCUTANEOUS
Status: ACTIVE | OUTPATIENT
Start: 2025-08-07

## 2025-08-07 RX ORDER — OXYCODONE HYDROCHLORIDE 5 MG/1
5 TABLET ORAL
Status: ACTIVE | OUTPATIENT
Start: 2025-08-07

## 2025-08-07 RX ORDER — DEXAMETHASONE SODIUM PHOSPHATE 4 MG/ML
INJECTION, SOLUTION INTRA-ARTICULAR; INTRALESIONAL; INTRAMUSCULAR; INTRAVENOUS; SOFT TISSUE PRN
Status: DISCONTINUED | OUTPATIENT
Start: 2025-08-07 | End: 2025-08-07

## 2025-08-07 RX ORDER — OXYCODONE HYDROCHLORIDE 5 MG/1
10 TABLET ORAL
Status: ACTIVE | OUTPATIENT
Start: 2025-08-07

## 2025-08-07 RX ORDER — SODIUM CHLORIDE, SODIUM LACTATE, POTASSIUM CHLORIDE, CALCIUM CHLORIDE 600; 310; 30; 20 MG/100ML; MG/100ML; MG/100ML; MG/100ML
INJECTION, SOLUTION INTRAVENOUS CONTINUOUS
Status: DISCONTINUED | OUTPATIENT
Start: 2025-08-07 | End: 2025-08-07 | Stop reason: HOSPADM

## 2025-08-07 RX ORDER — PROPOFOL 10 MG/ML
INJECTION, EMULSION INTRAVENOUS CONTINUOUS PRN
Status: DISCONTINUED | OUTPATIENT
Start: 2025-08-07 | End: 2025-08-07

## 2025-08-07 RX ORDER — HYDROMORPHONE HYDROCHLORIDE 1 MG/ML
0.4 INJECTION, SOLUTION INTRAMUSCULAR; INTRAVENOUS; SUBCUTANEOUS EVERY 5 MIN PRN
Status: DISCONTINUED | OUTPATIENT
Start: 2025-08-07 | End: 2025-08-07 | Stop reason: HOSPADM

## 2025-08-07 RX ORDER — LIDOCAINE HYDROCHLORIDE 20 MG/ML
INJECTION, SOLUTION INFILTRATION; PERINEURAL PRN
Status: DISCONTINUED | OUTPATIENT
Start: 2025-08-07 | End: 2025-08-07

## 2025-08-07 RX ORDER — ONDANSETRON 4 MG/1
4 TABLET, ORALLY DISINTEGRATING ORAL EVERY 30 MIN PRN
Status: ACTIVE | OUTPATIENT
Start: 2025-08-07

## 2025-08-07 RX ORDER — SODIUM CHLORIDE, SODIUM LACTATE, POTASSIUM CHLORIDE, CALCIUM CHLORIDE 600; 310; 30; 20 MG/100ML; MG/100ML; MG/100ML; MG/100ML
INJECTION, SOLUTION INTRAVENOUS CONTINUOUS PRN
Status: DISCONTINUED | OUTPATIENT
Start: 2025-08-07 | End: 2025-08-07

## 2025-08-07 RX ORDER — PROPOFOL 10 MG/ML
INJECTION, EMULSION INTRAVENOUS PRN
Status: DISCONTINUED | OUTPATIENT
Start: 2025-08-07 | End: 2025-08-07

## 2025-08-07 RX ORDER — CYCLOPENTOLAT/TROPIC/PHENYLEPH 1%-1%-2.5%
1 DROPS (EA) OPHTHALMIC (EYE) 3 TIMES DAILY PRN
Status: COMPLETED | OUTPATIENT
Start: 2025-08-07 | End: 2025-08-07

## 2025-08-07 RX ORDER — PREDNISOLONE ACETATE 10 MG/ML
1 SUSPENSION/ DROPS OPHTHALMIC 4 TIMES DAILY
Qty: 5 ML | Refills: 11 | Status: SHIPPED | OUTPATIENT
Start: 2025-08-07 | End: 2026-02-03

## 2025-08-07 RX ORDER — FENTANYL CITRATE 50 UG/ML
25 INJECTION, SOLUTION INTRAMUSCULAR; INTRAVENOUS EVERY 5 MIN PRN
Status: DISCONTINUED | OUTPATIENT
Start: 2025-08-07 | End: 2025-08-07 | Stop reason: HOSPADM

## 2025-08-07 RX ORDER — NALOXONE HYDROCHLORIDE 0.4 MG/ML
0.1 INJECTION, SOLUTION INTRAMUSCULAR; INTRAVENOUS; SUBCUTANEOUS
Status: DISCONTINUED | OUTPATIENT
Start: 2025-08-07 | End: 2025-08-07 | Stop reason: HOSPADM

## 2025-08-07 RX ORDER — ACETAMINOPHEN 325 MG/1
975 TABLET ORAL ONCE
Status: COMPLETED | OUTPATIENT
Start: 2025-08-07 | End: 2025-08-07

## 2025-08-07 RX ORDER — ONDANSETRON 2 MG/ML
INJECTION INTRAMUSCULAR; INTRAVENOUS PRN
Status: DISCONTINUED | OUTPATIENT
Start: 2025-08-07 | End: 2025-08-07

## 2025-08-07 RX ORDER — LIDOCAINE 40 MG/G
CREAM TOPICAL
Status: DISCONTINUED | OUTPATIENT
Start: 2025-08-07 | End: 2025-08-07 | Stop reason: HOSPADM

## 2025-08-07 RX ORDER — DEXAMETHASONE SODIUM PHOSPHATE 10 MG/ML
4 INJECTION, SOLUTION INTRAMUSCULAR; INTRAVENOUS
Status: DISCONTINUED | OUTPATIENT
Start: 2025-08-07 | End: 2025-08-07 | Stop reason: HOSPADM

## 2025-08-07 RX ORDER — APREPITANT 40 MG/1
40 CAPSULE ORAL ONCE
Status: COMPLETED | OUTPATIENT
Start: 2025-08-07 | End: 2025-08-07

## 2025-08-07 RX ORDER — BALANCED SALT SOLUTION 6.4; .75; .48; .3; 3.9; 1.7 MG/ML; MG/ML; MG/ML; MG/ML; MG/ML; MG/ML
SOLUTION OPHTHALMIC PRN
Status: DISCONTINUED | OUTPATIENT
Start: 2025-08-07 | End: 2025-08-07 | Stop reason: HOSPADM

## 2025-08-07 RX ADMIN — PROPOFOL 110 MG: 10 INJECTION, EMULSION INTRAVENOUS at 09:53

## 2025-08-07 RX ADMIN — Medication 100 MCG: at 10:23

## 2025-08-07 RX ADMIN — Medication 1 DROP: at 09:18

## 2025-08-07 RX ADMIN — DEXAMETHASONE SODIUM PHOSPHATE 4 MG: 4 INJECTION, SOLUTION INTRA-ARTICULAR; INTRALESIONAL; INTRAMUSCULAR; INTRAVENOUS; SOFT TISSUE at 10:05

## 2025-08-07 RX ADMIN — ONDANSETRON 4 MG: 2 INJECTION INTRAMUSCULAR; INTRAVENOUS at 10:05

## 2025-08-07 RX ADMIN — SODIUM CHLORIDE, SODIUM LACTATE, POTASSIUM CHLORIDE, CALCIUM CHLORIDE: 600; 310; 30; 20 INJECTION, SOLUTION INTRAVENOUS at 09:39

## 2025-08-07 RX ADMIN — Medication 1 DROP: at 09:17

## 2025-08-07 RX ADMIN — PROPARACAINE HYDROCHLORIDE 1 DROP: 5 SOLUTION/ DROPS OPHTHALMIC at 08:56

## 2025-08-07 RX ADMIN — APREPITANT 40 MG: 40 CAPSULE ORAL at 09:04

## 2025-08-07 RX ADMIN — Medication 100 MCG: at 10:42

## 2025-08-07 RX ADMIN — PROPOFOL 100 MCG/KG/MIN: 10 INJECTION, EMULSION INTRAVENOUS at 09:57

## 2025-08-07 RX ADMIN — Medication 1 DROP: at 08:58

## 2025-08-07 RX ADMIN — Medication 50 MCG: at 10:11

## 2025-08-07 RX ADMIN — LIDOCAINE HYDROCHLORIDE 100 MG: 20 INJECTION, SOLUTION INFILTRATION; PERINEURAL at 09:53

## 2025-08-07 RX ADMIN — ACETAMINOPHEN 975 MG: 325 TABLET ORAL at 09:05

## 2025-08-07 RX ADMIN — Medication 50 MCG: at 10:01

## 2025-08-07 RX ADMIN — Medication 100 MCG: at 11:22

## 2025-08-14 LAB
BACTERIA TISS BX CULT: NO GROWTH
BACTERIA TISS BX CULT: NORMAL

## 2025-08-18 ENCOUNTER — VIRTUAL VISIT (OUTPATIENT)
Dept: INTERPRETER SERVICES | Facility: CLINIC | Age: OVER 89
End: 2025-08-18

## 2025-08-18 ENCOUNTER — THERAPY VISIT (OUTPATIENT)
Dept: PHYSICAL THERAPY | Facility: REHABILITATION | Age: OVER 89
End: 2025-08-18
Payer: COMMERCIAL

## 2025-08-18 DIAGNOSIS — H81.10 BENIGN PAROXYSMAL POSITIONAL VERTIGO, UNSPECIFIED LATERALITY: Primary | ICD-10-CM

## 2025-08-18 PROCEDURE — 95992 CANALITH REPOSITIONING PROC: CPT | Mod: GP | Performed by: PHYSICAL THERAPIST

## 2025-08-21 LAB — BACTERIA TISS BX CULT: NORMAL

## 2025-08-28 LAB — BACTERIA TISS BX CULT: NORMAL

## 2025-09-04 LAB — BACTERIA TISS BX CULT: NO GROWTH

## (undated) RX ORDER — PROPOFOL 10 MG/ML
INJECTION, EMULSION INTRAVENOUS
Status: DISPENSED
Start: 2025-08-07

## (undated) RX ORDER — APREPITANT 40 MG/1
CAPSULE ORAL
Status: DISPENSED
Start: 2025-08-07

## (undated) RX ORDER — FENTANYL CITRATE-0.9 % NACL/PF 10 MCG/ML
PLASTIC BAG, INJECTION (ML) INTRAVENOUS
Status: DISPENSED
Start: 2025-08-07

## (undated) RX ORDER — ONDANSETRON 2 MG/ML
INJECTION INTRAMUSCULAR; INTRAVENOUS
Status: DISPENSED
Start: 2025-08-07

## (undated) RX ORDER — ACETAMINOPHEN 325 MG/1
TABLET ORAL
Status: DISPENSED
Start: 2025-08-07

## (undated) RX ORDER — DEXAMETHASONE SODIUM PHOSPHATE 4 MG/ML
INJECTION, SOLUTION INTRA-ARTICULAR; INTRALESIONAL; INTRAMUSCULAR; INTRAVENOUS; SOFT TISSUE
Status: DISPENSED
Start: 2025-08-07